# Patient Record
Sex: FEMALE | Race: WHITE | Employment: OTHER | URBAN - METROPOLITAN AREA
[De-identification: names, ages, dates, MRNs, and addresses within clinical notes are randomized per-mention and may not be internally consistent; named-entity substitution may affect disease eponyms.]

---

## 2017-11-30 PROBLEM — L98.422 SKIN ULCER OF BACK WITH FAT LAYER EXPOSED (HCC): Status: ACTIVE | Noted: 2017-11-30

## 2018-03-16 ENCOUNTER — HOSPITAL ENCOUNTER (OUTPATIENT)
Dept: WOUND CARE | Age: 38
Discharge: HOME | End: 2018-03-16
Payer: MEDICARE

## 2018-03-16 VITALS
DIASTOLIC BLOOD PRESSURE: 64 MMHG | RESPIRATION RATE: 16 BRPM | HEIGHT: 55 IN | SYSTOLIC BLOOD PRESSURE: 104 MMHG | TEMPERATURE: 98.3 F | BODY MASS INDEX: 42.35 KG/M2 | WEIGHT: 183 LBS | HEART RATE: 70 BPM

## 2018-03-16 PROCEDURE — 11042 DBRDMT SUBQ TIS 1ST 20SQCM/<: CPT

## 2018-03-16 PROCEDURE — 11045 DBRDMT SUBQ TISS EACH ADDL: CPT | Performed by: FAMILY MEDICINE

## 2018-03-16 PROCEDURE — 11042 DBRDMT SUBQ TIS 1ST 20SQCM/<: CPT | Performed by: FAMILY MEDICINE

## 2018-03-16 NOTE — PROGRESS NOTES
Follow-Up Wound Progress Note  Maryam Casper  AGE: 42 y.o. GENDER: female  DOD: 1980  TODAY'S DATE:  3/16/18  Subjective:    Kirti Dominguez is a 42 y.o. female who presents today for wound check. Wound Etiology :  pressure ulcer: Stage III  Wound Location :   on the left heel and the dorsal spine Pain : {0/10     Abx : Absent       Overall Wound Assessment  Wound is has slightly improved, heel and back is down to periosteum  Size has decreased    Objective:    /64   Pulse 70   Temp 98.3 °F (36.8 °C) (Oral)   Resp 16   Ht 4' 3\" (1.295 m)   Wt 183 lb (83 kg)   BMI 49.47 kg/m²   Pressure Ulcer 10/20/14 Coccyx #12 stage II pressure ulcer coccyx aquired 1-18-14 (Active)   Number of days: 1243       Pressure Ulcer 11/02/17 Foot Left #14 left HEEL stage II pressure ulcer acq: 10-14-17 (Active)   Kiera-wound Assessment Calloused;Pink; Intact 3/16/2018  9:07 AM   Kiera-Wound Moisture Dry,Scaly; Intact 3/1/2018  9:24 AM   Pressure Ulcer Staging Stage II 11/2/2017  8:43 AM   Wound Assessment Red;Pink 3/16/2018  9:07 AM   Wound Length (cm) 1 cm 3/16/2018  9:33 AM   Wound Width (cm) 0.6 cm 3/16/2018  9:33 AM   Wound Depth (cm)  .4 3/16/2018  9:33 AM   Calculated Wound Size (cm^2) (l*w) 0.6 cm^2 3/16/2018  9:33 AM   Change in Wound Size % (l*w) 95.37 3/16/2018  9:33 AM   Culture Taken Yes 11/2/2017  9:11 AM   Dressing Status Intact 3/16/2018 10:27 AM   Dressing Changed Changed/New 3/16/2018 10:27 AM   Dressing/Treatment Dry dressing 3/16/2018 10:27 AM   Wound Cleansed Rinsed/Irrigated with saline 3/9/2018  9:39 AM   Dressing Change Due 03/09/18 3/9/2018  9:39 AM   Granulation Quality Red 2/1/2018  8:41 AM   Drainage Amount Scant 3/16/2018  9:07 AM   Drainage Description Serosanguinous 3/16/2018  9:07 AM   Odor None 3/16/2018  9:07 AM   Debridement per physician Subcutaneous 3/16/2018  9:33 AM   Time out Yes 3/16/2018  9:33 AM   Procedural Pain 0 3/16/2018  9:33 AM   Post procedural Pain 0 2/22/2018  9:34 AM   Number of days: 134       Pressure Ulcer 11/02/17 Coccyx Mid #15 blocked stage II coccyx ulcer acq: 10-22-17 (Active)   Kiera-wound Assessment Excoriated;Fragile 3/16/2018  9:07 AM   Kiera-Wound Moisture Dry,Scaly 3/1/2018  9:24 AM   Pressure Ulcer Staging Stage II 11/2/2017  8:43 AM   Wound Assessment Yellow;Pink;Red 3/16/2018  9:07 AM   Wound Length (cm) 5 cm 3/16/2018  9:33 AM   Wound Width (cm) 7.4 cm 3/16/2018  9:33 AM   Wound Depth (cm)  1.3 3/16/2018  9:33 AM   Calculated Wound Size (cm^2) (l*w) 37 cm^2 3/16/2018  9:33 AM   Change in Wound Size % (l*w) 39.24 3/16/2018  9:33 AM   Culture Taken Yes 11/9/2017 10:02 AM   Dressing Status Intact 3/16/2018 10:27 AM   Dressing Changed Changed/New 3/16/2018 10:27 AM   Dressing/Treatment Alginate;Dry dressing 3/16/2018 10:27 AM   Wound Cleansed Rinsed/Irrigated with saline 3/16/2018 10:27 AM   Dressing Change Due 03/09/18 3/9/2018  9:39 AM   Drainage Amount Moderate 3/16/2018  9:07 AM   Drainage Description Serosanguinous 3/16/2018  9:07 AM   Odor None 3/16/2018  9:07 AM   Undermining Starts ___ O'Clock 1 3/16/2018  9:07 AM   Undermining Ends___ O'Clock 4 3/16/2018  9:07 AM   Undermining Maxium Distance (cm) 1.5 3/16/2018  9:07 AM   [place]%Wound Bed 20 1/11/2018  8:26 AM   Red%Wound Bed 40 1/11/2018  8:26 AM   Yellow%Wound Bed 40 1/11/2018  8:26 AM   Debridement per physician Subcutaneous 3/16/2018  9:33 AM   Time out Yes 3/16/2018  9:33 AM   Procedural Pain 0 3/16/2018  9:33 AM   Post procedural Pain 0 2/22/2018  9:34 AM   Number of days: 134       Wound 03/16/18 Toe (Comment  which one) Left #16 new aquired 3/1/18 first toe (Active)   Wound Pressure Stage  2 3/16/2018  9:07 AM   Dressing Changed Changed/New 3/16/2018 10:27 AM   Dressing/Treatment Dry dressing 3/16/2018 10:27 AM   Wound Cleansed Rinsed/Irrigated with saline 3/16/2018 10:27 AM   Wound Length (cm) 1 cm 3/16/2018  9:07 AM   Wound Width (cm) 0.7 cm 3/16/2018  9:07 AM   Wound Depth (cm)  .1 3/16/2018 9: 07 AM   Calculated Wound Size (cm^2) (l*w) 0.7 cm^2 3/16/2018  9:07 AM   Wound Assessment Red;Bleeding 3/16/2018  9:07 AM   Drainage Amount Small 3/16/2018  9:07 AM   Drainage Description Sanguinous 3/16/2018  9:07 AM   Odor None 3/16/2018  9:07 AM   Kiera-wound Assessment Pink 3/16/2018  9:07 AM   Debridement per physician None 3/16/2018  9:33 AM   Number of days: 0       Incision 10/06/14 Back Mid (Active)   Number of days: 1257     Wound   serous exudate noted  Errythema - Present    (this wound was originally measured on:)  Wound 03/16/18 Toe (Comment  which one) Left #16 new aquired 3/1/18 first toe-Wound Length (cm): 1 cm  Wound 03/16/18 Toe (Comment  which one) Left #16 new aquired 3/1/18 first toe-Wound Width (cm): 0.7 cm  Wound 03/16/18 Toe (Comment  which one) Left #16 new aquired 3/1/18 first toe-Wound Depth (cm) : .1   Measurements shown are from today's visit. Wound 03/16/18 Toe (Comment  which one) Left #16 new aquired 3/1/18 first toe-Wound Assessment: Red, Bleeding     Wound 03/16/18 Toe (Comment  which one) Left #16 new aquired 3/1/18 first toe-Wound Assessment: Red, Bleeding  Wound 03/16/18 Toe (Comment  which one) Left #16 new aquired 3/1/18 first toe-Kiera-wound Assessment: Pink  Wound 03/16/18 Toe (Comment  which one) Left #16 new aquired 3/1/18 first toe-Drainage Amount: Small  Wound 03/16/18 Toe (Comment  which one) Left #16 new aquired 3/1/18 first toe-Drainage Description: Sanguinous  Wound 03/16/18 Toe (Comment  which one) Left #16 new aquired 3/1/18 first toe-Odor: None     Assessment:     Please refer to nursing measurements and assessment regarding wound size pre and post debridement. Wound check - Care provided includes removal of existing dressing and visual inspection    Procedure: Debridement Note  The patient was placed in the right lateral position. Lidocaine  gauze was applied  at beginning of wound evaluation. An  Excisional Debridement was performed. Using a curette ,  the wound was debrided sharply of all fibrotic, necrotic, and hyperkeratotic tissue, including a layer of surrounding healthy tissue to stimulate epithelization. The wound was excised through the level of the subcutaneous Wound Percentage debrided is 100%. Please refer to Nurses notes for pre and post debridement dimensions. Wound was irrigated with normal saline solution. Bleeding was with a small amount of bleeding, and controlled with pressure. Patient tolerated procedure well and was given proper instruction. Diagnosis: pressure ulcer: Stage III                      Patient Active Problem List   Diagnosis Code    Decubitus skin ulcer. 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 Initiate VAC Tx on the back wound. 3. Discussed appropriate home care of this wound. 4. Patient instructions were given. 5. Follow Up in 1 week(s).                                      Shanique Chapman DO                           3/16/18     11:31 AM

## 2018-03-23 ENCOUNTER — HOSPITAL ENCOUNTER (OUTPATIENT)
Dept: WOUND CARE | Age: 38
Discharge: HOME | End: 2018-03-23
Payer: MEDICARE

## 2018-03-23 VITALS
WEIGHT: 183 LBS | HEART RATE: 72 BPM | HEIGHT: 55 IN | DIASTOLIC BLOOD PRESSURE: 54 MMHG | BODY MASS INDEX: 42.35 KG/M2 | TEMPERATURE: 98.7 F | SYSTOLIC BLOOD PRESSURE: 104 MMHG | RESPIRATION RATE: 18 BRPM

## 2018-03-23 DIAGNOSIS — L89.154 DECUBITUS ULCER OF SACRAL REGION, STAGE 4 (HCC): Primary | ICD-10-CM

## 2018-03-23 PROCEDURE — 11045 DBRDMT SUBQ TISS EACH ADDL: CPT | Performed by: FAMILY MEDICINE

## 2018-03-23 PROCEDURE — 97607 NEG PRS WND THR NDME<=50SQCM: CPT

## 2018-03-23 PROCEDURE — 11042 DBRDMT SUBQ TIS 1ST 20SQCM/<: CPT

## 2018-03-23 PROCEDURE — 11045 DBRDMT SUBQ TISS EACH ADDL: CPT

## 2018-03-23 PROCEDURE — 11042 DBRDMT SUBQ TIS 1ST 20SQCM/<: CPT | Performed by: FAMILY MEDICINE

## 2018-04-06 ENCOUNTER — HOSPITAL ENCOUNTER (OUTPATIENT)
Dept: WOUND CARE | Age: 38
Discharge: HOME | End: 2018-04-06
Payer: MEDICARE

## 2018-04-06 ENCOUNTER — HOSPITAL ENCOUNTER (OUTPATIENT)
Age: 38
Discharge: HOME | End: 2018-04-06
Payer: MEDICARE

## 2018-04-06 VITALS
HEART RATE: 99 BPM | SYSTOLIC BLOOD PRESSURE: 104 MMHG | TEMPERATURE: 98.6 F | RESPIRATION RATE: 16 BRPM | DIASTOLIC BLOOD PRESSURE: 62 MMHG

## 2018-04-06 LAB
ANION GAP SERPL CALCULATED.3IONS-SCNC: 12 MMOL/L (ref 7–16)
BUN BLDV-MCNC: 8 MG/DL (ref 6–20)
CALCIUM SERPL-MCNC: 8.9 MG/DL (ref 8.6–10.2)
CHLORIDE BLD-SCNC: 100 MMOL/L (ref 98–107)
CHOLESTEROL, FASTING: 169 MG/DL (ref 0–199)
CO2: 25 MMOL/L (ref 22–29)
CREAT SERPL-MCNC: 0.4 MG/DL (ref 0.5–1)
CREATININE URINE: 39 MG/DL (ref 29–226)
GFR AFRICAN AMERICAN: >60
GFR NON-AFRICAN AMERICAN: >60 ML/MIN/1.73
GLUCOSE FASTING: 97 MG/DL (ref 74–109)
HDLC SERPL-MCNC: 71 MG/DL
LDL CHOLESTEROL CALCULATED: 86 MG/DL (ref 0–99)
MAGNESIUM: 2.1 MG/DL (ref 1.6–2.6)
MICROALBUMIN UR-MCNC: 15.4 MG/L
MICROALBUMIN/CREAT UR-RTO: 39.5 (ref 0–30)
POTASSIUM SERPL-SCNC: 4.3 MMOL/L (ref 3.5–5)
SODIUM BLD-SCNC: 137 MMOL/L (ref 132–146)
TRIGLYCERIDE, FASTING: 60 MG/DL (ref 0–149)
VITAMIN D 25-HYDROXY: 26 NG/ML (ref 30–100)
VLDLC SERPL CALC-MCNC: 12 MG/DL

## 2018-04-06 PROCEDURE — 83735 ASSAY OF MAGNESIUM: CPT

## 2018-04-06 PROCEDURE — 82570 ASSAY OF URINE CREATININE: CPT

## 2018-04-06 PROCEDURE — 11042 DBRDMT SUBQ TIS 1ST 20SQCM/<: CPT

## 2018-04-06 PROCEDURE — 80061 LIPID PANEL: CPT

## 2018-04-06 PROCEDURE — 36415 COLL VENOUS BLD VENIPUNCTURE: CPT

## 2018-04-06 PROCEDURE — 82306 VITAMIN D 25 HYDROXY: CPT

## 2018-04-06 PROCEDURE — 11042 DBRDMT SUBQ TIS 1ST 20SQCM/<: CPT | Performed by: FAMILY MEDICINE

## 2018-04-06 PROCEDURE — 11045 DBRDMT SUBQ TISS EACH ADDL: CPT | Performed by: FAMILY MEDICINE

## 2018-04-06 PROCEDURE — 11045 DBRDMT SUBQ TISS EACH ADDL: CPT

## 2018-04-06 PROCEDURE — 80048 BASIC METABOLIC PNL TOTAL CA: CPT

## 2018-04-06 PROCEDURE — 82044 UR ALBUMIN SEMIQUANTITATIVE: CPT

## 2018-04-06 PROCEDURE — 97607 NEG PRS WND THR NDME<=50SQCM: CPT

## 2018-04-13 ENCOUNTER — HOSPITAL ENCOUNTER (OUTPATIENT)
Dept: WOUND CARE | Age: 38
Discharge: HOME | End: 2018-04-13
Payer: MEDICARE

## 2018-04-13 VITALS
HEART RATE: 80 BPM | WEIGHT: 183 LBS | RESPIRATION RATE: 18 BRPM | DIASTOLIC BLOOD PRESSURE: 60 MMHG | BODY MASS INDEX: 42.35 KG/M2 | HEIGHT: 55 IN | TEMPERATURE: 99.3 F | SYSTOLIC BLOOD PRESSURE: 118 MMHG

## 2018-04-13 DIAGNOSIS — L97.422 SKIN ULCER OF LEFT HEEL WITH FAT LAYER EXPOSED (HCC): Primary | ICD-10-CM

## 2018-04-13 DIAGNOSIS — L98.422 SKIN ULCER OF BACK WITH FAT LAYER EXPOSED (HCC): ICD-10-CM

## 2018-04-13 PROCEDURE — 11042 DBRDMT SUBQ TIS 1ST 20SQCM/<: CPT

## 2018-04-13 PROCEDURE — 11042 DBRDMT SUBQ TIS 1ST 20SQCM/<: CPT | Performed by: FAMILY MEDICINE

## 2018-04-13 PROCEDURE — 97607 NEG PRS WND THR NDME<=50SQCM: CPT

## 2018-04-13 PROCEDURE — 11045 DBRDMT SUBQ TISS EACH ADDL: CPT | Performed by: FAMILY MEDICINE

## 2018-04-20 ENCOUNTER — HOSPITAL ENCOUNTER (OUTPATIENT)
Dept: WOUND CARE | Age: 38
Discharge: HOME | End: 2018-04-20
Payer: MEDICARE

## 2018-04-20 VITALS
WEIGHT: 183 LBS | TEMPERATURE: 98.6 F | HEIGHT: 55 IN | DIASTOLIC BLOOD PRESSURE: 54 MMHG | HEART RATE: 88 BPM | RESPIRATION RATE: 18 BRPM | SYSTOLIC BLOOD PRESSURE: 102 MMHG | BODY MASS INDEX: 42.35 KG/M2

## 2018-04-20 DIAGNOSIS — L98.422 SKIN ULCER OF BACK WITH FAT LAYER EXPOSED (HCC): ICD-10-CM

## 2018-04-20 DIAGNOSIS — L97.422 SKIN ULCER OF LEFT HEEL WITH FAT LAYER EXPOSED (HCC): ICD-10-CM

## 2018-04-20 PROCEDURE — 11042 DBRDMT SUBQ TIS 1ST 20SQCM/<: CPT | Performed by: FAMILY MEDICINE

## 2018-04-20 PROCEDURE — 11042 DBRDMT SUBQ TIS 1ST 20SQCM/<: CPT

## 2018-04-20 PROCEDURE — 97607 NEG PRS WND THR NDME<=50SQCM: CPT

## 2018-05-04 ENCOUNTER — HOSPITAL ENCOUNTER (OUTPATIENT)
Dept: WOUND CARE | Age: 38
Discharge: HOME | End: 2018-05-04

## 2018-05-18 ENCOUNTER — HOSPITAL ENCOUNTER (OUTPATIENT)
Dept: WOUND CARE | Age: 38
Discharge: HOME | End: 2018-05-18
Payer: MEDICARE

## 2018-05-18 VITALS
RESPIRATION RATE: 18 BRPM | TEMPERATURE: 98.6 F | DIASTOLIC BLOOD PRESSURE: 60 MMHG | SYSTOLIC BLOOD PRESSURE: 110 MMHG | HEART RATE: 84 BPM

## 2018-05-18 DIAGNOSIS — L98.422 SKIN ULCER OF BACK WITH FAT LAYER EXPOSED (HCC): ICD-10-CM

## 2018-05-18 DIAGNOSIS — L97.422 SKIN ULCER OF LEFT HEEL WITH FAT LAYER EXPOSED (HCC): ICD-10-CM

## 2018-05-18 PROCEDURE — 11042 DBRDMT SUBQ TIS 1ST 20SQCM/<: CPT | Performed by: FAMILY MEDICINE

## 2018-05-18 PROCEDURE — 87186 SC STD MICRODIL/AGAR DIL: CPT

## 2018-05-18 PROCEDURE — 11042 DBRDMT SUBQ TIS 1ST 20SQCM/<: CPT

## 2018-05-18 PROCEDURE — 87075 CULTR BACTERIA EXCEPT BLOOD: CPT

## 2018-05-18 PROCEDURE — 11045 DBRDMT SUBQ TISS EACH ADDL: CPT | Performed by: FAMILY MEDICINE

## 2018-05-18 PROCEDURE — 87070 CULTURE OTHR SPECIMN AEROBIC: CPT

## 2018-05-18 PROCEDURE — 11045 DBRDMT SUBQ TISS EACH ADDL: CPT

## 2018-05-18 PROCEDURE — 87077 CULTURE AEROBIC IDENTIFY: CPT

## 2018-05-20 LAB — ANAEROBIC CULTURE: NORMAL

## 2018-05-21 LAB
ORGANISM: ABNORMAL
WOUND/ABSCESS: ABNORMAL

## 2018-05-25 ENCOUNTER — HOSPITAL ENCOUNTER (OUTPATIENT)
Dept: WOUND CARE | Age: 38
Discharge: HOME | End: 2018-05-25
Payer: MEDICARE

## 2018-05-25 VITALS
HEART RATE: 78 BPM | BODY MASS INDEX: 42.35 KG/M2 | RESPIRATION RATE: 20 BRPM | TEMPERATURE: 99.1 F | DIASTOLIC BLOOD PRESSURE: 58 MMHG | SYSTOLIC BLOOD PRESSURE: 118 MMHG | HEIGHT: 55 IN | WEIGHT: 183 LBS

## 2018-05-25 PROCEDURE — 11042 DBRDMT SUBQ TIS 1ST 20SQCM/<: CPT

## 2018-05-25 PROCEDURE — 11042 DBRDMT SUBQ TIS 1ST 20SQCM/<: CPT | Performed by: FAMILY MEDICINE

## 2018-05-25 PROCEDURE — 97607 NEG PRS WND THR NDME<=50SQCM: CPT

## 2018-06-01 ENCOUNTER — HOSPITAL ENCOUNTER (OUTPATIENT)
Dept: WOUND CARE | Age: 38
Discharge: HOME | End: 2018-06-01
Payer: MEDICARE

## 2018-06-01 VITALS
BODY MASS INDEX: 42.35 KG/M2 | TEMPERATURE: 98.9 F | WEIGHT: 183 LBS | HEIGHT: 55 IN | RESPIRATION RATE: 18 BRPM | DIASTOLIC BLOOD PRESSURE: 60 MMHG | SYSTOLIC BLOOD PRESSURE: 100 MMHG | HEART RATE: 80 BPM

## 2018-06-01 PROCEDURE — 11042 DBRDMT SUBQ TIS 1ST 20SQCM/<: CPT

## 2018-06-01 PROCEDURE — 11042 DBRDMT SUBQ TIS 1ST 20SQCM/<: CPT | Performed by: FAMILY MEDICINE

## 2018-06-01 PROCEDURE — 97607 NEG PRS WND THR NDME<=50SQCM: CPT

## 2018-06-08 ENCOUNTER — HOSPITAL ENCOUNTER (OUTPATIENT)
Dept: WOUND CARE | Age: 38
Discharge: HOME | End: 2018-06-08
Payer: MEDICARE

## 2018-06-08 VITALS
RESPIRATION RATE: 16 BRPM | HEART RATE: 73 BPM | TEMPERATURE: 98.3 F | SYSTOLIC BLOOD PRESSURE: 90 MMHG | DIASTOLIC BLOOD PRESSURE: 40 MMHG

## 2018-06-08 DIAGNOSIS — L98.422 SKIN ULCER OF BACK WITH FAT LAYER EXPOSED (HCC): ICD-10-CM

## 2018-06-08 DIAGNOSIS — L97.422 SKIN ULCER OF LEFT HEEL WITH FAT LAYER EXPOSED (HCC): ICD-10-CM

## 2018-06-08 PROCEDURE — 11042 DBRDMT SUBQ TIS 1ST 20SQCM/<: CPT

## 2018-06-08 PROCEDURE — 11042 DBRDMT SUBQ TIS 1ST 20SQCM/<: CPT | Performed by: FAMILY MEDICINE

## 2018-06-08 PROCEDURE — 97607 NEG PRS WND THR NDME<=50SQCM: CPT

## 2018-06-15 ENCOUNTER — HOSPITAL ENCOUNTER (OUTPATIENT)
Dept: WOUND CARE | Age: 38
Discharge: HOME | End: 2018-06-15
Payer: MEDICARE

## 2018-06-15 VITALS
BODY MASS INDEX: 42.35 KG/M2 | HEIGHT: 55 IN | DIASTOLIC BLOOD PRESSURE: 60 MMHG | HEART RATE: 76 BPM | RESPIRATION RATE: 16 BRPM | TEMPERATURE: 98.1 F | WEIGHT: 183 LBS | SYSTOLIC BLOOD PRESSURE: 110 MMHG

## 2018-06-15 PROCEDURE — 97607 NEG PRS WND THR NDME<=50SQCM: CPT

## 2018-06-15 PROCEDURE — 11042 DBRDMT SUBQ TIS 1ST 20SQCM/<: CPT | Performed by: FAMILY MEDICINE

## 2018-06-15 PROCEDURE — 11042 DBRDMT SUBQ TIS 1ST 20SQCM/<: CPT

## 2018-06-22 ENCOUNTER — HOSPITAL ENCOUNTER (OUTPATIENT)
Dept: WOUND CARE | Age: 38
Discharge: HOME | End: 2018-06-22
Payer: MEDICARE

## 2018-06-22 VITALS
SYSTOLIC BLOOD PRESSURE: 112 MMHG | DIASTOLIC BLOOD PRESSURE: 62 MMHG | HEART RATE: 70 BPM | TEMPERATURE: 98 F | RESPIRATION RATE: 14 BRPM

## 2018-06-22 DIAGNOSIS — L98.422 SKIN ULCER OF BACK WITH FAT LAYER EXPOSED (HCC): ICD-10-CM

## 2018-06-22 DIAGNOSIS — L97.422 SKIN ULCER OF LEFT HEEL WITH FAT LAYER EXPOSED (HCC): ICD-10-CM

## 2018-06-22 PROCEDURE — 11042 DBRDMT SUBQ TIS 1ST 20SQCM/<: CPT | Performed by: FAMILY MEDICINE

## 2018-06-22 PROCEDURE — 11042 DBRDMT SUBQ TIS 1ST 20SQCM/<: CPT

## 2018-06-29 ENCOUNTER — HOSPITAL ENCOUNTER (OUTPATIENT)
Dept: WOUND CARE | Age: 38
Discharge: HOME | End: 2018-06-29
Payer: MEDICARE

## 2018-06-29 VITALS
WEIGHT: 183 LBS | HEIGHT: 55 IN | TEMPERATURE: 98.5 F | RESPIRATION RATE: 18 BRPM | HEART RATE: 68 BPM | DIASTOLIC BLOOD PRESSURE: 66 MMHG | BODY MASS INDEX: 42.35 KG/M2 | SYSTOLIC BLOOD PRESSURE: 108 MMHG

## 2018-06-29 DIAGNOSIS — L98.422 SKIN ULCER OF BACK WITH FAT LAYER EXPOSED (HCC): ICD-10-CM

## 2018-06-29 PROCEDURE — 11042 DBRDMT SUBQ TIS 1ST 20SQCM/<: CPT

## 2018-06-29 PROCEDURE — 11042 DBRDMT SUBQ TIS 1ST 20SQCM/<: CPT | Performed by: FAMILY MEDICINE

## 2018-07-06 ENCOUNTER — HOSPITAL ENCOUNTER (OUTPATIENT)
Dept: WOUND CARE | Age: 38
Discharge: HOME | End: 2018-07-06
Payer: MEDICARE

## 2018-07-06 VITALS
TEMPERATURE: 99 F | DIASTOLIC BLOOD PRESSURE: 50 MMHG | HEART RATE: 76 BPM | RESPIRATION RATE: 18 BRPM | WEIGHT: 183 LBS | BODY MASS INDEX: 42.35 KG/M2 | SYSTOLIC BLOOD PRESSURE: 98 MMHG | HEIGHT: 55 IN

## 2018-07-06 DIAGNOSIS — L98.422 SKIN ULCER OF BACK WITH FAT LAYER EXPOSED (HCC): ICD-10-CM

## 2018-07-06 PROCEDURE — 11042 DBRDMT SUBQ TIS 1ST 20SQCM/<: CPT | Performed by: FAMILY MEDICINE

## 2018-07-06 PROCEDURE — 97607 NEG PRS WND THR NDME<=50SQCM: CPT

## 2018-07-06 PROCEDURE — 11042 DBRDMT SUBQ TIS 1ST 20SQCM/<: CPT

## 2018-07-13 ENCOUNTER — HOSPITAL ENCOUNTER (OUTPATIENT)
Dept: WOUND CARE | Age: 38
Discharge: HOME | End: 2018-07-13
Payer: MEDICARE

## 2018-07-13 VITALS
TEMPERATURE: 98.7 F | HEIGHT: 55 IN | DIASTOLIC BLOOD PRESSURE: 72 MMHG | SYSTOLIC BLOOD PRESSURE: 104 MMHG | WEIGHT: 183 LBS | RESPIRATION RATE: 18 BRPM | HEART RATE: 76 BPM | BODY MASS INDEX: 42.35 KG/M2

## 2018-07-13 PROCEDURE — 11042 DBRDMT SUBQ TIS 1ST 20SQCM/<: CPT | Performed by: FAMILY MEDICINE

## 2018-07-13 PROCEDURE — 11042 DBRDMT SUBQ TIS 1ST 20SQCM/<: CPT

## 2018-07-13 NOTE — PLAN OF CARE
Problem: Pressure Ulcer:  Goal: Signs of wound healing will improve  Signs of wound healing will improve   Outcome: Ongoing

## 2018-07-13 NOTE — PROGRESS NOTES
Undermining Starts ___ O'Clock 3 7/13/2018  8:28 AM   Undermining Ends___ O'Clock 9 7/13/2018  8:28 AM   Undermining Maxium Distance (cm) 1.3 7/13/2018  8:28 AM   [place]%Wound Bed 20 1/11/2018  8:26 AM   Red%Wound Bed 40 1/11/2018  8:26 AM   Yellow%Wound Bed 40 1/11/2018  8:26 AM   Debridement per physician Subcutaneous 3/16/2018  9:33 AM   Time out Yes 7/13/2018  9:45 AM   Procedural Pain 0 7/13/2018  9:45 AM   Post procedural Pain 0 6/15/2018  9:53 AM   Number of days: 253       Incision 10/06/14 Back Mid (Active)   Number of days: 1376     Wound   serous exudate noted  Errythema - Present    (this wound was originally measured on:)            Measurements shown are from today's visit. Assessment:     Please refer to nursing measurements and assessment regarding wound size pre and post debridement. Wound check - Care provided includes removal of existing dressing and visual inspection    Procedure: Debridement Note: Wound # 15. At 8.25 cm sq. The patient was placed in the right lateral position. Lidocaine  gauze was applied  at beginning of wound evaluation. An  Excisional Debridement was performed. Using a curette and tissue nippers ,  the wound was debrided sharply of all fibrotic, necrotic, and hyperkeratotic tissue, including a layer of surrounding healthy tissue to stimulate epithelization. The wound was excised through the level of the subcutaneous Wound Percentage debrided is 100%. Please refer to Nurses notes for pre and post debridement dimensions. Wound was irrigated with normal saline solution. Bleeding was with a moderate amount of bleeding, and controlled with pressure. Patient tolerated procedure well and was given proper instruction. Diagnosis: pressure ulcer: Stage IV                      Patient Active Problem List   Diagnosis Code    Decubitus skin ulcer.  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 DC VAC and apply Alginate daily                              3. Discussed appropriate home care of this wound. 4. Patient instructions were given. 5. Follow Up in 1 week(s).                                      Fish Arriaga DO                           7/13/18     12:13 PM

## 2018-07-20 ENCOUNTER — HOSPITAL ENCOUNTER (OUTPATIENT)
Dept: WOUND CARE | Age: 38
Discharge: HOME | End: 2018-07-20
Payer: MEDICARE

## 2018-07-20 VITALS
RESPIRATION RATE: 14 BRPM | SYSTOLIC BLOOD PRESSURE: 100 MMHG | TEMPERATURE: 99.7 F | DIASTOLIC BLOOD PRESSURE: 70 MMHG | HEART RATE: 70 BPM

## 2018-07-20 PROCEDURE — 11042 DBRDMT SUBQ TIS 1ST 20SQCM/<: CPT

## 2018-07-20 PROCEDURE — 11042 DBRDMT SUBQ TIS 1ST 20SQCM/<: CPT | Performed by: FAMILY MEDICINE

## 2018-07-20 NOTE — PROGRESS NOTES
Bactrim [Sulfamethoxazole-Trimethoprim] Nausea And Vomiting     Current Outpatient Prescriptions on File Prior to Encounter   Medication Sig Dispense Refill    collagenase (SANTYL) 250 UNIT/GM ointment Apply topically daily. 90 g 1    naproxen (EC NAPROSYN) 500 MG EC tablet Take 500 mg by mouth as needed for Pain      enalapril (VASOTEC) 5 MG tablet Take 2.5 mg by mouth daily       albuterol sulfate HFA (PROAIR HFA) 108 (90 Base) MCG/ACT inhaler Inhale 2 puffs into the lungs every 6 hours as needed for Wheezing or Shortness of Breath      simvastatin (ZOCOR) 10 MG tablet Take 10 mg by mouth nightly      Cholecalciferol (VITAMIN D3) 2000 units CAPS Take 1 capsule by mouth nightly       venlafaxine (EFFEXOR) 75 MG tablet Take 75 mg by mouth nightly      ibuprofen (ADVIL) 200 MG CAPS Take 2 capsules by mouth daily as needed for Pain       acetaminophen (TYLENOL) 325 MG tablet Take 650 mg by mouth 3 times daily as needed for Pain       citalopram (CELEXA) 40 MG tablet Take 40 mg by mouth nightly        No current facility-administered medications on file prior to encounter. REVIEW OF SYSTEMS See HPI    Objective:    /70   Pulse 70   Temp 99.7 °F (37.6 °C) (Oral)   Resp 14   Wt Readings from Last 3 Encounters:   07/13/18 183 lb (83 kg)   07/06/18 183 lb (83 kg)   06/29/18 183 lb (83 kg)     PHYSICAL EXAM  CONSTITUTIONAL:   Awake, alert, cooperative   EYES:  lids and lashes normal   ENT: external ears and nose without lesions   NECK:  supple, symmetrical, trachea midline   SKIN:  Open wound Present    Assessment:     Problem List Items Addressed This Visit     None        Procedure Note  Indications:  Based on my examination of this patient's wound(s)/ulcer(s) today, debridement is required to promote healing and evaluate the wound base.     Performed by: Fady Balderrama DO    Consent obtained:  Yes    Time out taken:  Yes    Pain Control: Anesthetic  Anesthetic: None Debridement:Excisional Debridement    Using curette, scissors and forceps the wound(s)/ulcer(s) was/were sharply debrided down through and including the removal of subcutaneous tissue. Devitalized Tissue Debrided:  fibrin, slough and necrotic/eschar to stimulate bleeding to promote healing, post debridement good bleeding base and wound edges noted    Pre Debridement Measurements:  Are located in the [place]  Documentation Flow Sheet    Wound/Ulcer #: 12    Post Debridement Measurements:  Wound/Ulcer Descriptions are Pre Debridement except measurements:    Pressure Ulcer 10/20/14 Coccyx #12 stage II pressure ulcer coccyx aquired 1-18-14 (Active)   Number of days: 1369       Pressure Ulcer 11/02/17 Coccyx Mid #15 blocked stage II coccyx ulcer acq: 10-22-17 (Active)   Kiera-wound Assessment Pink; Intact 7/6/2018  8:27 AM   Kiera-Wound Moisture Macerated 6/29/2018  8:40 AM   Pressure Ulcer Staging Stage II 11/2/2017  8:43 AM   Wound Assessment Pink;Red 7/6/2018  8:27 AM   Wound Length (cm) 5.5 cm 7/20/2018 10:07 AM   Wound Width (cm) 1.8 cm 7/20/2018 10:07 AM   Wound Depth (cm)  2.0 7/20/2018 10:07 AM   Calculated Wound Size (cm^2) (l*w) 9.9 cm^2 7/20/2018 10:07 AM   Change in Wound Size % (l*w) 83.74 7/20/2018 10:07 AM   Culture Taken Yes 5/18/2018  9:05 AM   Dressing Status Changed 6/29/2018  9:49 AM   Dressing Changed Changed/New 6/29/2018  9:49 AM   Dressing/Treatment Alginate 6/29/2018  9:49 AM   Wound Cleansed Rinsed/Irrigated with saline 6/29/2018  9:49 AM   Dressing Change Due 06/23/18 6/22/2018  9:41 AM   Drainage Amount Moderate 7/20/2018  9:25 AM   Drainage Description Serosanguinous 7/20/2018  9:25 AM   Odor None 7/20/2018  9:25 AM   Distance Tunneling (cm) 1.6 cm 6/1/2018  8:57 AM   Tunneling Position ___ O'Clock 0.66 6/1/2018  8:57 AM   Undermining Starts ___ O'Clock 3 7/20/2018  9:25 AM   Undermining Ends___ O'Clock 6 7/20/2018  9:25 AM   Undermining Maxium Distance (cm) 1.5 7/20/2018  9:25 AM

## 2018-08-03 ENCOUNTER — HOSPITAL ENCOUNTER (OUTPATIENT)
Dept: WOUND CARE | Age: 38
Discharge: HOME | End: 2018-08-03
Payer: MEDICARE

## 2018-08-03 VITALS
DIASTOLIC BLOOD PRESSURE: 60 MMHG | RESPIRATION RATE: 16 BRPM | HEART RATE: 78 BPM | SYSTOLIC BLOOD PRESSURE: 114 MMHG | TEMPERATURE: 98.3 F

## 2018-08-03 DIAGNOSIS — L89.154 DECUBITUS ULCER OF SACRAL REGION, STAGE 4 (HCC): ICD-10-CM

## 2018-08-03 DIAGNOSIS — M46.28 ACUTE OSTEOMYELITIS OF SACRUM (HCC): Primary | ICD-10-CM

## 2018-08-03 DIAGNOSIS — L98.422 SKIN ULCER OF BACK WITH FAT LAYER EXPOSED (HCC): ICD-10-CM

## 2018-08-03 PROCEDURE — 87075 CULTR BACTERIA EXCEPT BLOOD: CPT

## 2018-08-03 PROCEDURE — 11042 DBRDMT SUBQ TIS 1ST 20SQCM/<: CPT

## 2018-08-03 PROCEDURE — 87070 CULTURE OTHR SPECIMN AEROBIC: CPT

## 2018-08-03 PROCEDURE — 11042 DBRDMT SUBQ TIS 1ST 20SQCM/<: CPT | Performed by: FAMILY MEDICINE

## 2018-08-03 PROCEDURE — 87186 SC STD MICRODIL/AGAR DIL: CPT

## 2018-08-03 RX ORDER — SULFAMETHOXAZOLE AND TRIMETHOPRIM 800; 160 MG/1; MG/1
1 TABLET ORAL 2 TIMES DAILY
Qty: 14 TABLET | Refills: 0 | Status: SHIPPED | OUTPATIENT
Start: 2018-08-03 | End: 2018-08-10 | Stop reason: ALTCHOICE

## 2018-08-03 NOTE — PLAN OF CARE
Problem: Pressure Ulcer:  Goal: Will show no infection signs and symptoms  Will show no infection signs and symptoms   Outcome: Ongoing

## 2018-08-03 NOTE — PLAN OF CARE
Problem: Pressure Ulcer:  Goal: Absence of new pressure ulcer  Absence of new pressure ulcer   Outcome: Ongoing

## 2018-08-03 NOTE — PROGRESS NOTES
Follow-Up Wound Progress Note  Maryam Casper  AGE: 34 y.o. GENDER: female  DOD: 1980  TODAY'S DATE:  8/3/18  Subjective:    Marilu Sullivan is a 34 y.o. female who presents today for wound check. Wound Etiology :  pressure ulcer: Stage IV  Wound Location :   midline sacrum with bone exposed Pain : {0/10     Abx : Present Written today     Overall Wound Assessment  Wound is has worsened slightly. Size has unchanged    Objective:    /60   Pulse 78   Temp 98.3 °F (36.8 °C) (Oral)   Resp 16   Pressure Ulcer 10/20/14 Coccyx #12 stage II pressure ulcer coccyx aquired 1-18-14 (Active)   Number of days: 1383       Pressure Ulcer 11/02/17 Coccyx Mid #15 blocked stage II coccyx ulcer acq: 10-22-17 (Active)   Kiera-wound Assessment Pink; Intact 7/6/2018  8:27 AM   Kiera-Wound Moisture Macerated 6/29/2018  8:40 AM   Pressure Ulcer Staging Stage II 11/2/2017  8:43 AM   Wound Assessment Pink;Red 7/6/2018  8:27 AM   Wound Length (cm) 6.9 cm 8/3/2018  9:47 AM   Wound Width (cm) 2.6 cm 8/3/2018  9:47 AM   Wound Depth (cm)  4.4 8/3/2018  9:47 AM   Calculated Wound Size (cm^2) (l*w) 17.94 cm^2 8/3/2018  9:47 AM   Change in Wound Size % (l*w) 70.54 8/3/2018  9:47 AM   Culture Taken Yes 8/3/2018  9:47 AM   Dressing Status Changed 8/3/2018 10:16 AM   Dressing Changed Changed/New 8/3/2018 10:16 AM   Dressing/Treatment ABD; Silver dressing;Dry dressing 8/3/2018 10:16 AM   Wound Cleansed Rinsed/Irrigated with saline; Wound cleanser 8/3/2018 10:16 AM   Dressing Change Due 06/23/18 6/22/2018  9:41 AM   Granulation Quality Red 8/3/2018  8:53 AM   Drainage Amount Large 8/3/2018  8:53 AM   Drainage Description Serosanguinous 8/3/2018  8:53 AM   Odor Mild 8/3/2018  8:53 AM   Distance Tunneling (cm) 1.6 cm 6/1/2018  8:57 AM   Tunneling Position ___ O'Clock 0.66 6/1/2018  8:57 AM   Undermining Starts ___ O'Clock 3 7/20/2018  9:25 AM   Undermining Ends___ O'Clock 6 7/20/2018  9:25 AM   Undermining Maxium Distance (cm) 1.5 7/20/2018  9:25 AM   [place]%Wound Bed 20 1/11/2018  8:26 AM   Red%Wound Bed 70 8/3/2018  8:53 AM   Yellow%Wound Bed 30 8/3/2018  8:53 AM   Debridement per physician Subcutaneous 3/16/2018  9:33 AM   Time out Yes 8/3/2018  9:47 AM   Procedural Pain 0 8/3/2018  9:47 AM   Post procedural Pain 0 6/15/2018  9:53 AM   Number of days: 274       Incision 10/06/14 Back Mid (Active)   Number of days: 7536     Wound   serous exudate noted  Errythema - Present    (this wound was originally measured on:)            Measurements shown are from today's visit. Assessment:     Please refer to nursing measurements and assessment regarding wound size pre and post debridement. Wound check - Care provided includes removal of existing dressing and visual inspection    Procedure: Debridement Note: Wound # 15. At 17.5 cm sq. Sacral bone exposed. The patient was placed in the right lateral position. Lidocaine  gauze was applied  at beginning of wound evaluation. An  Excisional Debridement was performed. Using a curette and tissue nippers ,  the wound was debrided sharply of all fibrotic, necrotic, and hyperkeratotic tissue, including a layer of surrounding healthy tissue to stimulate epithelization. The wound was excised through the level of the subcutaneous Wound Percentage debrided is 100%. Please refer to Nurses notes for pre and post debridement dimensions. Wound was irrigated with normal saline solution. Bleeding was with a small amount of bleeding, and controlled with pressure. Patient tolerated procedure well and was given proper instruction. Diagnosis: pressure ulcer: Stage IV. Evaluating for Osteomyelitis as wound not Improving                      Patient Active Problem List   Diagnosis Code    Decubitus skin ulcer.  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

## 2018-08-06 LAB
ANAEROBIC CULTURE: ABNORMAL
ANAEROBIC CULTURE: ABNORMAL
ORGANISM: ABNORMAL

## 2018-08-07 LAB
ORGANISM: ABNORMAL
ORGANISM: ABNORMAL
WOUND/ABSCESS: ABNORMAL

## 2018-08-10 ENCOUNTER — HOSPITAL ENCOUNTER (OUTPATIENT)
Age: 38
Discharge: HOME | End: 2018-08-10
Payer: MEDICARE

## 2018-08-10 ENCOUNTER — HOSPITAL ENCOUNTER (OUTPATIENT)
Dept: WOUND CARE | Age: 38
Discharge: HOME | End: 2018-08-10
Payer: MEDICARE

## 2018-08-10 VITALS
HEIGHT: 55 IN | BODY MASS INDEX: 42.35 KG/M2 | DIASTOLIC BLOOD PRESSURE: 70 MMHG | HEART RATE: 80 BPM | TEMPERATURE: 98.4 F | WEIGHT: 183 LBS | SYSTOLIC BLOOD PRESSURE: 114 MMHG | RESPIRATION RATE: 16 BRPM

## 2018-08-10 DIAGNOSIS — L98.422 SKIN ULCER OF BACK WITH FAT LAYER EXPOSED (HCC): Primary | ICD-10-CM

## 2018-08-10 DIAGNOSIS — Q05.1 SPINA BIFIDA OF THORACOLUMBAR REGION WITH HYDROCEPHALUS (HCC): ICD-10-CM

## 2018-08-10 DIAGNOSIS — M62.3 PARAPLEGIC IMMOBILITY SYNDROME: ICD-10-CM

## 2018-08-10 LAB
ANION GAP SERPL CALCULATED.3IONS-SCNC: 10 MMOL/L (ref 7–16)
BUN BLDV-MCNC: 10 MG/DL (ref 6–20)
CALCIUM SERPL-MCNC: 9.1 MG/DL (ref 8.6–10.2)
CHLORIDE BLD-SCNC: 104 MMOL/L (ref 98–107)
CO2: 23 MMOL/L (ref 22–29)
CREAT SERPL-MCNC: 0.5 MG/DL (ref 0.5–1)
CREATININE URINE: 56 MG/DL (ref 29–226)
GFR AFRICAN AMERICAN: >60
GFR NON-AFRICAN AMERICAN: >60 ML/MIN/1.73
GLUCOSE BLD-MCNC: 78 MG/DL (ref 74–109)
MAGNESIUM: 2.1 MG/DL (ref 1.6–2.6)
MICROALBUMIN UR-MCNC: 22.2 MG/L
MICROALBUMIN/CREAT UR-RTO: 39.6 (ref 0–30)
POTASSIUM SERPL-SCNC: 4.6 MMOL/L (ref 3.5–5)
SODIUM BLD-SCNC: 137 MMOL/L (ref 132–146)

## 2018-08-10 PROCEDURE — 82570 ASSAY OF URINE CREATININE: CPT

## 2018-08-10 PROCEDURE — 82044 UR ALBUMIN SEMIQUANTITATIVE: CPT

## 2018-08-10 PROCEDURE — 83735 ASSAY OF MAGNESIUM: CPT

## 2018-08-10 PROCEDURE — 36415 COLL VENOUS BLD VENIPUNCTURE: CPT

## 2018-08-10 PROCEDURE — 11042 DBRDMT SUBQ TIS 1ST 20SQCM/<: CPT

## 2018-08-10 PROCEDURE — 11042 DBRDMT SUBQ TIS 1ST 20SQCM/<: CPT | Performed by: FAMILY MEDICINE

## 2018-08-10 PROCEDURE — 80048 BASIC METABOLIC PNL TOTAL CA: CPT

## 2018-08-10 NOTE — PROGRESS NOTES
5 8/10/2018  8:47 AM   Undermining Maxium Distance (cm) 2.6 @3 8/10/2018  8:47 AM   [place]%Wound Bed 20 1/11/2018  8:26 AM   Red%Wound Bed 70 8/3/2018  8:53 AM   Yellow%Wound Bed 30 8/3/2018  8:53 AM   Exposed structure Bone 8/10/2018  9:31 AM   Debridement per physician Subcutaneous 3/16/2018  9:33 AM   Time out Yes 8/10/2018  9:31 AM   Procedural Pain 0 8/10/2018  9:31 AM   Post procedural Pain 0 8/10/2018  9:31 AM   Number of days: 281       Incision 10/06/14 Back Mid (Active)   Number of days: 2491     Wound   serous exudate noted  Errythema - Present    (this wound was originally measured on:)            Measurements shown are from today's visit. Assessment:     Please refer to nursing measurements and assessment regarding wound size pre and post debridement. Wound check - Care provided includes removal of existing dressing and visual inspection    Procedure: Debridement Note: Wound # 15. At 16.32 cm sq. The patient was placed in the right lateral position. Lidocaine  gauze was applied  at beginning of wound evaluation. An  Excisional Debridement was performed. Using a curette ,  the wound was debrided sharply of all fibrotic, necrotic, and hyperkeratotic tissue, including a layer of surrounding healthy tissue to stimulate epithelization. The wound was excised through the level of the subcutaneous Wound Percentage debrided is 100%. Please refer to Nurses notes for pre and post debridement dimensions. Wound was irrigated with normal saline solution. Bleeding was with a small amount of bleeding, and controlled with pressure. Patient tolerated procedure well and was given proper instruction. Diagnosis: pressure ulcer: Stage IV                      Patient Active Problem List   Diagnosis Code    Decubitus skin ulcer.  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

## 2018-08-17 ENCOUNTER — HOSPITAL ENCOUNTER (OUTPATIENT)
Dept: WOUND CARE | Age: 38
Discharge: HOME | End: 2018-08-17
Payer: MEDICARE

## 2018-08-17 VITALS
RESPIRATION RATE: 16 BRPM | DIASTOLIC BLOOD PRESSURE: 78 MMHG | HEART RATE: 60 BPM | SYSTOLIC BLOOD PRESSURE: 112 MMHG | TEMPERATURE: 98.8 F

## 2018-08-17 DIAGNOSIS — M46.28 OSTEOMYELITIS OF SACRUM (HCC): ICD-10-CM

## 2018-08-17 DIAGNOSIS — L89.154 DECUBITUS ULCER OF SACRAL REGION, STAGE 4 (HCC): Primary | ICD-10-CM

## 2018-08-17 PROCEDURE — 11042 DBRDMT SUBQ TIS 1ST 20SQCM/<: CPT | Performed by: FAMILY MEDICINE

## 2018-08-17 PROCEDURE — 11042 DBRDMT SUBQ TIS 1ST 20SQCM/<: CPT

## 2018-08-17 RX ORDER — ASCORBIC ACID 500 MG
500 TABLET ORAL DAILY
COMMUNITY

## 2018-08-17 RX ORDER — SULFAMETHOXAZOLE AND TRIMETHOPRIM 800; 160 MG/1; MG/1
1 TABLET ORAL 2 TIMES DAILY
Qty: 20 TABLET | Refills: 0 | Status: SHIPPED | OUTPATIENT
Start: 2018-08-17 | End: 2018-09-06 | Stop reason: ALTCHOICE

## 2018-08-17 NOTE — PROGRESS NOTES
Referring Physician : Mayra Mitchell DO  [id number] [facility] RECORD NUMBER:  [id number]  AGE: 34 y.o. GENDER: female  : 1980  EPISODE DATE:  2018    Subjective:     Chief Complaint   Patient presents with    Wound Check     COCCYX      HISTORY of PRESENT ILLNESS HPI   Sia Garber is a 34 y.o. female who presents today for wound/ulcer evaluation. History of Wound Context:  Long term ulcer Spina bifida.  MRI 3 days ago [facility] shows Osteomyelitis     Wound/Ulcer Pain Timing/Severity: none  Quality of pain: N/A  Severity:  0 / 10   Modifying Factors: None  Associated Signs/Symptoms: edema, erythema and drainage    Ulcer Identification:  Ulcer Type: pressure  Contributing Factors: edema, chronic pressure, decreased mobility and malnutrition    Diabetic/Pressure/Non Pressure Ulcers only:  Ulcer: Pressure ulcer, Stage 4    Wound: 12        PAST MEDICAL HISTORY      Diagnosis Date    Asthma     Blindness 2015    HLD (hyperlipidemia)     HTN (hypertension)     Spina bifida ([address]      Past Surgical History:   Procedure Laterality Date    CERVIX SURGERY      CSF SHUNT      OTHER SURGICAL HISTORY  unsure    back x 3    OTHER SURGICAL HISTORY  unsure    Right leg x 3    OTHER SURGICAL HISTORY  unsure    left leg x 3    OTHER SURGICAL HISTORY  as child    eye surgery    OTHER SURGICAL HISTORY  \"may\"    shunts in head    OTHER SURGICAL HISTORY      \"ostomy\"    OTHER SURGICAL HISTORY      \"perez stoma\"    PATENT DUCTUS ARTERIOUS LIGATION       Family History   Problem Relation Age of Onset    High Blood Pressure Father     High Blood Pressure Brother      Social History   Substance Use Topics    Smoking status: Never Smoker    Smokeless tobacco: Never Used    Alcohol use No     Allergies   Allergen Reactions    Latex Shortness Of Breath and Rash    Iv [Iodides] Shortness Of Breath and Swelling    Penicillins Swelling and Rash    Shellfish-Derived Products

## 2018-08-23 ENCOUNTER — HOSPITAL ENCOUNTER (OUTPATIENT)
Dept: WOUND CARE | Age: 38
Discharge: HOME | End: 2018-08-23
Payer: MEDICARE

## 2018-08-23 VITALS
SYSTOLIC BLOOD PRESSURE: 102 MMHG | RESPIRATION RATE: 16 BRPM | TEMPERATURE: 98.8 F | DIASTOLIC BLOOD PRESSURE: 64 MMHG | HEIGHT: 55 IN | WEIGHT: 183 LBS | BODY MASS INDEX: 42.35 KG/M2 | HEART RATE: 93 BPM

## 2018-08-23 DIAGNOSIS — M46.28 SACRAL OSTEOMYELITIS (HCC): Primary | ICD-10-CM

## 2018-08-23 DIAGNOSIS — M46.28 OSTEOMYELITIS OF SACRUM (HCC): ICD-10-CM

## 2018-08-23 PROCEDURE — 99213 OFFICE O/P EST LOW 20 MIN: CPT

## 2018-08-23 RX ORDER — SODIUM CHLORIDE 0.9 % (FLUSH) 0.9 %
10 SYRINGE (ML) INJECTION ONCE
Status: CANCELLED | OUTPATIENT
Start: 2018-08-24 | End: 2018-08-24

## 2018-08-23 RX ORDER — SODIUM CHLORIDE 0.9 % (FLUSH) 0.9 %
10 SYRINGE (ML) INJECTION PRN
Status: CANCELLED | OUTPATIENT
Start: 2018-08-24

## 2018-08-23 RX ORDER — METHYLPREDNISOLONE SODIUM SUCCINATE 125 MG/2ML
125 INJECTION, POWDER, LYOPHILIZED, FOR SOLUTION INTRAMUSCULAR; INTRAVENOUS ONCE
Status: CANCELLED | OUTPATIENT
Start: 2018-08-24 | End: 2018-08-24

## 2018-08-23 RX ORDER — HEPARIN SODIUM (PORCINE) LOCK FLUSH IV SOLN 100 UNIT/ML 100 UNIT/ML
500 SOLUTION INTRAVENOUS PRN
Status: CANCELLED | OUTPATIENT
Start: 2018-08-24

## 2018-08-23 RX ORDER — EPINEPHRINE 1 MG/ML
0.3 INJECTION, SOLUTION, CONCENTRATE INTRAVENOUS PRN
Status: CANCELLED | OUTPATIENT
Start: 2018-08-24

## 2018-08-23 RX ORDER — DIPHENHYDRAMINE HYDROCHLORIDE 50 MG/ML
50 INJECTION INTRAMUSCULAR; INTRAVENOUS ONCE
Status: CANCELLED | OUTPATIENT
Start: 2018-08-24 | End: 2018-08-24

## 2018-08-23 RX ORDER — SODIUM CHLORIDE 9 MG/ML
100 INJECTION, SOLUTION INTRAVENOUS CONTINUOUS
Status: CANCELLED | OUTPATIENT
Start: 2018-08-24

## 2018-08-23 RX ORDER — SODIUM CHLORIDE 0.9 % (FLUSH) 0.9 %
5 SYRINGE (ML) INJECTION PRN
Status: CANCELLED | OUTPATIENT
Start: 2018-08-24

## 2018-08-23 NOTE — PROGRESS NOTES
WOUND CARE NOTE          Maryam AQUILINO Ballardzaid  AGE:  34 y.o. GENDER: female    : 1980  TODAY'S DATE:  2018  Jesus Martinez,     Subjective:    Has chief complaint: sacral/pressure wound   Robyn Melendez is a 34 y.o. female who presents today for wound check. Patient has  has a past medical history of Asthma; Blindness; HLD (hyperlipidemia); HTN (hypertension); and Spina bifida ([address] .   Pt has  a sacral wound . The patient is tolerating antibiotics/bactrim. Denies fevers, chill or diarrhea. No nausea or vomiting. Has no pain. Per mother wounds appear better  Recent MRI at [facility].  FINDINGS CONSISTENT WITH OSTEOMYELITIS OF THE RESIDUAL SACROCOCCYGEAL   ELEMENTS UNDERLYING THE POSTERIOR, DEEP SOFT TISSUE WOUND. Medications:  Current Outpatient Prescriptions on File Prior to Encounter   Medication Sig Dispense Refill    vitamin C (ASCORBIC ACID) 500 MG tablet Take 500 mg by mouth daily      sulfamethoxazole-trimethoprim (BACTRIM DS) 800-160 MG per tablet Take 1 tablet by mouth 2 times daily 20 tablet 0    collagenase (SANTYL) 250 UNIT/GM ointment Apply topically daily.  90 g 1    naproxen (EC NAPROSYN) 500 MG EC tablet Take 500 mg by mouth as needed for Pain      enalapril (VASOTEC) 5 MG tablet Take 2.5 mg by mouth daily       albuterol sulfate HFA (PROAIR HFA) 108 (90 Base) MCG/ACT inhaler Inhale 2 puffs into the lungs every 6 hours as needed for Wheezing or Shortness of Breath      simvastatin (ZOCOR) 10 MG tablet Take 10 mg by mouth nightly      Cholecalciferol (VITAMIN D3) 2000 units CAPS Take 1 capsule by mouth nightly       venlafaxine (EFFEXOR) 75 MG tablet Take 75 mg by mouth nightly      ibuprofen (ADVIL) 200 MG CAPS Take 2 capsules by mouth daily as needed for Pain       acetaminophen (TYLENOL) 325 MG tablet Take 650 mg by mouth 3 times daily as needed for Pain       citalopram (CELEXA) 40 MG tablet Take 40 mg by mouth nightly        No current facility-administered medications on file prior to encounter. Allergy: Latex; Iv [iodides]; Penicillins; Shellfish-derived products; Hydrogen peroxide; Adhesive tape; and Bactrim [sulfamethoxazole-trimethoprim]  MEDICAL HISTORY  Past Medical History:   Diagnosis Date    Asthma     Blindness 12/2015    HLD (hyperlipidemia)     HTN (hypertension)     [address]ina [address] [address]       Past Surgical History:   Procedure Laterality Date    CERVIX SURGERY      CSF SHUNT      OTHER SURGICAL HISTORY  unsure    back x 3    OTHER SURGICAL HISTORY  unsure    Right leg x 3    OTHER SURGICAL HISTORY  unsure    left leg x 3    OTHER SURGICAL HISTORY  as child    eye surgery    OTHER SURGICAL HISTORY  \"may\"    shunts in head    OTHER SURGICAL HISTORY  2004    \"ostomy\"    OTHER SURGICAL HISTORY  2004    \"perez stoma\"    PATENT DUCTUS ARTERIOUS LIGATION  1980     FAMILY HISTORY  family history includes High Blood Pressure in her brother and father. SOCIAL HISTORY  Social History   Substance Use Topics    Smoking status: Never Smoker    Smokeless tobacco: Never Used    Alcohol use No       Objective:      /64   Pulse 93   Temp 98.8 °F (37.1 °C) (Oral)   Resp 16   Ht 4' 3\" (1.295 m)   Wt 183 lb (83 kg)   BMI 49.47 kg/m²     PE:  Awake, alert, NAD  HEENT: Atraumatic, normocephalic, pupils reactive, no thrush  Heart:  RRR, no murmurs, gallops, or rubs. Lungs:  CTA bilaterally, no wheeze, rales or rhonchi  Abd: bowel sounds present, nontender, nondistended, no masses  Extrem:  No clubbing, cyanosis, or edema  CNS: no focal deficits  SKIN: no rash spina bifida    Wound Care Documentation:    Wound:   wound margins intact and healing well. No signs of infection.   appearance is similar to last recheck      Pressure Ulcer 10/20/14 Coccyx #12 stage II pressure ulcer coccyx aquired 1-18-14 (Active)   Number of days: 1403       Pressure Ulcer 11/02/17 Coccyx Mid #15 blocked stage II coccyx ulcer acq: 10-22-17 (Active)   Kiera-wound

## 2018-08-29 ENCOUNTER — HOSPITAL ENCOUNTER (OUTPATIENT)
Age: 38
Discharge: HOME | End: 2018-08-31
Payer: MEDICARE

## 2018-08-29 ENCOUNTER — HOSPITAL ENCOUNTER (OUTPATIENT)
Dept: INTERVENTIONAL RADIOLOGY/VASCULAR | Age: 38
Discharge: HOME | End: 2018-08-29
Payer: MEDICARE

## 2018-08-29 ENCOUNTER — HOSPITAL ENCOUNTER (OUTPATIENT)
Dept: INFUSION THERAPY | Age: 38
Setting detail: INFUSION SERIES
Discharge: HOME | End: 2018-08-29
Payer: MEDICARE

## 2018-08-29 VITALS
OXYGEN SATURATION: 99 % | DIASTOLIC BLOOD PRESSURE: 81 MMHG | HEART RATE: 73 BPM | RESPIRATION RATE: 16 BRPM | SYSTOLIC BLOOD PRESSURE: 132 MMHG | TEMPERATURE: 98.3 F

## 2018-08-29 DIAGNOSIS — M46.28 OSTEOMYELITIS OF SACRUM (HCC): ICD-10-CM

## 2018-08-29 PROCEDURE — 96365 THER/PROPH/DIAG IV INF INIT: CPT

## 2018-08-29 PROCEDURE — 6360000002 HC RX W HCPCS: Performed by: SPECIALIST

## 2018-08-29 PROCEDURE — 76937 US GUIDE VASCULAR ACCESS: CPT | Performed by: RADIOLOGY

## 2018-08-29 PROCEDURE — 77001 FLUOROGUIDE FOR VEIN DEVICE: CPT | Performed by: RADIOLOGY

## 2018-08-29 PROCEDURE — C1751 CATH, INF, PER/CENT/MIDLINE: HCPCS

## 2018-08-29 PROCEDURE — 36569 INSJ PICC 5 YR+ W/O IMAGING: CPT | Performed by: RADIOLOGY

## 2018-08-29 PROCEDURE — 2580000003 HC RX 258: Performed by: SPECIALIST

## 2018-08-29 RX ORDER — HEPARIN SODIUM (PORCINE) LOCK FLUSH IV SOLN 100 UNIT/ML 100 UNIT/ML
500 SOLUTION INTRAVENOUS PRN
Status: DISCONTINUED | OUTPATIENT
Start: 2018-08-29 | End: 2018-08-30 | Stop reason: HOSPADM

## 2018-08-29 RX ORDER — SODIUM CHLORIDE 9 MG/ML
100 INJECTION, SOLUTION INTRAVENOUS CONTINUOUS
Status: CANCELLED | OUTPATIENT
Start: 2018-08-29

## 2018-08-29 RX ORDER — EPINEPHRINE 1 MG/ML
0.3 INJECTION, SOLUTION, CONCENTRATE INTRAVENOUS PRN
Status: CANCELLED | OUTPATIENT
Start: 2018-08-29

## 2018-08-29 RX ORDER — METHYLPREDNISOLONE SODIUM SUCCINATE 125 MG/2ML
125 INJECTION, POWDER, LYOPHILIZED, FOR SOLUTION INTRAMUSCULAR; INTRAVENOUS ONCE
Status: CANCELLED | OUTPATIENT
Start: 2018-08-29 | End: 2018-08-29

## 2018-08-29 RX ORDER — SODIUM CHLORIDE 0.9 % (FLUSH) 0.9 %
10 SYRINGE (ML) INJECTION PRN
Status: DISCONTINUED | OUTPATIENT
Start: 2018-08-29 | End: 2018-08-30 | Stop reason: HOSPADM

## 2018-08-29 RX ORDER — DIPHENHYDRAMINE HYDROCHLORIDE 50 MG/ML
50 INJECTION INTRAMUSCULAR; INTRAVENOUS ONCE
Status: CANCELLED | OUTPATIENT
Start: 2018-08-29 | End: 2018-08-29

## 2018-08-29 RX ORDER — SODIUM CHLORIDE 0.9 % (FLUSH) 0.9 %
10 SYRINGE (ML) INJECTION ONCE
Status: CANCELLED | OUTPATIENT
Start: 2018-08-29 | End: 2018-08-29

## 2018-08-29 RX ORDER — SODIUM CHLORIDE 0.9 % (FLUSH) 0.9 %
10 SYRINGE (ML) INJECTION PRN
Status: CANCELLED | OUTPATIENT
Start: 2018-08-29

## 2018-08-29 RX ORDER — HEPARIN SODIUM (PORCINE) LOCK FLUSH IV SOLN 100 UNIT/ML 100 UNIT/ML
500 SOLUTION INTRAVENOUS PRN
Status: CANCELLED | OUTPATIENT
Start: 2018-08-29

## 2018-08-29 RX ORDER — SODIUM CHLORIDE 0.9 % (FLUSH) 0.9 %
5 SYRINGE (ML) INJECTION PRN
Status: CANCELLED | OUTPATIENT
Start: 2018-08-29

## 2018-08-29 RX ADMIN — Medication 10 ML: at 15:38

## 2018-08-29 RX ADMIN — Medication 500 UNITS: at 15:38

## 2018-08-29 RX ADMIN — Medication 10 ML: at 15:05

## 2018-08-29 RX ADMIN — DEXTROSE MONOHYDRATE 2 G: 50 INJECTION, SOLUTION INTRAVENOUS at 15:04

## 2018-08-30 LAB
ALBUMIN SERPL-MCNC: 3.4 G/DL (ref 3.5–5.2)
ALP BLD-CCNC: 87 U/L (ref 35–104)
ALT SERPL-CCNC: 10 U/L (ref 0–32)
ANION GAP SERPL CALCULATED.3IONS-SCNC: 17 MMOL/L (ref 7–16)
AST SERPL-CCNC: 10 U/L (ref 0–31)
BASOPHILS ABSOLUTE: 0.04 E9/L (ref 0–0.2)
BASOPHILS RELATIVE PERCENT: 0.6 % (ref 0–2)
BILIRUB SERPL-MCNC: <0.2 MG/DL (ref 0–1.2)
BUN BLDV-MCNC: 8 MG/DL (ref 6–20)
C-REACTIVE PROTEIN: 3.3 MG/DL (ref 0–0.4)
CALCIUM SERPL-MCNC: 8.3 MG/DL (ref 8.6–10.2)
CHLORIDE BLD-SCNC: 99 MMOL/L (ref 98–107)
CO2: 22 MMOL/L (ref 22–29)
CREAT SERPL-MCNC: 0.4 MG/DL (ref 0.5–1)
EOSINOPHILS ABSOLUTE: 0.2 E9/L (ref 0.05–0.5)
EOSINOPHILS RELATIVE PERCENT: 2.8 % (ref 0–6)
GFR AFRICAN AMERICAN: >60
GFR NON-AFRICAN AMERICAN: >60 ML/MIN/1.73
GLUCOSE BLD-MCNC: 120 MG/DL (ref 74–109)
HCT VFR BLD CALC: 34.9 % (ref 34–48)
HEMOGLOBIN: 10.6 G/DL (ref 11.5–15.5)
IMMATURE GRANULOCYTES #: 0.02 E9/L
IMMATURE GRANULOCYTES %: 0.3 % (ref 0–5)
LYMPHOCYTES ABSOLUTE: 1.61 E9/L (ref 1.5–4)
LYMPHOCYTES RELATIVE PERCENT: 22.1 % (ref 20–42)
MCH RBC QN AUTO: 24.2 PG (ref 26–35)
MCHC RBC AUTO-ENTMCNC: 30.4 % (ref 32–34.5)
MCV RBC AUTO: 79.7 FL (ref 80–99.9)
MONOCYTES ABSOLUTE: 0.34 E9/L (ref 0.1–0.95)
MONOCYTES RELATIVE PERCENT: 4.7 % (ref 2–12)
NEUTROPHILS ABSOLUTE: 5.06 E9/L (ref 1.8–7.3)
NEUTROPHILS RELATIVE PERCENT: 69.5 % (ref 43–80)
PDW BLD-RTO: 17.1 FL (ref 11.5–15)
PLATELET # BLD: 333 E9/L (ref 130–450)
PMV BLD AUTO: 10.2 FL (ref 7–12)
POTASSIUM SERPL-SCNC: 3.4 MMOL/L (ref 3.5–5)
RBC # BLD: 4.38 E12/L (ref 3.5–5.5)
SEDIMENTATION RATE, ERYTHROCYTE: 43 MM/HR (ref 0–20)
SODIUM BLD-SCNC: 138 MMOL/L (ref 132–146)
TOTAL PROTEIN: 7 G/DL (ref 6.4–8.3)
WBC # BLD: 7.3 E9/L (ref 4.5–11.5)

## 2018-08-30 PROCEDURE — 85651 RBC SED RATE NONAUTOMATED: CPT

## 2018-08-30 PROCEDURE — 85025 COMPLETE CBC W/AUTO DIFF WBC: CPT

## 2018-08-30 PROCEDURE — 86140 C-REACTIVE PROTEIN: CPT

## 2018-08-30 PROCEDURE — 36415 COLL VENOUS BLD VENIPUNCTURE: CPT

## 2018-08-30 PROCEDURE — 80053 COMPREHEN METABOLIC PANEL: CPT

## 2018-08-31 ENCOUNTER — HOSPITAL ENCOUNTER (OUTPATIENT)
Dept: WOUND CARE | Age: 38
Discharge: HOME | End: 2018-08-31
Payer: MEDICARE

## 2018-08-31 VITALS
RESPIRATION RATE: 14 BRPM | SYSTOLIC BLOOD PRESSURE: 100 MMHG | OXYGEN SATURATION: 98 % | HEART RATE: 62 BPM | TEMPERATURE: 98.2 F | DIASTOLIC BLOOD PRESSURE: 60 MMHG

## 2018-08-31 DIAGNOSIS — M46.28 OSTEOMYELITIS OF SACRUM (HCC): ICD-10-CM

## 2018-08-31 DIAGNOSIS — L98.422 SKIN ULCER OF BACK WITH FAT LAYER EXPOSED (HCC): ICD-10-CM

## 2018-08-31 PROCEDURE — 11042 DBRDMT SUBQ TIS 1ST 20SQCM/<: CPT

## 2018-08-31 PROCEDURE — 11042 DBRDMT SUBQ TIS 1ST 20SQCM/<: CPT | Performed by: FAMILY MEDICINE

## 2018-08-31 NOTE — PROGRESS NOTES
Follow-Up Wound Progress Note  Maryam Casper  AGE: 34 y.o. GENDER: female  DOD: 1980  TODAY'S DATE:  8/31/18  Subjective:    Clovis Freeman is a 34 y.o. female who presents today for wound check. Wound Etiology :  pressure ulcer: Stage IV  Wound Location :  midline and sacral with associated Osteomyelitis Pain : {0/10     Abx : Present Per [facility]     Overall Wound Assessment  Wound is has slightly improved. Size has unchanged    Objective:    /60   Pulse 62   Temp 98.2 °F (36.8 °C) (Oral)   Resp 14   SpO2 98%   Pressure Ulcer 10/20/14 Coccyx #12 stage II pressure ulcer coccyx aquired 1-18-14 (Active)   Number of days: 1411       Pressure Ulcer 11/02/17 Coccyx Mid #15 blocked stage II coccyx ulcer acq: 10-22-17 (Active)   Kiera-wound Assessment Fragile;Pale;Pink 8/31/2018  8:38 AM   Kiera-Wound Moisture Intact 8/23/2018  3:53 PM   Pressure Ulcer Staging Stage II 11/2/2017  8:43 AM   Wound Assessment Pink;Red;Yellow 8/31/2018  8:38 AM   Wound Length (cm) 6 cm 8/31/2018  9:42 AM   Wound Width (cm) 3 cm 8/31/2018  9:42 AM   Wound Depth (cm)  2.8 8/31/2018  9:42 AM   Calculated Wound Size (cm^2) (l*w) 18 cm^2 8/31/2018  9:42 AM   Change in Wound Size % (l*w) 70.44 8/31/2018  9:42 AM   Culture Taken Yes 8/3/2018  9:47 AM   Dressing Status Clean;Dry; Intact 8/31/2018  9:56 AM   Dressing Changed Changed/New 8/31/2018  9:56 AM   Dressing/Treatment ABD; Silver dressing;Dry dressing 8/31/2018  9:56 AM   Wound Cleansed Rinsed/Irrigated with saline; Wound cleanser 8/31/2018  9:56 AM   Dressing Change Due 06/23/18 6/22/2018  9:41 AM   Granulation Quality Red 8/23/2018  3:53 PM   Drainage Amount Large 8/31/2018  8:38 AM   Drainage Description Serosanguinous 8/31/2018  8:38 AM   Odor None 8/31/2018  8:38 AM   Distance Tunneling (cm) 1.6 cm 6/1/2018  8:57 AM   Tunneling Position ___ O'Clock 0.66 6/1/2018  8:57 AM   Undermining Starts ___ O'Clock 12 8/23/2018  3:53 PM   Undermining Ends___ O'Clock 12

## 2018-09-03 ENCOUNTER — HOSPITAL ENCOUNTER (OUTPATIENT)
Age: 38
Discharge: HOME | End: 2018-09-05
Payer: MEDICARE

## 2018-09-03 LAB
ALBUMIN SERPL-MCNC: 3.5 G/DL (ref 3.5–5.2)
ALP BLD-CCNC: 81 U/L (ref 35–104)
ALT SERPL-CCNC: <5 U/L (ref 0–32)
ANION GAP SERPL CALCULATED.3IONS-SCNC: 9 MMOL/L (ref 7–16)
AST SERPL-CCNC: 9 U/L (ref 0–31)
BASOPHILS ABSOLUTE: 0.03 E9/L (ref 0–0.2)
BASOPHILS RELATIVE PERCENT: 0.5 % (ref 0–2)
BILIRUB SERPL-MCNC: <0.2 MG/DL (ref 0–1.2)
BUN BLDV-MCNC: 8 MG/DL (ref 6–20)
CALCIUM SERPL-MCNC: 8.5 MG/DL (ref 8.6–10.2)
CHLORIDE BLD-SCNC: 104 MMOL/L (ref 98–107)
CO2: 24 MMOL/L (ref 22–29)
CREAT SERPL-MCNC: 0.4 MG/DL (ref 0.5–1)
EOSINOPHILS ABSOLUTE: 0.18 E9/L (ref 0.05–0.5)
EOSINOPHILS RELATIVE PERCENT: 2.8 % (ref 0–6)
GFR AFRICAN AMERICAN: >60
GFR NON-AFRICAN AMERICAN: >60 ML/MIN/1.73
GLUCOSE BLD-MCNC: 99 MG/DL (ref 74–109)
HCT VFR BLD CALC: 35.2 % (ref 34–48)
HEMOGLOBIN: 10.7 G/DL (ref 11.5–15.5)
IMMATURE GRANULOCYTES #: 0.02 E9/L
IMMATURE GRANULOCYTES %: 0.3 % (ref 0–5)
LYMPHOCYTES ABSOLUTE: 0.72 E9/L (ref 1.5–4)
LYMPHOCYTES RELATIVE PERCENT: 11.1 % (ref 20–42)
MCH RBC QN AUTO: 24.5 PG (ref 26–35)
MCHC RBC AUTO-ENTMCNC: 30.4 % (ref 32–34.5)
MCV RBC AUTO: 80.5 FL (ref 80–99.9)
MONOCYTES ABSOLUTE: 0.28 E9/L (ref 0.1–0.95)
MONOCYTES RELATIVE PERCENT: 4.3 % (ref 2–12)
NEUTROPHILS ABSOLUTE: 5.25 E9/L (ref 1.8–7.3)
NEUTROPHILS RELATIVE PERCENT: 81 % (ref 43–80)
PDW BLD-RTO: 17.4 FL (ref 11.5–15)
PLATELET # BLD: 282 E9/L (ref 130–450)
PMV BLD AUTO: 10.1 FL (ref 7–12)
POTASSIUM SERPL-SCNC: 3.9 MMOL/L (ref 3.5–5)
RBC # BLD: 4.37 E12/L (ref 3.5–5.5)
SODIUM BLD-SCNC: 137 MMOL/L (ref 132–146)
TOTAL PROTEIN: 7.2 G/DL (ref 6.4–8.3)
WBC # BLD: 6.5 E9/L (ref 4.5–11.5)

## 2018-09-03 PROCEDURE — 85025 COMPLETE CBC W/AUTO DIFF WBC: CPT

## 2018-09-03 PROCEDURE — 80053 COMPREHEN METABOLIC PANEL: CPT

## 2018-09-03 PROCEDURE — 86140 C-REACTIVE PROTEIN: CPT

## 2018-09-03 PROCEDURE — 85651 RBC SED RATE NONAUTOMATED: CPT

## 2018-09-03 PROCEDURE — 36415 COLL VENOUS BLD VENIPUNCTURE: CPT

## 2018-09-06 ENCOUNTER — HOSPITAL ENCOUNTER (OUTPATIENT)
Age: 38
Discharge: HOME | End: 2018-09-08
Payer: MEDICARE

## 2018-09-06 ENCOUNTER — HOSPITAL ENCOUNTER (OUTPATIENT)
Dept: WOUND CARE | Age: 38
Discharge: HOME | End: 2018-09-06
Payer: MEDICARE

## 2018-09-06 VITALS
SYSTOLIC BLOOD PRESSURE: 100 MMHG | RESPIRATION RATE: 16 BRPM | HEART RATE: 60 BPM | TEMPERATURE: 98.5 F | DIASTOLIC BLOOD PRESSURE: 60 MMHG

## 2018-09-06 LAB
ALBUMIN SERPL-MCNC: 3.4 G/DL (ref 3.5–5.2)
ALP BLD-CCNC: 77 U/L (ref 35–104)
ALT SERPL-CCNC: <5 U/L (ref 0–32)
ANION GAP SERPL CALCULATED.3IONS-SCNC: 16 MMOL/L (ref 7–16)
AST SERPL-CCNC: 15 U/L (ref 0–31)
BASOPHILS ABSOLUTE: 0.03 E9/L (ref 0–0.2)
BASOPHILS RELATIVE PERCENT: 0.6 % (ref 0–2)
BILIRUB SERPL-MCNC: <0.2 MG/DL (ref 0–1.2)
BUN BLDV-MCNC: 8 MG/DL (ref 6–20)
CALCIUM SERPL-MCNC: 8.1 MG/DL (ref 8.6–10.2)
CHLORIDE BLD-SCNC: 102 MMOL/L (ref 98–107)
CO2: 21 MMOL/L (ref 22–29)
CREAT SERPL-MCNC: 0.4 MG/DL (ref 0.5–1)
EOSINOPHILS ABSOLUTE: 0.24 E9/L (ref 0.05–0.5)
EOSINOPHILS RELATIVE PERCENT: 4.7 % (ref 0–6)
GFR AFRICAN AMERICAN: >60
GFR NON-AFRICAN AMERICAN: >60 ML/MIN/1.73
GLUCOSE BLD-MCNC: 141 MG/DL (ref 74–109)
HCT VFR BLD CALC: 34.4 % (ref 34–48)
HEMOGLOBIN: 10.3 G/DL (ref 11.5–15.5)
IMMATURE GRANULOCYTES #: 0.02 E9/L
IMMATURE GRANULOCYTES %: 0.4 % (ref 0–5)
LYMPHOCYTES ABSOLUTE: 1.22 E9/L (ref 1.5–4)
LYMPHOCYTES RELATIVE PERCENT: 23.8 % (ref 20–42)
MCH RBC QN AUTO: 24.1 PG (ref 26–35)
MCHC RBC AUTO-ENTMCNC: 29.9 % (ref 32–34.5)
MCV RBC AUTO: 80.6 FL (ref 80–99.9)
MONOCYTES ABSOLUTE: 0.23 E9/L (ref 0.1–0.95)
MONOCYTES RELATIVE PERCENT: 4.5 % (ref 2–12)
NEUTROPHILS ABSOLUTE: 3.39 E9/L (ref 1.8–7.3)
NEUTROPHILS RELATIVE PERCENT: 66 % (ref 43–80)
PDW BLD-RTO: 17.8 FL (ref 11.5–15)
PLATELET # BLD: 268 E9/L (ref 130–450)
PMV BLD AUTO: 10.1 FL (ref 7–12)
POTASSIUM SERPL-SCNC: 3.9 MMOL/L (ref 3.5–5)
RBC # BLD: 4.27 E12/L (ref 3.5–5.5)
SODIUM BLD-SCNC: 139 MMOL/L (ref 132–146)
TOTAL PROTEIN: 6.8 G/DL (ref 6.4–8.3)
WBC # BLD: 5.1 E9/L (ref 4.5–11.5)

## 2018-09-06 PROCEDURE — 99213 OFFICE O/P EST LOW 20 MIN: CPT

## 2018-09-06 PROCEDURE — 80053 COMPREHEN METABOLIC PANEL: CPT

## 2018-09-06 PROCEDURE — 85025 COMPLETE CBC W/AUTO DIFF WBC: CPT

## 2018-09-06 NOTE — PROGRESS NOTES
Take 40 mg by mouth nightly        No current facility-administered medications on file prior to encounter. Allergy: Latex; Iv [iodides]; Penicillins; Shellfish-derived products; Hydrogen peroxide; Adhesive tape; and Bactrim [sulfamethoxazole-trimethoprim]  MEDICAL HISTORY  Past Medical History:   Diagnosis Date    Asthma     Blindness 12/2015    HLD (hyperlipidemia)     HTN (hypertension)     Spina bifida ([address]       Past Surgical History:   Procedure Laterality Date    CERVIX SURGERY      CSF SHUNT      OTHER SURGICAL HISTORY  unsure    back x 3    OTHER SURGICAL HISTORY  unsure    Right leg x 3    OTHER SURGICAL HISTORY  unsure    left leg x 3    OTHER SURGICAL HISTORY  as child    eye surgery    OTHER SURGICAL HISTORY  \"may\"    shunts in head    OTHER SURGICAL HISTORY  2004    \"ostomy\"    OTHER SURGICAL HISTORY  2004    \"perez stoma\"    PATENT DUCTUS ARTERIOUS LIGATION  1980     FAMILY HISTORY  family history includes High Blood Pressure in her brother and father. SOCIAL HISTORY  Social History   Substance Use Topics    Smoking status: Never Smoker    Smokeless tobacco: Never Used    Alcohol use No       Objective:      /60   Pulse 60   Temp 98.5 °F (36.9 °C) (Oral)   Resp 16     PE:  Awake, alert, NAD  HEENT: Atraumatic, normocephalic, pupils reactive, no thrush  Heart:  RRR, no murmurs, gallops, or rubs. Lungs:  CTA bilaterally, no wheeze, rales or rhonchi  Abd: bowel sounds present, nontender, nondistended, no masses  Extrem:  No clubbing, cyanosis, or edema  CNS: no focal deficits  SKIN: no rash spina bifida    Wound Care Documentation:    Wound:   wound margins intact and healing well.   No signs of infection.  k      Pressure Ulcer 10/20/14 Coccyx #12 stage II pressure ulcer coccyx aquired 1-18-14 (Active)   Number of days: 1403       Pressure Ulcer 11/02/17 Coccyx Mid #15 blocked stage II coccyx ulcer acq: 10-22-17 (Active)   Kiera-wound Assessment Excoriated;Pink 8/17/2018  8:39 AM   Kiera-Wound Moisture Macerated 6/29/2018  8:40 AM   Pressure Ulcer Staging Stage II 11/2/2017  8:43 AM   Wound Assessment Red;Yellow;Pink 8/17/2018  8:39 AM   Wound Length (cm) 7 cm 8/17/2018  9:23 AM   Wound Width (cm) 2.6 cm 8/17/2018  9:23 AM   Wound Depth (cm)  3.0 8/17/2018  9:23 AM   Calculated Wound Size (cm^2) (l*w) 18.2 cm^2 8/17/2018  9:23 AM   Change in Wound Size % (l*w) 70.11 8/17/2018  9:23 AM   Culture Taken Yes 8/3/2018  9:47 AM   Dressing Status Old drainage 8/17/2018  9:43 AM   Dressing Changed Changed/New 8/17/2018  9:43 AM   Dressing/Treatment ABD; Silver dressing;Dry dressing 8/17/2018  9:43 AM   Wound Cleansed Rinsed/Irrigated with saline; Wound cleanser 8/17/2018  9:43 AM   Dressing Change Due 06/23/18 6/22/2018  9:41 AM   Granulation Quality Red 8/3/2018  8:53 AM   Drainage Amount Large 8/17/2018  8:39 AM   Drainage Description Serosanguinous 8/17/2018  8:39 AM   Odor None 8/17/2018  8:39 AM   Distance Tunneling (cm) 1.6 cm 6/1/2018  8:57 AM   Tunneling Position ___ O'Clock 0.66 6/1/2018  8:57 AM   Undermining Starts ___ O'Clock 12 8/17/2018  8:39 AM   Undermining Ends___ O'Clock 12 8/17/2018  8:39 AM   Undermining Maxium Distance (cm) 4.0 8/17/2018  8:39 AM   [place]%Wound Bed 20 1/11/2018  8:26 AM   Red%Wound Bed 70 8/3/2018  8:53 AM   Yellow%Wound Bed 30 8/3/2018  8:53 AM   Exposed structure Bone 8/10/2018  9:31 AM   Debridement per physician None 8/23/2018  3:53 PM   Time out Yes 8/23/2018  3:53 PM   Procedural Pain 0 8/17/2018  9:23 AM   Post procedural Pain 0 8/10/2018  9:31 AM   Number of days: 294       Incision 10/06/14 Back Mid (Active)   Number of days: 8116       DATA:    CBC:   Lab Results   Component Value Date    WBC 6.5 09/03/2018    RBC 4.37 09/03/2018    HGB 10.7 09/03/2018    HCT 35.2 09/03/2018    MCV 80.5 09/03/2018    MCH 24.5 09/03/2018    MCHC 30.4 09/03/2018    RDW 17.4 09/03/2018     09/03/2018    MPV 10.1 09/03/2018     CMP:    Lab Results

## 2018-09-06 NOTE — PLAN OF CARE
Problem: Pressure Ulcer:  Goal: Signs of wound healing will improve  Signs of wound healing will improve   Outcome: Ongoing    Goal: Absence of new pressure ulcer  Absence of new pressure ulcer   Outcome: Ongoing    Goal: Will show no infection signs and symptoms  Will show no infection signs and symptoms   Outcome: Ongoing

## 2018-09-10 ENCOUNTER — HOSPITAL ENCOUNTER (OUTPATIENT)
Age: 38
Discharge: HOME | End: 2018-09-12
Payer: MEDICARE

## 2018-09-10 LAB
ALBUMIN SERPL-MCNC: 3.7 G/DL (ref 3.5–5.2)
ALP BLD-CCNC: 83 U/L (ref 35–104)
ALT SERPL-CCNC: <5 U/L (ref 0–32)
ANION GAP SERPL CALCULATED.3IONS-SCNC: 14 MMOL/L (ref 7–16)
AST SERPL-CCNC: 20 U/L (ref 0–31)
BASOPHILS ABSOLUTE: 0.05 E9/L (ref 0–0.2)
BASOPHILS RELATIVE PERCENT: 0.4 % (ref 0–2)
BILIRUB SERPL-MCNC: <0.2 MG/DL (ref 0–1.2)
BUN BLDV-MCNC: 12 MG/DL (ref 6–20)
C-REACTIVE PROTEIN: 1.2 MG/DL (ref 0–0.4)
CALCIUM SERPL-MCNC: 8.7 MG/DL (ref 8.6–10.2)
CHLORIDE BLD-SCNC: 102 MMOL/L (ref 98–107)
CO2: 22 MMOL/L (ref 22–29)
CREAT SERPL-MCNC: 0.4 MG/DL (ref 0.5–1)
EOSINOPHILS ABSOLUTE: 0.18 E9/L (ref 0.05–0.5)
EOSINOPHILS RELATIVE PERCENT: 1.6 % (ref 0–6)
GFR AFRICAN AMERICAN: >60
GFR NON-AFRICAN AMERICAN: >60 ML/MIN/1.73
GLUCOSE BLD-MCNC: 155 MG/DL (ref 74–109)
HCT VFR BLD CALC: 36.6 % (ref 34–48)
HEMOGLOBIN: 10.9 G/DL (ref 11.5–15.5)
IMMATURE GRANULOCYTES #: 0.05 E9/L
IMMATURE GRANULOCYTES %: 0.4 % (ref 0–5)
LYMPHOCYTES ABSOLUTE: 0.8 E9/L (ref 1.5–4)
LYMPHOCYTES RELATIVE PERCENT: 6.9 % (ref 20–42)
MCH RBC QN AUTO: 24.4 PG (ref 26–35)
MCHC RBC AUTO-ENTMCNC: 29.8 % (ref 32–34.5)
MCV RBC AUTO: 81.9 FL (ref 80–99.9)
MONOCYTES ABSOLUTE: 0.27 E9/L (ref 0.1–0.95)
MONOCYTES RELATIVE PERCENT: 2.3 % (ref 2–12)
NEUTROPHILS ABSOLUTE: 10.17 E9/L (ref 1.8–7.3)
NEUTROPHILS RELATIVE PERCENT: 88.4 % (ref 43–80)
PDW BLD-RTO: 17.8 FL (ref 11.5–15)
PLATELET # BLD: 277 E9/L (ref 130–450)
PMV BLD AUTO: 10.2 FL (ref 7–12)
POTASSIUM SERPL-SCNC: 3.6 MMOL/L (ref 3.5–5)
RBC # BLD: 4.47 E12/L (ref 3.5–5.5)
SEDIMENTATION RATE, ERYTHROCYTE: 49 MM/HR (ref 0–20)
SODIUM BLD-SCNC: 138 MMOL/L (ref 132–146)
TOTAL PROTEIN: 7.2 G/DL (ref 6.4–8.3)
WBC # BLD: 11.5 E9/L (ref 4.5–11.5)

## 2018-09-10 PROCEDURE — 85025 COMPLETE CBC W/AUTO DIFF WBC: CPT

## 2018-09-10 PROCEDURE — 36415 COLL VENOUS BLD VENIPUNCTURE: CPT

## 2018-09-10 PROCEDURE — 85651 RBC SED RATE NONAUTOMATED: CPT

## 2018-09-10 PROCEDURE — 86140 C-REACTIVE PROTEIN: CPT

## 2018-09-10 PROCEDURE — 80053 COMPREHEN METABOLIC PANEL: CPT

## 2018-09-12 ENCOUNTER — HOSPITAL ENCOUNTER (OUTPATIENT)
Age: 38
Discharge: HOME | End: 2018-09-14
Payer: MEDICARE

## 2018-09-12 LAB
ALBUMIN SERPL-MCNC: 3.5 G/DL (ref 3.5–5.2)
ALP BLD-CCNC: 73 U/L (ref 35–104)
ALT SERPL-CCNC: <5 U/L (ref 0–32)
ANION GAP SERPL CALCULATED.3IONS-SCNC: 17 MMOL/L (ref 7–16)
AST SERPL-CCNC: 17 U/L (ref 0–31)
BILIRUB SERPL-MCNC: <0.2 MG/DL (ref 0–1.2)
BUN BLDV-MCNC: 10 MG/DL (ref 6–20)
CALCIUM SERPL-MCNC: 8.5 MG/DL (ref 8.6–10.2)
CHLORIDE BLD-SCNC: 102 MMOL/L (ref 98–107)
CO2: 21 MMOL/L (ref 22–29)
CREAT SERPL-MCNC: 0.3 MG/DL (ref 0.5–1)
GFR AFRICAN AMERICAN: >60
GFR NON-AFRICAN AMERICAN: >60 ML/MIN/1.73
GLUCOSE BLD-MCNC: 31 MG/DL (ref 74–109)
POTASSIUM SERPL-SCNC: 3.6 MMOL/L (ref 3.5–5)
SODIUM BLD-SCNC: 140 MMOL/L (ref 132–146)
TOTAL PROTEIN: 7.1 G/DL (ref 6.4–8.3)

## 2018-09-12 PROCEDURE — 87040 BLOOD CULTURE FOR BACTERIA: CPT

## 2018-09-12 PROCEDURE — 85025 COMPLETE CBC W/AUTO DIFF WBC: CPT

## 2018-09-12 PROCEDURE — 80053 COMPREHEN METABOLIC PANEL: CPT

## 2018-09-13 LAB
ANISOCYTOSIS: ABNORMAL
BASOPHILS ABSOLUTE: 0.03 E9/L (ref 0–0.2)
BASOPHILS RELATIVE PERCENT: 0.4 % (ref 0–2)
BURR CELLS: ABNORMAL
EOSINOPHILS ABSOLUTE: 0.26 E9/L (ref 0.05–0.5)
EOSINOPHILS RELATIVE PERCENT: 3.6 % (ref 0–6)
HCT VFR BLD CALC: 35.5 % (ref 34–48)
HEMOGLOBIN: 10.2 G/DL (ref 11.5–15.5)
HYPOCHROMIA: ABNORMAL
IMMATURE GRANULOCYTES #: 0.03 E9/L
IMMATURE GRANULOCYTES %: 0.4 % (ref 0–5)
LYMPHOCYTES ABSOLUTE: 1.12 E9/L (ref 1.5–4)
LYMPHOCYTES RELATIVE PERCENT: 15.7 % (ref 20–42)
MCH RBC QN AUTO: 24.1 PG (ref 26–35)
MCHC RBC AUTO-ENTMCNC: 28.7 % (ref 32–34.5)
MCV RBC AUTO: 83.7 FL (ref 80–99.9)
MONOCYTES ABSOLUTE: 0.44 E9/L (ref 0.1–0.95)
MONOCYTES RELATIVE PERCENT: 6.2 % (ref 2–12)
NEUTROPHILS ABSOLUTE: 5.27 E9/L (ref 1.8–7.3)
NEUTROPHILS RELATIVE PERCENT: 73.7 % (ref 43–80)
OVALOCYTES: ABNORMAL
PDW BLD-RTO: 18.4 FL (ref 11.5–15)
PLATELET # BLD: 264 E9/L (ref 130–450)
PMV BLD AUTO: 10 FL (ref 7–12)
POIKILOCYTES: ABNORMAL
POLYCHROMASIA: ABNORMAL
RBC # BLD: 4.24 E12/L (ref 3.5–5.5)
WBC # BLD: 7.2 E9/L (ref 4.5–11.5)

## 2018-09-14 ENCOUNTER — HOSPITAL ENCOUNTER (OUTPATIENT)
Dept: WOUND CARE | Age: 38
Discharge: HOME | End: 2018-09-14
Payer: MEDICARE

## 2018-09-14 VITALS
DIASTOLIC BLOOD PRESSURE: 62 MMHG | RESPIRATION RATE: 16 BRPM | TEMPERATURE: 97.8 F | HEART RATE: 88 BPM | SYSTOLIC BLOOD PRESSURE: 100 MMHG

## 2018-09-14 DIAGNOSIS — L98.422 SKIN ULCER OF BACK WITH FAT LAYER EXPOSED (HCC): ICD-10-CM

## 2018-09-14 DIAGNOSIS — M46.28 OSTEOMYELITIS OF SACRUM (HCC): ICD-10-CM

## 2018-09-14 PROCEDURE — 11042 DBRDMT SUBQ TIS 1ST 20SQCM/<: CPT

## 2018-09-14 PROCEDURE — 11042 DBRDMT SUBQ TIS 1ST 20SQCM/<: CPT | Performed by: FAMILY MEDICINE

## 2018-09-17 ENCOUNTER — HOSPITAL ENCOUNTER (OUTPATIENT)
Age: 38
Discharge: HOME | End: 2018-09-19
Payer: MEDICARE

## 2018-09-17 LAB
ALBUMIN SERPL-MCNC: 3.8 G/DL (ref 3.5–5.2)
ALP BLD-CCNC: 86 U/L (ref 35–104)
ALT SERPL-CCNC: <5 U/L (ref 0–32)
ANION GAP SERPL CALCULATED.3IONS-SCNC: 18 MMOL/L (ref 7–16)
AST SERPL-CCNC: 14 U/L (ref 0–31)
BASOPHILS ABSOLUTE: 0.03 E9/L (ref 0–0.2)
BASOPHILS RELATIVE PERCENT: 0.6 % (ref 0–2)
BILIRUB SERPL-MCNC: 0.3 MG/DL (ref 0–1.2)
BLOOD CULTURE, ROUTINE: NORMAL
BUN BLDV-MCNC: 7 MG/DL (ref 6–20)
C-REACTIVE PROTEIN: 3 MG/DL (ref 0–0.4)
CALCIUM SERPL-MCNC: 9 MG/DL (ref 8.6–10.2)
CHLORIDE BLD-SCNC: 102 MMOL/L (ref 98–107)
CO2: 21 MMOL/L (ref 22–29)
CREAT SERPL-MCNC: 0.4 MG/DL (ref 0.5–1)
CULTURE, BLOOD 2: NORMAL
EOSINOPHILS ABSOLUTE: 0.21 E9/L (ref 0.05–0.5)
EOSINOPHILS RELATIVE PERCENT: 3.9 % (ref 0–6)
GFR AFRICAN AMERICAN: >60
GFR NON-AFRICAN AMERICAN: >60 ML/MIN/1.73
GLUCOSE BLD-MCNC: 163 MG/DL (ref 74–109)
HCT VFR BLD CALC: 38.4 % (ref 34–48)
HEMOGLOBIN: 11.6 G/DL (ref 11.5–15.5)
IMMATURE GRANULOCYTES #: 0.01 E9/L
IMMATURE GRANULOCYTES %: 0.2 % (ref 0–5)
LYMPHOCYTES ABSOLUTE: 0.99 E9/L (ref 1.5–4)
LYMPHOCYTES RELATIVE PERCENT: 18.6 % (ref 20–42)
MCH RBC QN AUTO: 24.5 PG (ref 26–35)
MCHC RBC AUTO-ENTMCNC: 30.2 % (ref 32–34.5)
MCV RBC AUTO: 81 FL (ref 80–99.9)
MONOCYTES ABSOLUTE: 0.19 E9/L (ref 0.1–0.95)
MONOCYTES RELATIVE PERCENT: 3.6 % (ref 2–12)
NEUTROPHILS ABSOLUTE: 3.89 E9/L (ref 1.8–7.3)
NEUTROPHILS RELATIVE PERCENT: 73.1 % (ref 43–80)
PDW BLD-RTO: 17.8 FL (ref 11.5–15)
PLATELET # BLD: 293 E9/L (ref 130–450)
PMV BLD AUTO: 10.6 FL (ref 7–12)
POTASSIUM SERPL-SCNC: 4 MMOL/L (ref 3.5–5)
RBC # BLD: 4.74 E12/L (ref 3.5–5.5)
SEDIMENTATION RATE, ERYTHROCYTE: 85 MM/HR (ref 0–20)
SODIUM BLD-SCNC: 141 MMOL/L (ref 132–146)
TOTAL PROTEIN: 7.8 G/DL (ref 6.4–8.3)
WBC # BLD: 5.3 E9/L (ref 4.5–11.5)

## 2018-09-17 PROCEDURE — 80053 COMPREHEN METABOLIC PANEL: CPT

## 2018-09-17 PROCEDURE — 85025 COMPLETE CBC W/AUTO DIFF WBC: CPT

## 2018-09-17 PROCEDURE — 85651 RBC SED RATE NONAUTOMATED: CPT

## 2018-09-17 PROCEDURE — 86140 C-REACTIVE PROTEIN: CPT

## 2018-09-20 ENCOUNTER — HOSPITAL ENCOUNTER (OUTPATIENT)
Dept: WOUND CARE | Age: 38
Discharge: HOME | End: 2018-09-20
Payer: MEDICARE

## 2018-09-20 ENCOUNTER — HOSPITAL ENCOUNTER (OUTPATIENT)
Age: 38
Discharge: HOME | End: 2018-09-22
Payer: MEDICARE

## 2018-09-20 VITALS — SYSTOLIC BLOOD PRESSURE: 122 MMHG | TEMPERATURE: 98.8 F | DIASTOLIC BLOOD PRESSURE: 72 MMHG | RESPIRATION RATE: 20 BRPM

## 2018-09-20 DIAGNOSIS — L98.422 SKIN ULCER OF BACK WITH FAT LAYER EXPOSED (HCC): ICD-10-CM

## 2018-09-20 DIAGNOSIS — M46.28 OSTEOMYELITIS OF SACRUM (HCC): ICD-10-CM

## 2018-09-20 LAB
ALBUMIN SERPL-MCNC: 3.7 G/DL (ref 3.5–5.2)
ALP BLD-CCNC: 84 U/L (ref 35–104)
ALT SERPL-CCNC: <5 U/L (ref 0–32)
ANION GAP SERPL CALCULATED.3IONS-SCNC: 16 MMOL/L (ref 7–16)
AST SERPL-CCNC: 12 U/L (ref 0–31)
BASOPHILS ABSOLUTE: 0.03 E9/L (ref 0–0.2)
BASOPHILS RELATIVE PERCENT: 0.5 % (ref 0–2)
BILIRUB SERPL-MCNC: <0.2 MG/DL (ref 0–1.2)
BUN BLDV-MCNC: 13 MG/DL (ref 6–20)
CALCIUM SERPL-MCNC: 8.6 MG/DL (ref 8.6–10.2)
CHLORIDE BLD-SCNC: 101 MMOL/L (ref 98–107)
CO2: 19 MMOL/L (ref 22–29)
CREAT SERPL-MCNC: 0.4 MG/DL (ref 0.5–1)
EOSINOPHILS ABSOLUTE: 0.3 E9/L (ref 0.05–0.5)
EOSINOPHILS RELATIVE PERCENT: 4.6 % (ref 0–6)
GFR AFRICAN AMERICAN: >60
GFR NON-AFRICAN AMERICAN: >60 ML/MIN/1.73
GLUCOSE BLD-MCNC: 155 MG/DL (ref 74–109)
HCT VFR BLD CALC: 35.3 % (ref 34–48)
HEMOGLOBIN: 10.4 G/DL (ref 11.5–15.5)
IMMATURE GRANULOCYTES #: 0.02 E9/L
IMMATURE GRANULOCYTES %: 0.3 % (ref 0–5)
LYMPHOCYTES ABSOLUTE: 1.04 E9/L (ref 1.5–4)
LYMPHOCYTES RELATIVE PERCENT: 16 % (ref 20–42)
MCH RBC QN AUTO: 24.1 PG (ref 26–35)
MCHC RBC AUTO-ENTMCNC: 29.5 % (ref 32–34.5)
MCV RBC AUTO: 81.9 FL (ref 80–99.9)
MONOCYTES ABSOLUTE: 0.34 E9/L (ref 0.1–0.95)
MONOCYTES RELATIVE PERCENT: 5.2 % (ref 2–12)
NEUTROPHILS ABSOLUTE: 4.77 E9/L (ref 1.8–7.3)
NEUTROPHILS RELATIVE PERCENT: 73.4 % (ref 43–80)
PDW BLD-RTO: 17.7 FL (ref 11.5–15)
PLATELET # BLD: 286 E9/L (ref 130–450)
PMV BLD AUTO: 10.7 FL (ref 7–12)
POTASSIUM SERPL-SCNC: 4 MMOL/L (ref 3.5–5)
RBC # BLD: 4.31 E12/L (ref 3.5–5.5)
SODIUM BLD-SCNC: 136 MMOL/L (ref 132–146)
TOTAL PROTEIN: 7 G/DL (ref 6.4–8.3)
WBC # BLD: 6.5 E9/L (ref 4.5–11.5)

## 2018-09-20 PROCEDURE — 85025 COMPLETE CBC W/AUTO DIFF WBC: CPT

## 2018-09-20 PROCEDURE — 80053 COMPREHEN METABOLIC PANEL: CPT

## 2018-09-20 PROCEDURE — 99213 OFFICE O/P EST LOW 20 MIN: CPT

## 2018-09-20 NOTE — PROGRESS NOTES
WOUND CARE NOTE          Maryam AQUILINO Eliobrandon  AGE:  34 y.o. GENDER: female    : 1980  TODAY'S DATE:  2018  Margaret Martinez,     Subjective:    Has chief complaint: sacral/pressure wound   Darcy Rodrigues is a 34 y.o. female who presents today for wound check. Patient has  has a past medical history of Asthma; Blindness; HLD (hyperlipidemia); HTN (hypertension); and Spina bifida ([address] .   Pt has  a sacral wound . The patient is tolerating antibiotics/ANCEF. PT HAD FEVERS LAST WEEK. WORK UP CAME BACK NEG BLOOD CX  Denies fevers, chill or diarrhea. No nausea or vomiting. Has no pain. Per mother wounds appear better    Recent MRI at [facility].  FINDINGS CONSISTENT WITH OSTEOMYELITIS OF THE RESIDUAL SACROCOCCYGEAL   ELEMENTS UNDERLYING THE POSTERIOR, DEEP SOFT TISSUE WOUND. Medications:  Current Outpatient Prescriptions on File Prior to Encounter   Medication Sig Dispense Refill    ceFAZolin (ANCEF) infusion Infuse 2,000 mg intravenously every 8 hours Compound per protocol 252 g 0    vitamin C (ASCORBIC ACID) 500 MG tablet Take 500 mg by mouth daily      naproxen (EC NAPROSYN) 500 MG EC tablet Take 500 mg by mouth as needed for Pain      enalapril (VASOTEC) 5 MG tablet Take 2.5 mg by mouth daily       simvastatin (ZOCOR) 10 MG tablet Take 10 mg by mouth nightly      Cholecalciferol (VITAMIN D3) 2000 units CAPS Take 1 capsule by mouth nightly       venlafaxine (EFFEXOR) 75 MG tablet Take 75 mg by mouth nightly      ibuprofen (ADVIL) 200 MG CAPS Take 2 capsules by mouth daily as needed for Pain       acetaminophen (TYLENOL) 325 MG tablet Take 650 mg by mouth 3 times daily as needed for Pain       citalopram (CELEXA) 40 MG tablet Take 40 mg by mouth nightly       collagenase (SANTYL) 250 UNIT/GM ointment Apply topically daily.  90 g 1    albuterol sulfate HFA (PROAIR HFA) 108 (90 Base) MCG/ACT inhaler Inhale 2 puffs into the lungs every 6 hours as needed for Wheezing or Shortness of

## 2018-09-24 ENCOUNTER — HOSPITAL ENCOUNTER (OUTPATIENT)
Age: 38
Discharge: HOME | End: 2018-09-26
Payer: MEDICARE

## 2018-09-24 LAB
BASOPHILS ABSOLUTE: 0.02 E9/L (ref 0–0.2)
BASOPHILS RELATIVE PERCENT: 0.4 % (ref 0–2)
C-REACTIVE PROTEIN: 1.8 MG/DL (ref 0–0.4)
EOSINOPHILS ABSOLUTE: 0.26 E9/L (ref 0.05–0.5)
EOSINOPHILS RELATIVE PERCENT: 5.8 % (ref 0–6)
HCT VFR BLD CALC: 34.3 % (ref 34–48)
HEMOGLOBIN: 10.3 G/DL (ref 11.5–15.5)
IMMATURE GRANULOCYTES #: 0.01 E9/L
IMMATURE GRANULOCYTES %: 0.2 % (ref 0–5)
LYMPHOCYTES ABSOLUTE: 0.84 E9/L (ref 1.5–4)
LYMPHOCYTES RELATIVE PERCENT: 18.6 % (ref 20–42)
MCH RBC QN AUTO: 24.3 PG (ref 26–35)
MCHC RBC AUTO-ENTMCNC: 30 % (ref 32–34.5)
MCV RBC AUTO: 81.1 FL (ref 80–99.9)
MONOCYTES ABSOLUTE: 0.23 E9/L (ref 0.1–0.95)
MONOCYTES RELATIVE PERCENT: 5.1 % (ref 2–12)
NEUTROPHILS ABSOLUTE: 3.15 E9/L (ref 1.8–7.3)
NEUTROPHILS RELATIVE PERCENT: 69.9 % (ref 43–80)
PDW BLD-RTO: 17.5 FL (ref 11.5–15)
PLATELET # BLD: 255 E9/L (ref 130–450)
PMV BLD AUTO: 10.6 FL (ref 7–12)
RBC # BLD: 4.23 E12/L (ref 3.5–5.5)
WBC # BLD: 4.5 E9/L (ref 4.5–11.5)

## 2018-09-24 PROCEDURE — 85025 COMPLETE CBC W/AUTO DIFF WBC: CPT

## 2018-09-24 PROCEDURE — 36415 COLL VENOUS BLD VENIPUNCTURE: CPT

## 2018-09-24 PROCEDURE — 86140 C-REACTIVE PROTEIN: CPT

## 2018-09-24 PROCEDURE — 85651 RBC SED RATE NONAUTOMATED: CPT

## 2018-09-24 PROCEDURE — 80053 COMPREHEN METABOLIC PANEL: CPT

## 2018-09-25 LAB
ALBUMIN SERPL-MCNC: 3.5 G/DL (ref 3.5–5.2)
ALP BLD-CCNC: 75 U/L (ref 35–104)
ALT SERPL-CCNC: <5 U/L (ref 0–32)
ANION GAP SERPL CALCULATED.3IONS-SCNC: 20 MMOL/L (ref 7–16)
AST SERPL-CCNC: 14 U/L (ref 0–31)
BILIRUB SERPL-MCNC: <0.2 MG/DL (ref 0–1.2)
BUN BLDV-MCNC: 7 MG/DL (ref 6–20)
CALCIUM SERPL-MCNC: 8.4 MG/DL (ref 8.6–10.2)
CHLORIDE BLD-SCNC: 101 MMOL/L (ref 98–107)
CO2: 19 MMOL/L (ref 22–29)
CREAT SERPL-MCNC: 0.4 MG/DL (ref 0.5–1)
GFR AFRICAN AMERICAN: >60
GFR NON-AFRICAN AMERICAN: >60 ML/MIN/1.73
GLUCOSE BLD-MCNC: 154 MG/DL (ref 74–109)
POTASSIUM SERPL-SCNC: 3.7 MMOL/L (ref 3.5–5)
SEDIMENTATION RATE, ERYTHROCYTE: 52 MM/HR (ref 0–20)
SODIUM BLD-SCNC: 140 MMOL/L (ref 132–146)
TOTAL PROTEIN: 6.9 G/DL (ref 6.4–8.3)

## 2018-09-27 ENCOUNTER — HOSPITAL ENCOUNTER (OUTPATIENT)
Age: 38
Discharge: HOME | End: 2018-09-29
Payer: MEDICARE

## 2018-09-27 LAB
ALBUMIN SERPL-MCNC: 3.9 G/DL (ref 3.5–5.2)
ALP BLD-CCNC: 89 U/L (ref 35–104)
ALT SERPL-CCNC: <5 U/L (ref 0–32)
ANION GAP SERPL CALCULATED.3IONS-SCNC: 14 MMOL/L (ref 7–16)
ANISOCYTOSIS: ABNORMAL
AST SERPL-CCNC: 13 U/L (ref 0–31)
BASOPHILS ABSOLUTE: 0.04 E9/L (ref 0–0.2)
BASOPHILS RELATIVE PERCENT: 0.7 % (ref 0–2)
BILIRUB SERPL-MCNC: 0.3 MG/DL (ref 0–1.2)
BUN BLDV-MCNC: 9 MG/DL (ref 6–20)
BURR CELLS: ABNORMAL
CALCIUM SERPL-MCNC: 9 MG/DL (ref 8.6–10.2)
CHLORIDE BLD-SCNC: 104 MMOL/L (ref 98–107)
CO2: 21 MMOL/L (ref 22–29)
CREAT SERPL-MCNC: 0.4 MG/DL (ref 0.5–1)
EOSINOPHILS ABSOLUTE: 0.31 E9/L (ref 0.05–0.5)
EOSINOPHILS RELATIVE PERCENT: 5.1 % (ref 0–6)
GFR AFRICAN AMERICAN: >60
GFR NON-AFRICAN AMERICAN: >60 ML/MIN/1.73
GLUCOSE BLD-MCNC: 81 MG/DL (ref 74–109)
HCT VFR BLD CALC: 41.2 % (ref 34–48)
HEMOGLOBIN: 12.5 G/DL (ref 11.5–15.5)
IMMATURE GRANULOCYTES #: 0.01 E9/L
IMMATURE GRANULOCYTES %: 0.2 % (ref 0–5)
LYMPHOCYTES ABSOLUTE: 1.12 E9/L (ref 1.5–4)
LYMPHOCYTES RELATIVE PERCENT: 18.4 % (ref 20–42)
MCH RBC QN AUTO: 24.5 PG (ref 26–35)
MCHC RBC AUTO-ENTMCNC: 30.3 % (ref 32–34.5)
MCV RBC AUTO: 80.8 FL (ref 80–99.9)
MONOCYTES ABSOLUTE: 0.35 E9/L (ref 0.1–0.95)
MONOCYTES RELATIVE PERCENT: 5.8 % (ref 2–12)
NEUTROPHILS ABSOLUTE: 4.25 E9/L (ref 1.8–7.3)
NEUTROPHILS RELATIVE PERCENT: 69.8 % (ref 43–80)
PDW BLD-RTO: 17.1 FL (ref 11.5–15)
PLATELET # BLD: 294 E9/L (ref 130–450)
PMV BLD AUTO: 11.4 FL (ref 7–12)
POIKILOCYTES: ABNORMAL
POLYCHROMASIA: ABNORMAL
POTASSIUM SERPL-SCNC: 3.9 MMOL/L (ref 3.5–5)
RBC # BLD: 5.1 E12/L (ref 3.5–5.5)
SODIUM BLD-SCNC: 139 MMOL/L (ref 132–146)
TOTAL PROTEIN: 8.3 G/DL (ref 6.4–8.3)
WBC # BLD: 6.1 E9/L (ref 4.5–11.5)

## 2018-09-27 PROCEDURE — 80053 COMPREHEN METABOLIC PANEL: CPT

## 2018-09-27 PROCEDURE — 85025 COMPLETE CBC W/AUTO DIFF WBC: CPT

## 2018-09-28 ENCOUNTER — HOSPITAL ENCOUNTER (OUTPATIENT)
Dept: WOUND CARE | Age: 38
Discharge: HOME | End: 2018-09-28
Payer: MEDICARE

## 2018-09-28 DIAGNOSIS — M46.28 OSTEOMYELITIS OF SACRUM (HCC): ICD-10-CM

## 2018-09-28 PROCEDURE — 11042 DBRDMT SUBQ TIS 1ST 20SQCM/<: CPT

## 2018-09-28 PROCEDURE — 11042 DBRDMT SUBQ TIS 1ST 20SQCM/<: CPT | Performed by: FAMILY MEDICINE

## 2018-09-28 NOTE — PROGRESS NOTES
(Active)   Number of days: 1453     Wound   serous exudate noted  Errythema - Present, but less.    (this wound was originally measured on:)            Measurements shown are from today's visit. Assessment:     Please refer to nursing measurements and assessment regarding wound size pre and post debridement. Wound check - Care provided includes removal of existing dressing and visual inspection    Procedure: Debridement Note: Wound # 15. At 15 cm sq. The patient was placed in the prone position. Lidocaine  gauze was applied  at beginning of wound evaluation. An  Excisional Debridement was performed. Using a curette and tissue nippers ,  the wound was debrided sharply of all fibrotic, necrotic, and hyperkeratotic tissue, including a layer of surrounding healthy tissue to stimulate epithelization. The wound was excised through the level of the subcutaneous Wound Percentage debrided is 100%. Please refer to Nurses notes for pre and post debridement dimensions. Wound was irrigated with normal saline solution. Bleeding was with a small amount of bleeding, and controlled with pressure. Patient tolerated procedure well and was given proper instruction. Diagnosis: pressure ulcer: Stage IV                      Patient Active Problem List   Diagnosis Code    Decubitus skin ulcer. 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 Alginate. 3. Discussed appropriate home care of this wound. 4. Patient instructions were given. 5. Follow Up in 1 week(s).                                      Cornelio Ray DO                           9/28/18     10:46 AM

## 2018-10-01 ENCOUNTER — HOSPITAL ENCOUNTER (OUTPATIENT)
Age: 38
Discharge: HOME | End: 2018-10-03
Payer: MEDICARE

## 2018-10-01 LAB
ALBUMIN SERPL-MCNC: 3.6 G/DL (ref 3.5–5.2)
ALP BLD-CCNC: 81 U/L (ref 35–104)
ALT SERPL-CCNC: <5 U/L (ref 0–32)
ANION GAP SERPL CALCULATED.3IONS-SCNC: 18 MMOL/L (ref 7–16)
AST SERPL-CCNC: 19 U/L (ref 0–31)
BASOPHILS ABSOLUTE: 0.03 E9/L (ref 0–0.2)
BASOPHILS RELATIVE PERCENT: 0.6 % (ref 0–2)
BILIRUB SERPL-MCNC: <0.2 MG/DL (ref 0–1.2)
BUN BLDV-MCNC: 8 MG/DL (ref 6–20)
C-REACTIVE PROTEIN: 1.6 MG/DL (ref 0–0.4)
CALCIUM SERPL-MCNC: 8.5 MG/DL (ref 8.6–10.2)
CHLORIDE BLD-SCNC: 102 MMOL/L (ref 98–107)
CO2: 19 MMOL/L (ref 22–29)
CREAT SERPL-MCNC: 0.4 MG/DL (ref 0.5–1)
EOSINOPHILS ABSOLUTE: 0.31 E9/L (ref 0.05–0.5)
EOSINOPHILS RELATIVE PERCENT: 6.4 % (ref 0–6)
GFR AFRICAN AMERICAN: >60
GFR NON-AFRICAN AMERICAN: >60 ML/MIN/1.73
GLUCOSE BLD-MCNC: 80 MG/DL (ref 74–109)
HCT VFR BLD CALC: 38.8 % (ref 34–48)
HEMOGLOBIN: 11.4 G/DL (ref 11.5–15.5)
IMMATURE GRANULOCYTES #: 0.01 E9/L
IMMATURE GRANULOCYTES %: 0.2 % (ref 0–5)
LYMPHOCYTES ABSOLUTE: 1.11 E9/L (ref 1.5–4)
LYMPHOCYTES RELATIVE PERCENT: 22.9 % (ref 20–42)
MCH RBC QN AUTO: 24.5 PG (ref 26–35)
MCHC RBC AUTO-ENTMCNC: 29.4 % (ref 32–34.5)
MCV RBC AUTO: 83.4 FL (ref 80–99.9)
MONOCYTES ABSOLUTE: 0.27 E9/L (ref 0.1–0.95)
MONOCYTES RELATIVE PERCENT: 5.6 % (ref 2–12)
NEUTROPHILS ABSOLUTE: 3.11 E9/L (ref 1.8–7.3)
NEUTROPHILS RELATIVE PERCENT: 64.3 % (ref 43–80)
PDW BLD-RTO: 17.2 FL (ref 11.5–15)
PLATELET # BLD: 294 E9/L (ref 130–450)
PMV BLD AUTO: 10.1 FL (ref 7–12)
POTASSIUM SERPL-SCNC: 4.4 MMOL/L (ref 3.5–5)
RBC # BLD: 4.65 E12/L (ref 3.5–5.5)
SODIUM BLD-SCNC: 139 MMOL/L (ref 132–146)
TOTAL PROTEIN: 7.4 G/DL (ref 6.4–8.3)
WBC # BLD: 4.8 E9/L (ref 4.5–11.5)

## 2018-10-01 PROCEDURE — 85025 COMPLETE CBC W/AUTO DIFF WBC: CPT

## 2018-10-01 PROCEDURE — 86140 C-REACTIVE PROTEIN: CPT

## 2018-10-01 PROCEDURE — 36415 COLL VENOUS BLD VENIPUNCTURE: CPT

## 2018-10-01 PROCEDURE — 80053 COMPREHEN METABOLIC PANEL: CPT

## 2018-10-01 PROCEDURE — 85651 RBC SED RATE NONAUTOMATED: CPT

## 2018-10-02 LAB — SEDIMENTATION RATE, ERYTHROCYTE: 40 MM/HR (ref 0–20)

## 2018-10-04 ENCOUNTER — HOSPITAL ENCOUNTER (OUTPATIENT)
Dept: WOUND CARE | Age: 38
Discharge: HOME | End: 2018-10-04
Payer: MEDICARE

## 2018-10-04 ENCOUNTER — HOSPITAL ENCOUNTER (OUTPATIENT)
Age: 38
Discharge: HOME | End: 2018-10-06
Payer: MEDICARE

## 2018-10-04 ENCOUNTER — HOSPITAL ENCOUNTER (OUTPATIENT)
Dept: INFUSION THERAPY | Age: 38
Setting detail: INFUSION SERIES
Discharge: HOME | End: 2018-10-04
Payer: MEDICARE

## 2018-10-04 VITALS
SYSTOLIC BLOOD PRESSURE: 100 MMHG | WEIGHT: 183 LBS | BODY MASS INDEX: 42.35 KG/M2 | DIASTOLIC BLOOD PRESSURE: 60 MMHG | HEIGHT: 55 IN | HEART RATE: 86 BPM | RESPIRATION RATE: 20 BRPM | TEMPERATURE: 99.1 F

## 2018-10-04 DIAGNOSIS — M46.28 OSTEOMYELITIS OF SACRUM (HCC): ICD-10-CM

## 2018-10-04 LAB
ALBUMIN SERPL-MCNC: 3.9 G/DL (ref 3.5–5.2)
ALP BLD-CCNC: 78 U/L (ref 35–104)
ALT SERPL-CCNC: <5 U/L (ref 0–32)
ANION GAP SERPL CALCULATED.3IONS-SCNC: 18 MMOL/L (ref 7–16)
AST SERPL-CCNC: 17 U/L (ref 0–31)
BASOPHILS ABSOLUTE: 0.04 E9/L (ref 0–0.2)
BASOPHILS RELATIVE PERCENT: 0.7 % (ref 0–2)
BILIRUB SERPL-MCNC: <0.2 MG/DL (ref 0–1.2)
BUN BLDV-MCNC: 7 MG/DL (ref 6–20)
CALCIUM SERPL-MCNC: 8.9 MG/DL (ref 8.6–10.2)
CHLORIDE BLD-SCNC: 100 MMOL/L (ref 98–107)
CO2: 20 MMOL/L (ref 22–29)
CREAT SERPL-MCNC: 0.4 MG/DL (ref 0.5–1)
EOSINOPHILS ABSOLUTE: 0.37 E9/L (ref 0.05–0.5)
EOSINOPHILS RELATIVE PERCENT: 6.4 % (ref 0–6)
GFR AFRICAN AMERICAN: >60
GFR NON-AFRICAN AMERICAN: >60 ML/MIN/1.73
GLUCOSE BLD-MCNC: 114 MG/DL (ref 74–109)
HCT VFR BLD CALC: 37.8 % (ref 34–48)
HEMOGLOBIN: 11.4 G/DL (ref 11.5–15.5)
IMMATURE GRANULOCYTES #: 0.01 E9/L
IMMATURE GRANULOCYTES %: 0.2 % (ref 0–5)
LYMPHOCYTES ABSOLUTE: 0.91 E9/L (ref 1.5–4)
LYMPHOCYTES RELATIVE PERCENT: 15.6 % (ref 20–42)
MCH RBC QN AUTO: 24.7 PG (ref 26–35)
MCHC RBC AUTO-ENTMCNC: 30.2 % (ref 32–34.5)
MCV RBC AUTO: 82 FL (ref 80–99.9)
MONOCYTES ABSOLUTE: 0.28 E9/L (ref 0.1–0.95)
MONOCYTES RELATIVE PERCENT: 4.8 % (ref 2–12)
NEUTROPHILS ABSOLUTE: 4.21 E9/L (ref 1.8–7.3)
NEUTROPHILS RELATIVE PERCENT: 72.3 % (ref 43–80)
PDW BLD-RTO: 17.2 FL (ref 11.5–15)
PLATELET # BLD: 279 E9/L (ref 130–450)
PMV BLD AUTO: 10.3 FL (ref 7–12)
POTASSIUM SERPL-SCNC: 4.2 MMOL/L (ref 3.5–5)
RBC # BLD: 4.61 E12/L (ref 3.5–5.5)
SODIUM BLD-SCNC: 138 MMOL/L (ref 132–146)
TOTAL PROTEIN: 7.5 G/DL (ref 6.4–8.3)
WBC # BLD: 5.8 E9/L (ref 4.5–11.5)

## 2018-10-04 PROCEDURE — 85025 COMPLETE CBC W/AUTO DIFF WBC: CPT

## 2018-10-04 PROCEDURE — 2580000003 HC RX 258: Performed by: SPECIALIST

## 2018-10-04 PROCEDURE — 99213 OFFICE O/P EST LOW 20 MIN: CPT

## 2018-10-04 PROCEDURE — 36592 COLLECT BLOOD FROM PICC: CPT

## 2018-10-04 PROCEDURE — 36593 DECLOT VASCULAR DEVICE: CPT

## 2018-10-04 PROCEDURE — 80053 COMPREHEN METABOLIC PANEL: CPT

## 2018-10-04 PROCEDURE — 6360000002 HC RX W HCPCS: Performed by: SPECIALIST

## 2018-10-04 RX ORDER — METHYLPREDNISOLONE SODIUM SUCCINATE 125 MG/2ML
125 INJECTION, POWDER, LYOPHILIZED, FOR SOLUTION INTRAMUSCULAR; INTRAVENOUS ONCE
Status: CANCELLED | OUTPATIENT
Start: 2018-10-04 | End: 2018-10-04

## 2018-10-04 RX ORDER — HEPARIN SODIUM (PORCINE) LOCK FLUSH IV SOLN 100 UNIT/ML 100 UNIT/ML
500 SOLUTION INTRAVENOUS PRN
Status: DISCONTINUED | OUTPATIENT
Start: 2018-10-04 | End: 2018-10-05 | Stop reason: HOSPADM

## 2018-10-04 RX ORDER — CEPHALEXIN 500 MG/1
500 CAPSULE ORAL 4 TIMES DAILY
Qty: 120 CAPSULE | Refills: 0 | Status: SHIPPED | OUTPATIENT
Start: 2018-10-04 | End: 2018-11-03

## 2018-10-04 RX ORDER — SODIUM CHLORIDE 9 MG/ML
100 INJECTION, SOLUTION INTRAVENOUS CONTINUOUS
Status: CANCELLED | OUTPATIENT
Start: 2018-10-04

## 2018-10-04 RX ORDER — SODIUM CHLORIDE 0.9 % (FLUSH) 0.9 %
5 SYRINGE (ML) INJECTION PRN
Status: CANCELLED | OUTPATIENT
Start: 2018-10-04

## 2018-10-04 RX ORDER — EPINEPHRINE 1 MG/ML
0.3 INJECTION, SOLUTION, CONCENTRATE INTRAVENOUS PRN
Status: CANCELLED | OUTPATIENT
Start: 2018-10-04

## 2018-10-04 RX ORDER — DIPHENHYDRAMINE HYDROCHLORIDE 50 MG/ML
50 INJECTION INTRAMUSCULAR; INTRAVENOUS ONCE
Status: CANCELLED | OUTPATIENT
Start: 2018-10-04 | End: 2018-10-04

## 2018-10-04 RX ORDER — SODIUM CHLORIDE 0.9 % (FLUSH) 0.9 %
10 SYRINGE (ML) INJECTION PRN
Status: CANCELLED | OUTPATIENT
Start: 2018-10-04

## 2018-10-04 RX ORDER — SODIUM CHLORIDE 0.9 % (FLUSH) 0.9 %
10 SYRINGE (ML) INJECTION ONCE
Status: CANCELLED | OUTPATIENT
Start: 2018-10-04 | End: 2018-10-04

## 2018-10-04 RX ORDER — SODIUM CHLORIDE 0.9 % (FLUSH) 0.9 %
10 SYRINGE (ML) INJECTION PRN
Status: DISCONTINUED | OUTPATIENT
Start: 2018-10-04 | End: 2018-10-05 | Stop reason: HOSPADM

## 2018-10-04 RX ORDER — HEPARIN SODIUM (PORCINE) LOCK FLUSH IV SOLN 100 UNIT/ML 100 UNIT/ML
500 SOLUTION INTRAVENOUS PRN
Status: CANCELLED | OUTPATIENT
Start: 2018-10-04

## 2018-10-04 RX ADMIN — Medication 500 UNITS: at 15:22

## 2018-10-04 RX ADMIN — Medication 10 ML: at 14:30

## 2018-10-04 RX ADMIN — WATER 10 ML: 1 INJECTION, SOLUTION INTRAVENOUS at 14:42

## 2018-10-04 RX ADMIN — ALTEPLASE 2 MG: 2.2 INJECTION, POWDER, LYOPHILIZED, FOR SOLUTION INTRAVENOUS at 14:42

## 2018-10-04 RX ADMIN — Medication 10 ML: at 15:22

## 2018-10-04 NOTE — PROGRESS NOTES
medications on file prior to encounter. Allergy: Latex; Iv [iodides]; Penicillins; Shellfish-derived products; Hydrogen peroxide; Adhesive tape; and Bactrim [sulfamethoxazole-trimethoprim]  MEDICAL HISTORY  Past Medical History:   Diagnosis Date    Asthma     Blindness 12/2015    HLD (hyperlipidemia)     HTN (hypertension)     Spina [address]ida ([facility] [address]       Past Surgical History:   Procedure Laterality Date    CERVIX SURGERY      CSF SHUNT      OTHER SURGICAL HISTORY  unsure    back x 3    OTHER SURGICAL HISTORY  unsure    Right leg x 3    OTHER SURGICAL HISTORY  unsure    left leg x 3    OTHER SURGICAL HISTORY  as child    eye surgery    OTHER SURGICAL HISTORY  \"may\"    shunts in head    OTHER SURGICAL HISTORY  2004    \"ostomy\"    OTHER SURGICAL HISTORY  2004    \"perez stoma\"    PATENT DUCTUS ARTERIOUS LIGATION  1980     FAMILY HISTORY  family history includes High Blood Pressure in her brother and father.   SOCIAL HISTORY  Social History   Substance Use Topics    Smoking status: Never Smoker    Smokeless tobacco: Never Used    Alcohol use No       Objective:      /60   Pulse 86   Temp 99.1 °F (37.3 °C)   Resp 20   Ht 4' 3\" (1.295 m)   Wt 183 lb (83 kg)   BMI 49.47 kg/m²     PE:  Awake, alert, NAD  HEENT: Atraumatic, normocephalic, pupils reactive, no thrush  Abd: bowel sounds present, nontender, nondistended, no masses  Extrem:  No clubbing, cyanosis, or edema  CNS: no focal deficits  SKIN: no rash spina bifida  picc LUE    Wound Care Documentation:  Pressure Ulcer 10/20/14 Coccyx #12 stage II pressure ulcer coccyx aquired 1-18-14 (Active)   Number of days: 1445       Pressure Ulcer 11/02/17 Coccyx Mid #15 blocked stage II coccyx ulcer acq: 10-22-17 (Active)   Kiera-wound Assessment Intact 9/28/2018  8:25 AM   Kiera-Wound Moisture Intact 8/23/2018  3:53 PM   Pressure Ulcer Staging Stage II 11/2/2017  8:43 AM   Wound Assessment Pink;Red 9/28/2018  8:25 AM   Wound Length (cm) 5 cm

## 2018-10-08 ENCOUNTER — HOSPITAL ENCOUNTER (OUTPATIENT)
Age: 38
Discharge: HOME | End: 2018-10-10
Payer: MEDICARE

## 2018-10-08 LAB
ALBUMIN SERPL-MCNC: 3.8 G/DL (ref 3.5–5.2)
ALP BLD-CCNC: 82 U/L (ref 35–104)
ALT SERPL-CCNC: <5 U/L (ref 0–32)
ANION GAP SERPL CALCULATED.3IONS-SCNC: 22 MMOL/L (ref 7–16)
AST SERPL-CCNC: 10 U/L (ref 0–31)
BASOPHILS ABSOLUTE: 0.04 E9/L (ref 0–0.2)
BASOPHILS RELATIVE PERCENT: 0.5 % (ref 0–2)
BILIRUB SERPL-MCNC: <0.2 MG/DL (ref 0–1.2)
BUN BLDV-MCNC: 13 MG/DL (ref 6–20)
C-REACTIVE PROTEIN: 1.4 MG/DL (ref 0–0.4)
CALCIUM SERPL-MCNC: 8.6 MG/DL (ref 8.6–10.2)
CHLORIDE BLD-SCNC: 101 MMOL/L (ref 98–107)
CO2: 18 MMOL/L (ref 22–29)
CREAT SERPL-MCNC: 0.4 MG/DL (ref 0.5–1)
EOSINOPHILS ABSOLUTE: 0.45 E9/L (ref 0.05–0.5)
EOSINOPHILS RELATIVE PERCENT: 5.9 % (ref 0–6)
GFR AFRICAN AMERICAN: >60
GFR NON-AFRICAN AMERICAN: >60 ML/MIN/1.73
GLUCOSE BLD-MCNC: 75 MG/DL (ref 74–109)
HCT VFR BLD CALC: 37.1 % (ref 34–48)
HEMOGLOBIN: 11.1 G/DL (ref 11.5–15.5)
IMMATURE GRANULOCYTES #: 0.03 E9/L
IMMATURE GRANULOCYTES %: 0.4 % (ref 0–5)
LYMPHOCYTES ABSOLUTE: 1.22 E9/L (ref 1.5–4)
LYMPHOCYTES RELATIVE PERCENT: 16.1 % (ref 20–42)
MCH RBC QN AUTO: 24.7 PG (ref 26–35)
MCHC RBC AUTO-ENTMCNC: 29.9 % (ref 32–34.5)
MCV RBC AUTO: 82.4 FL (ref 80–99.9)
MONOCYTES ABSOLUTE: 0.41 E9/L (ref 0.1–0.95)
MONOCYTES RELATIVE PERCENT: 5.4 % (ref 2–12)
NEUTROPHILS ABSOLUTE: 5.45 E9/L (ref 1.8–7.3)
NEUTROPHILS RELATIVE PERCENT: 71.7 % (ref 43–80)
PDW BLD-RTO: 16.9 FL (ref 11.5–15)
PLATELET # BLD: 284 E9/L (ref 130–450)
PMV BLD AUTO: 10.4 FL (ref 7–12)
POTASSIUM SERPL-SCNC: 4 MMOL/L (ref 3.5–5)
RBC # BLD: 4.5 E12/L (ref 3.5–5.5)
SEDIMENTATION RATE, ERYTHROCYTE: 41 MM/HR (ref 0–20)
SODIUM BLD-SCNC: 141 MMOL/L (ref 132–146)
TOTAL PROTEIN: 7.6 G/DL (ref 6.4–8.3)
WBC # BLD: 7.6 E9/L (ref 4.5–11.5)

## 2018-10-08 PROCEDURE — 85651 RBC SED RATE NONAUTOMATED: CPT

## 2018-10-08 PROCEDURE — 86140 C-REACTIVE PROTEIN: CPT

## 2018-10-08 PROCEDURE — 80053 COMPREHEN METABOLIC PANEL: CPT

## 2018-10-08 PROCEDURE — 36415 COLL VENOUS BLD VENIPUNCTURE: CPT

## 2018-10-08 PROCEDURE — 85025 COMPLETE CBC W/AUTO DIFF WBC: CPT

## 2018-10-19 ENCOUNTER — HOSPITAL ENCOUNTER (OUTPATIENT)
Dept: WOUND CARE | Age: 38
Discharge: HOME | End: 2018-10-19
Payer: MEDICARE

## 2018-10-19 VITALS
RESPIRATION RATE: 18 BRPM | WEIGHT: 183 LBS | HEART RATE: 74 BPM | TEMPERATURE: 98.6 F | HEIGHT: 55 IN | SYSTOLIC BLOOD PRESSURE: 100 MMHG | BODY MASS INDEX: 42.35 KG/M2 | DIASTOLIC BLOOD PRESSURE: 60 MMHG

## 2018-10-19 DIAGNOSIS — M46.28 OSTEOMYELITIS OF SACRUM (HCC): ICD-10-CM

## 2018-10-19 DIAGNOSIS — L98.422 SKIN ULCER OF BACK WITH FAT LAYER EXPOSED (HCC): ICD-10-CM

## 2018-10-19 PROCEDURE — 11042 DBRDMT SUBQ TIS 1ST 20SQCM/<: CPT

## 2018-10-19 PROCEDURE — 87075 CULTR BACTERIA EXCEPT BLOOD: CPT

## 2018-10-19 PROCEDURE — 87077 CULTURE AEROBIC IDENTIFY: CPT

## 2018-10-19 PROCEDURE — 87186 SC STD MICRODIL/AGAR DIL: CPT

## 2018-10-19 PROCEDURE — 87070 CULTURE OTHR SPECIMN AEROBIC: CPT

## 2018-10-19 PROCEDURE — 11042 DBRDMT SUBQ TIS 1ST 20SQCM/<: CPT | Performed by: FAMILY MEDICINE

## 2018-10-19 NOTE — PROGRESS NOTES
10/19/2018  8:40 AM   Undermining Maxium Distance (cm) 1.2 10/19/2018  8:40 AM   [place]%Wound Bed 20 1/11/2018  8:26 AM   Red%Wound Bed 70 8/3/2018  8:53 AM   Yellow%Wound Bed 30 8/3/2018  8:53 AM   Exposed structure Bone 8/10/2018  9:31 AM   Debridement per physician None 10/4/2018  2:04 PM   Time out Yes 10/19/2018  9:14 AM   Procedural Pain 0 10/19/2018  9:14 AM   Post procedural Pain 0 10/4/2018  2:04 PM   Number of days: 351       Incision 10/06/14 Back Mid (Active)   Number of days: 7559     Wound   serous exudate noted  Errythema - Present, but is decreased. There is some tunelling.    (this wound was originally measured on:)            Measurements shown are from today's visit. Assessment:     Please refer to nursing measurements and assessment regarding wound size pre and post debridement. Wound check - Care provided includes removal of existing dressing and visual inspection    Procedure: Debridement Note: Wound # 15. At 10.12 cm sq. The patient was placed in the prone position. Lidocaine  gauze was applied  at beginning of wound evaluation. An  Excisional Debridement was performed. Using a curette,and scissors and forceps  the wound was debrided sharply of all fibrotic, necrotic, and hyperkeratotic tissue, including a layer of surrounding healthy tissue to stimulate epithelization. The wound was excised through the level of the subcutaneous Wound Percentage debrided is 100%. Please refer to Nurses notes for pre and post debridement dimensions. Wound was irrigated with normal saline solution. Bleeding was with a moderate amount of bleeding, and controlled with pressure. Patient tolerated procedure well and was given proper instruction. Diagnosis: pressure ulcer: Stage IV                      Patient Active Problem List   Diagnosis Code    Decubitus skin ulcer.  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    Spina bifida ([facility] [address] Q05.9    Syncope R55    Hydrocephalus G91.9    Paraplegic

## 2018-10-21 LAB — ANAEROBIC CULTURE: NORMAL

## 2018-10-23 LAB
ORGANISM: ABNORMAL
WOUND/ABSCESS: ABNORMAL

## 2018-10-26 ENCOUNTER — HOSPITAL ENCOUNTER (OUTPATIENT)
Dept: WOUND CARE | Age: 38
Discharge: HOME | End: 2018-10-26
Payer: MEDICARE

## 2018-10-26 VITALS
BODY MASS INDEX: 42.35 KG/M2 | SYSTOLIC BLOOD PRESSURE: 112 MMHG | HEIGHT: 55 IN | TEMPERATURE: 99 F | DIASTOLIC BLOOD PRESSURE: 64 MMHG | HEART RATE: 88 BPM | RESPIRATION RATE: 20 BRPM | WEIGHT: 183 LBS

## 2018-10-26 PROCEDURE — 11042 DBRDMT SUBQ TIS 1ST 20SQCM/<: CPT

## 2018-10-26 PROCEDURE — 11042 DBRDMT SUBQ TIS 1ST 20SQCM/<: CPT | Performed by: FAMILY MEDICINE

## 2018-10-26 NOTE — PROGRESS NOTES
Follow-Up Wound Progress Note  Maryam Casper  AGE: 34 y.o. GENDER: female  DOD: 1980  TODAY'S DATE:  10/26/18  Subjective:    Jovanni Chavez is a 34 y.o. female who presents today for wound check. Wound Etiology :  pressure ulcer: Stage IV  Wound Location :  midline and sacral and coccyx Pain : {0/10     Abx : Present    C&S shows pseudomonas  Overall Wound Assessment  Wound is has worsened. Size has unchanged    Objective:    /64   Pulse 88   Temp 99 °F (37.2 °C) (Oral)   Resp 20   Ht 4' 3\" (1.295 m)   Wt 183 lb (83 kg)   BMI 49.47 kg/m²   Pressure Ulcer 10/20/14 Coccyx #12 stage II pressure ulcer coccyx aquired 1-18-14 (Active)   Number of days: 1467       Pressure Ulcer 11/02/17 Coccyx Mid #15 blocked stage II coccyx ulcer acq: 10-22-17 (Active)   Kiera-wound Assessment Pink 10/26/2018  8:19 AM   Kiera-Wound Moisture Intact 8/23/2018  3:53 PM   Pressure Ulcer Staging Stage II 11/2/2017  8:43 AM   Wound Assessment Pink;Red 10/26/2018  8:19 AM   Wound Length (cm) 4.6 cm 10/26/2018  8:45 AM   Wound Width (cm) 2 cm 10/26/2018  8:45 AM   Wound Depth (cm)  2.3 10/26/2018  8:45 AM   Calculated Wound Size (cm^2) (l*w) 9.2 cm^2 10/26/2018  8:45 AM   Change in Wound Size % (l*w) 84.89 10/26/2018  8:45 AM   Culture Taken Yes 10/19/2018  9:14 AM   Dressing Status Intact; New drainage 10/26/2018  9:07 AM   Dressing Changed Changed/New 10/26/2018  9:07 AM   Dressing/Treatment Moist to moist 10/26/2018  9:07 AM   Wound Cleansed Rinsed/Irrigated with saline 10/26/2018  9:07 AM   Dressing Change Due 09/29/18 9/28/2018 10:06 AM   Granulation Quality Red 8/23/2018  3:53 PM   Drainage Amount Moderate 10/26/2018  8:19 AM   Drainage Description Serosanguinous 10/26/2018  8:19 AM   Odor None 10/26/2018  8:19 AM   Distance Tunneling (cm) 1.6 cm 6/1/2018  8:57 AM   Tunneling Position ___ O'Clock 0.66 6/1/2018  8:57 AM   Undermining Starts ___ O'Clock 7 10/26/2018  8:19 AM   Undermining Ends___ O'Clock 11

## 2018-11-01 ENCOUNTER — HOSPITAL ENCOUNTER (OUTPATIENT)
Dept: WOUND CARE | Age: 38
Discharge: HOME | End: 2018-11-01
Payer: MEDICARE

## 2018-11-01 VITALS
HEART RATE: 80 BPM | HEIGHT: 55 IN | WEIGHT: 183 LBS | SYSTOLIC BLOOD PRESSURE: 112 MMHG | RESPIRATION RATE: 20 BRPM | DIASTOLIC BLOOD PRESSURE: 80 MMHG | TEMPERATURE: 99 F | BODY MASS INDEX: 42.35 KG/M2

## 2018-11-01 PROCEDURE — 99213 OFFICE O/P EST LOW 20 MIN: CPT

## 2018-11-01 RX ORDER — LEVOFLOXACIN 500 MG/1
500 TABLET, FILM COATED ORAL DAILY
Qty: 30 TABLET | Refills: 0 | Status: SHIPPED | OUTPATIENT
Start: 2018-11-01 | End: 2018-12-01

## 2018-11-01 RX ORDER — LEVOFLOXACIN 500 MG/1
500 TABLET, FILM COATED ORAL DAILY
COMMUNITY
End: 2018-11-09 | Stop reason: SDUPTHER

## 2018-11-09 ENCOUNTER — HOSPITAL ENCOUNTER (OUTPATIENT)
Dept: WOUND CARE | Age: 38
Discharge: HOME | End: 2018-11-09
Payer: MEDICARE

## 2018-11-09 VITALS
WEIGHT: 183 LBS | HEIGHT: 55 IN | RESPIRATION RATE: 16 BRPM | BODY MASS INDEX: 42.35 KG/M2 | TEMPERATURE: 98.5 F | SYSTOLIC BLOOD PRESSURE: 132 MMHG | HEART RATE: 84 BPM | DIASTOLIC BLOOD PRESSURE: 70 MMHG

## 2018-11-09 PROCEDURE — 11042 DBRDMT SUBQ TIS 1ST 20SQCM/<: CPT | Performed by: FAMILY MEDICINE

## 2018-11-09 PROCEDURE — 11042 DBRDMT SUBQ TIS 1ST 20SQCM/<: CPT

## 2018-11-09 NOTE — PROGRESS NOTES
Follow-Up Wound Progress Note  Maryam Casper  AGE: 34 y.o. GENDER: female  DOD: 1980  TODAY'S DATE:  11/9/18  Subjective:    Tyrel Witt is a 34 y.o. female who presents today for wound check. Wound Etiology :  pressure ulcer: Stage IV  Wound Location :  midline and sacral and coccyx Pain : {0/10     Abx : Present      Overall Wound Assessment  Wound is has slightly improved. Size has decreased    Objective:    /70   Pulse 84   Temp 98.5 °F (36.9 °C) (Oral)   Resp 16   Ht 4' 3\" (1.295 m)   Wt 183 lb (83 kg)   BMI 49.47 kg/m²   Pressure Ulcer 10/20/14 Coccyx #12 stage II pressure ulcer coccyx aquired 1-18-14 (Active)   Number of days: 1480       Pressure Ulcer 11/02/17 Coccyx Mid #15 blocked stage II coccyx ulcer acq: 10-22-17 (Active)   Kiera-wound Assessment Pink 11/1/2018  1:09 PM   Kiera-Wound Moisture Intact 8/23/2018  3:53 PM   Pressure Ulcer Staging Stage II 11/2/2017  8:43 AM   Wound Assessment Pink;Red;Yellow 11/9/2018  8:32 AM   Wound Length (cm) 4.5 cm 11/9/2018  9:11 AM   Wound Width (cm) 2.3 cm 11/9/2018  9:11 AM   Wound Depth (cm)  2.4 11/9/2018  9:11 AM   Calculated Wound Size (cm^2) (l*w) 10.35 cm^2 11/9/2018  9:11 AM   Change in Wound Size % (l*w) 83 11/9/2018  9:11 AM   Culture Taken Yes 10/19/2018  9:14 AM   Dressing Status Clean;Dry; Intact 11/1/2018  1:26 PM   Dressing Changed Changed/New 11/1/2018  1:26 PM   Dressing/Treatment ABD; Moist to dry 11/1/2018  1:26 PM   Wound Cleansed Rinsed/Irrigated with saline 11/1/2018  1:26 PM   Dressing Change Due 06/23/18 6/22/2018  9:41 AM   Granulation Quality Red 8/23/2018  3:53 PM   Drainage Amount Large 11/9/2018  8:32 AM   Drainage Description Serosanguinous; Serous 11/9/2018  8:32 AM   Odor None 11/9/2018  8:32 AM   Distance Tunneling (cm) 1.6 cm 6/1/2018  8:57 AM   Tunneling Position ___ O'Clock 0.66 6/1/2018  8:57 AM   Undermining Starts ___ O'Clock 2 11/9/2018  8:32 AM   Undermining Ends___ O'Clock 11 11/9/2018  8:32 AM

## 2018-11-15 ENCOUNTER — HOSPITAL ENCOUNTER (OUTPATIENT)
Dept: WOUND CARE | Age: 38
Discharge: HOME | End: 2018-11-15
Payer: MEDICARE

## 2018-11-15 VITALS
DIASTOLIC BLOOD PRESSURE: 70 MMHG | TEMPERATURE: 98.5 F | RESPIRATION RATE: 16 BRPM | BODY MASS INDEX: 42.35 KG/M2 | HEART RATE: 88 BPM | SYSTOLIC BLOOD PRESSURE: 102 MMHG | HEIGHT: 55 IN | WEIGHT: 183 LBS

## 2018-11-15 PROCEDURE — 99213 OFFICE O/P EST LOW 20 MIN: CPT

## 2018-11-15 NOTE — PROGRESS NOTES
10/08/2018    CREATININE 0.4 10/08/2018    GFRAA >60 10/08/2018    LABGLOM >60 10/08/2018    GLUCOSE 75 10/08/2018    GLUCOSE 96 05/14/2012    PROT 7.6 10/08/2018    LABALBU 3.8 10/08/2018    LABALBU 4.4 05/12/2011    CALCIUM 8.6 10/08/2018    BILITOT <0.2 10/08/2018    ALKPHOS 82 10/08/2018    AST 10 10/08/2018    ALT <5 10/08/2018     Lab Results   Component Value Date    SEDRATE 41 (H) 10/08/2018     Lab Results   Component Value Date    CKTOTAL 71 01/25/2016     MICROBIOLOGY:  Cultures :   Organism   Date Value Ref Range Status   10/19/2018 Corynebacterium species (A)  Final   10/19/2018 Citrobacter freundii (A)  Final   10/19/2018 Corynebacterium species (A)  Final   10/19/2018 Pseudomonas aeruginosa (A)  Final     WOUND/ABSCESS   Date Value Ref Range Status   10/19/2018 Moderate growth  Final   10/19/2018 Light growth  Final   10/19/2018 Moderate growth  (second morphology)    Final   10/19/2018 Light growth  Final     Assessment:     Patient Active Problem List   Diagnosis    Decubitus skin ulcer. Left Ischium bone    Spina bifida ([address]    Syncope    Hydrocephalus    Paraplegic immobility syndrome    Neck pain    Ulcer of left heel (HCC)    Skin ulcer of back with fat layer exposed ([address]    Osteomyelitis of sacrum (HCC)   -chronic nonhealing sacral OM     Plan:   Plan for wound  MRI 1.  FINDINGS CONSISTENT WITH OSTEOMYELITIS OF THE RESIDUAL SACROCOCCYGEAL   ELEMENTS UNDERLYING THE POSTERIOR, DEEP SOFT TISSUE WOUND.  try ancef for 6-8 weeks FROM 8/29-10/10   CONT PO SUPPRESSIVE CEPHALEXIN   ON ACETIC ACID    Dress per physician order   1. Discussed appropriate home care of this wound. 2. Patient instructions were given.   3. Follow up: 4 weeks       Electronically signed by Zuri Alba MD on 11/15/2018 at 3:27 PM

## 2018-11-23 ENCOUNTER — HOSPITAL ENCOUNTER (OUTPATIENT)
Dept: WOUND CARE | Age: 38
Discharge: HOME | End: 2018-11-23
Payer: MEDICARE

## 2018-11-23 VITALS
HEART RATE: 80 BPM | HEIGHT: 55 IN | SYSTOLIC BLOOD PRESSURE: 100 MMHG | DIASTOLIC BLOOD PRESSURE: 50 MMHG | RESPIRATION RATE: 18 BRPM | TEMPERATURE: 98 F | WEIGHT: 183 LBS | BODY MASS INDEX: 42.35 KG/M2

## 2018-11-23 DIAGNOSIS — M46.28 OSTEOMYELITIS OF SACRUM (HCC): ICD-10-CM

## 2018-11-23 DIAGNOSIS — L98.422 SKIN ULCER OF BACK WITH FAT LAYER EXPOSED (HCC): ICD-10-CM

## 2018-11-23 PROCEDURE — 11042 DBRDMT SUBQ TIS 1ST 20SQCM/<: CPT | Performed by: FAMILY MEDICINE

## 2018-11-23 PROCEDURE — 11042 DBRDMT SUBQ TIS 1ST 20SQCM/<: CPT

## 2018-12-07 ENCOUNTER — HOSPITAL ENCOUNTER (OUTPATIENT)
Dept: WOUND CARE | Age: 38
Discharge: HOME | End: 2018-12-07
Payer: MEDICARE

## 2018-12-07 VITALS
RESPIRATION RATE: 14 BRPM | DIASTOLIC BLOOD PRESSURE: 68 MMHG | TEMPERATURE: 98 F | HEART RATE: 78 BPM | SYSTOLIC BLOOD PRESSURE: 100 MMHG

## 2018-12-07 DIAGNOSIS — M46.28 OSTEOMYELITIS OF SACRUM (HCC): ICD-10-CM

## 2018-12-07 PROCEDURE — 87077 CULTURE AEROBIC IDENTIFY: CPT

## 2018-12-07 PROCEDURE — 87070 CULTURE OTHR SPECIMN AEROBIC: CPT

## 2018-12-07 PROCEDURE — 87186 SC STD MICRODIL/AGAR DIL: CPT

## 2018-12-07 PROCEDURE — 11042 DBRDMT SUBQ TIS 1ST 20SQCM/<: CPT

## 2018-12-07 PROCEDURE — 87075 CULTR BACTERIA EXCEPT BLOOD: CPT

## 2018-12-07 PROCEDURE — 11042 DBRDMT SUBQ TIS 1ST 20SQCM/<: CPT | Performed by: FAMILY MEDICINE

## 2018-12-09 LAB — ANAEROBIC CULTURE: NORMAL

## 2018-12-10 LAB
ORGANISM: ABNORMAL
ORGANISM: ABNORMAL
WOUND/ABSCESS: ABNORMAL

## 2018-12-14 ENCOUNTER — HOSPITAL ENCOUNTER (OUTPATIENT)
Dept: WOUND CARE | Age: 38
Discharge: HOME | End: 2018-12-14
Payer: MEDICARE

## 2018-12-14 VITALS
HEART RATE: 72 BPM | OXYGEN SATURATION: 98 % | DIASTOLIC BLOOD PRESSURE: 68 MMHG | SYSTOLIC BLOOD PRESSURE: 110 MMHG | TEMPERATURE: 98 F | RESPIRATION RATE: 14 BRPM

## 2018-12-14 DIAGNOSIS — M46.28 OSTEOMYELITIS OF SACRUM (HCC): ICD-10-CM

## 2018-12-14 PROCEDURE — 11042 DBRDMT SUBQ TIS 1ST 20SQCM/<: CPT

## 2018-12-14 PROCEDURE — 11042 DBRDMT SUBQ TIS 1ST 20SQCM/<: CPT | Performed by: FAMILY MEDICINE

## 2018-12-14 NOTE — PROGRESS NOTES
Follow-Up Wound Progress Note  Maryam Casper  AGE: 34 y.o. GENDER: female  DOD: 1980  TODAY'S DATE:  12/14/18  Subjective:    Clive Simmons is a 34 y.o. female who presents today for wound check. Wound Etiology :  pressure ulcer: Stage IV, Hx osteomyelitis Sacrum  Wound Location :  midline and sacral and coccyx Pain : {1/10     Abx : Absent       Overall Wound Assessment  Wound is has slightly improved. Size has unchanged    Objective:    /68   Pulse 72   Temp 98 °F (36.7 °C) (Oral)   Resp 14   SpO2 98%   Pressure Ulcer 10/20/14 Coccyx #12 stage II pressure ulcer coccyx aquired 1-18-14 (Active)   Number of days: 1515       Pressure Ulcer 11/02/17 Coccyx Mid #15 blocked stage II coccyx ulcer acq: 10-22-17 (Active)   Kiera-wound Assessment Intact; Pink 12/14/2018  8:27 AM   Kiera-Wound Moisture Dry 12/14/2018  8:27 AM   Wound Assessment Fragile;Pink;Red 12/14/2018  8:27 AM   Wound Length (cm) 3.5 cm 12/14/2018  9:12 AM   Wound Width (cm) 1.8 cm 12/14/2018  9:12 AM   Wound Depth (cm)  1.7 12/14/2018  9:12 AM   Calculated Wound Size (cm^2) (l*w) 6.3 cm^2 12/14/2018  9:12 AM   Change in Wound Size % (l*w) 89.66 12/14/2018  9:12 AM   Dressing Status Clean;Dry; Intact 11/15/2018  3:27 PM   Dressing Changed Changed/New 11/15/2018  3:27 PM   Dressing/Treatment ABD; Moist to dry 11/15/2018  3:27 PM   Wound Cleansed Rinsed/Irrigated with saline 11/15/2018  3:27 PM   Dressing Change Due 09/29/18 9/28/2018 10:06 AM   Granulation Quality Red 8/23/2018  3:53 PM   Drainage Amount Moderate 12/14/2018  8:27 AM   Drainage Description Sanguinous 12/14/2018  8:27 AM   Odor None 11/15/2018  3:02 PM   Distance Tunneling (cm) 1 cm 11/23/2018  8:15 AM   Undermining Starts ___ O'Clock 4 12/14/2018  8:27 AM   Undermining Ends___ O'Clock 7 12/14/2018  8:27 AM   Undermining Maxium Distance (cm) [email or web address] 12/14/2018  8:27 AM   Debridement per physician None 11/15/2018  3:15 PM   Time out Yes 12/14/2018  9:12 AM

## 2018-12-28 ENCOUNTER — HOSPITAL ENCOUNTER (OUTPATIENT)
Dept: WOUND CARE | Age: 38
Discharge: HOME | End: 2018-12-28
Payer: MEDICARE

## 2018-12-28 VITALS
RESPIRATION RATE: 14 BRPM | SYSTOLIC BLOOD PRESSURE: 110 MMHG | TEMPERATURE: 99 F | HEART RATE: 80 BPM | DIASTOLIC BLOOD PRESSURE: 64 MMHG

## 2018-12-28 DIAGNOSIS — M46.28 OSTEOMYELITIS OF SACRUM (HCC): ICD-10-CM

## 2018-12-28 PROCEDURE — 11042 DBRDMT SUBQ TIS 1ST 20SQCM/<: CPT

## 2018-12-28 PROCEDURE — 11042 DBRDMT SUBQ TIS 1ST 20SQCM/<: CPT | Performed by: FAMILY MEDICINE

## 2019-01-03 ENCOUNTER — HOSPITAL ENCOUNTER (OUTPATIENT)
Dept: WOUND CARE | Age: 39
Discharge: HOME | End: 2019-01-03
Payer: MEDICARE

## 2019-01-03 VITALS
DIASTOLIC BLOOD PRESSURE: 76 MMHG | RESPIRATION RATE: 18 BRPM | HEART RATE: 84 BPM | TEMPERATURE: 98.7 F | SYSTOLIC BLOOD PRESSURE: 124 MMHG

## 2019-01-03 PROCEDURE — 99212 OFFICE O/P EST SF 10 MIN: CPT

## 2019-01-18 ENCOUNTER — HOSPITAL ENCOUNTER (OUTPATIENT)
Dept: WOUND CARE | Age: 39
Discharge: HOME | End: 2019-01-18
Payer: MEDICARE

## 2019-01-18 VITALS
DIASTOLIC BLOOD PRESSURE: 80 MMHG | SYSTOLIC BLOOD PRESSURE: 112 MMHG | HEART RATE: 85 BPM | TEMPERATURE: 98.8 F | RESPIRATION RATE: 18 BRPM

## 2019-01-18 PROCEDURE — 11042 DBRDMT SUBQ TIS 1ST 20SQCM/<: CPT

## 2019-01-18 PROCEDURE — 11042 DBRDMT SUBQ TIS 1ST 20SQCM/<: CPT | Performed by: FAMILY MEDICINE

## 2019-01-25 ENCOUNTER — HOSPITAL ENCOUNTER (OUTPATIENT)
Dept: WOUND CARE | Age: 39
Discharge: HOME | End: 2019-01-25
Payer: MEDICARE

## 2019-01-25 VITALS
DIASTOLIC BLOOD PRESSURE: 68 MMHG | TEMPERATURE: 98.4 F | RESPIRATION RATE: 14 BRPM | SYSTOLIC BLOOD PRESSURE: 110 MMHG | HEART RATE: 68 BPM | OXYGEN SATURATION: 99 %

## 2019-01-25 DIAGNOSIS — M46.28 OSTEOMYELITIS OF SACRUM (HCC): ICD-10-CM

## 2019-01-25 PROCEDURE — 11042 DBRDMT SUBQ TIS 1ST 20SQCM/<: CPT | Performed by: FAMILY MEDICINE

## 2019-01-25 PROCEDURE — 11042 DBRDMT SUBQ TIS 1ST 20SQCM/<: CPT

## 2019-02-01 ENCOUNTER — HOSPITAL ENCOUNTER (OUTPATIENT)
Dept: WOUND CARE | Age: 39
Discharge: HOME | End: 2019-02-01
Payer: MEDICARE

## 2019-02-08 ENCOUNTER — HOSPITAL ENCOUNTER (OUTPATIENT)
Age: 39
Discharge: HOME | End: 2019-02-08
Payer: MEDICARE

## 2019-02-08 ENCOUNTER — HOSPITAL ENCOUNTER (OUTPATIENT)
Dept: WOUND CARE | Age: 39
Discharge: HOME | End: 2019-02-08
Payer: MEDICARE

## 2019-02-08 VITALS
TEMPERATURE: 98.4 F | SYSTOLIC BLOOD PRESSURE: 118 MMHG | RESPIRATION RATE: 16 BRPM | DIASTOLIC BLOOD PRESSURE: 70 MMHG | HEART RATE: 84 BPM

## 2019-02-08 DIAGNOSIS — M46.28 OSTEOMYELITIS OF SACRUM (HCC): ICD-10-CM

## 2019-02-08 DIAGNOSIS — L98.422 SKIN ULCER OF BACK WITH FAT LAYER EXPOSED (HCC): ICD-10-CM

## 2019-02-08 LAB
ALBUMIN SERPL-MCNC: 3.9 G/DL (ref 3.5–5.2)
ALP BLD-CCNC: 92 U/L (ref 35–104)
ALT SERPL-CCNC: 12 U/L (ref 0–32)
ANION GAP SERPL CALCULATED.3IONS-SCNC: 10 MMOL/L (ref 7–16)
AST SERPL-CCNC: 14 U/L (ref 0–31)
BILIRUB SERPL-MCNC: 0.4 MG/DL (ref 0–1.2)
BUN BLDV-MCNC: 12 MG/DL (ref 6–20)
CALCIUM SERPL-MCNC: 8.9 MG/DL (ref 8.6–10.2)
CHLORIDE BLD-SCNC: 106 MMOL/L (ref 98–107)
CHOLESTEROL, FASTING: 165 MG/DL (ref 0–199)
CO2: 24 MMOL/L (ref 22–29)
CREAT SERPL-MCNC: 0.4 MG/DL (ref 0.5–1)
CREATININE URINE: 80 MG/DL (ref 29–226)
GFR AFRICAN AMERICAN: >60
GFR NON-AFRICAN AMERICAN: >60 ML/MIN/1.73
GLUCOSE FASTING: 98 MG/DL (ref 74–99)
HDLC SERPL-MCNC: 60 MG/DL
LDL CHOLESTEROL CALCULATED: 88 MG/DL (ref 0–99)
MICROALBUMIN UR-MCNC: 30.6 MG/L
MICROALBUMIN/CREAT UR-RTO: 38.3 (ref 0–30)
POTASSIUM SERPL-SCNC: 3.9 MMOL/L (ref 3.5–5)
SODIUM BLD-SCNC: 140 MMOL/L (ref 132–146)
TOTAL CK: 107 U/L (ref 20–180)
TOTAL PROTEIN: 7.9 G/DL (ref 6.4–8.3)
TRIGLYCERIDE, FASTING: 87 MG/DL (ref 0–149)
VITAMIN D 25-HYDROXY: 29 NG/ML (ref 30–100)
VLDLC SERPL CALC-MCNC: 17 MG/DL

## 2019-02-08 PROCEDURE — 82570 ASSAY OF URINE CREATININE: CPT

## 2019-02-08 PROCEDURE — 80061 LIPID PANEL: CPT

## 2019-02-08 PROCEDURE — 80053 COMPREHEN METABOLIC PANEL: CPT

## 2019-02-08 PROCEDURE — 82306 VITAMIN D 25 HYDROXY: CPT

## 2019-02-08 PROCEDURE — 82550 ASSAY OF CK (CPK): CPT

## 2019-02-08 PROCEDURE — 99213 OFFICE O/P EST LOW 20 MIN: CPT

## 2019-02-08 PROCEDURE — 82044 UR ALBUMIN SEMIQUANTITATIVE: CPT

## 2019-02-08 PROCEDURE — 99213 OFFICE O/P EST LOW 20 MIN: CPT | Performed by: FAMILY MEDICINE

## 2019-02-08 PROCEDURE — 36415 COLL VENOUS BLD VENIPUNCTURE: CPT

## 2019-02-15 ENCOUNTER — HOSPITAL ENCOUNTER (OUTPATIENT)
Dept: WOUND CARE | Age: 39
Discharge: HOME | End: 2019-02-15
Payer: MEDICARE

## 2019-02-15 VITALS
BODY MASS INDEX: 42.35 KG/M2 | TEMPERATURE: 98.2 F | SYSTOLIC BLOOD PRESSURE: 110 MMHG | WEIGHT: 183 LBS | DIASTOLIC BLOOD PRESSURE: 66 MMHG | RESPIRATION RATE: 18 BRPM | HEIGHT: 55 IN | HEART RATE: 82 BPM

## 2019-02-15 DIAGNOSIS — L98.422 SKIN ULCER OF BACK WITH FAT LAYER EXPOSED (HCC): ICD-10-CM

## 2019-02-15 DIAGNOSIS — M46.28 OSTEOMYELITIS OF SACRUM (HCC): ICD-10-CM

## 2019-02-15 PROCEDURE — 11042 DBRDMT SUBQ TIS 1ST 20SQCM/<: CPT | Performed by: FAMILY MEDICINE

## 2019-02-15 PROCEDURE — 11042 DBRDMT SUBQ TIS 1ST 20SQCM/<: CPT

## 2019-02-22 ENCOUNTER — HOSPITAL ENCOUNTER (OUTPATIENT)
Dept: WOUND CARE | Age: 39
Discharge: HOME | End: 2019-02-22
Payer: MEDICARE

## 2019-02-22 VITALS
HEART RATE: 80 BPM | DIASTOLIC BLOOD PRESSURE: 66 MMHG | WEIGHT: 183 LBS | HEIGHT: 55 IN | RESPIRATION RATE: 18 BRPM | BODY MASS INDEX: 42.35 KG/M2 | SYSTOLIC BLOOD PRESSURE: 100 MMHG | TEMPERATURE: 99 F

## 2019-02-22 DIAGNOSIS — L98.422 SKIN ULCER OF BACK WITH FAT LAYER EXPOSED (HCC): ICD-10-CM

## 2019-02-22 DIAGNOSIS — M46.28 OSTEOMYELITIS OF SACRUM (HCC): ICD-10-CM

## 2019-02-22 PROCEDURE — 11042 DBRDMT SUBQ TIS 1ST 20SQCM/<: CPT | Performed by: FAMILY MEDICINE

## 2019-02-22 PROCEDURE — 11042 DBRDMT SUBQ TIS 1ST 20SQCM/<: CPT

## 2019-03-01 ENCOUNTER — HOSPITAL ENCOUNTER (OUTPATIENT)
Dept: WOUND CARE | Age: 39
Discharge: HOME | End: 2019-03-01
Payer: MEDICARE

## 2019-03-01 VITALS
RESPIRATION RATE: 16 BRPM | DIASTOLIC BLOOD PRESSURE: 66 MMHG | HEART RATE: 84 BPM | TEMPERATURE: 98.7 F | SYSTOLIC BLOOD PRESSURE: 100 MMHG

## 2019-03-01 DIAGNOSIS — M46.28 OSTEOMYELITIS OF SACRUM (HCC): ICD-10-CM

## 2019-03-01 DIAGNOSIS — L98.422 SKIN ULCER OF BACK WITH FAT LAYER EXPOSED (HCC): ICD-10-CM

## 2019-03-01 PROCEDURE — 11042 DBRDMT SUBQ TIS 1ST 20SQCM/<: CPT

## 2019-03-01 PROCEDURE — 11042 DBRDMT SUBQ TIS 1ST 20SQCM/<: CPT | Performed by: FAMILY MEDICINE

## 2019-03-08 ENCOUNTER — HOSPITAL ENCOUNTER (OUTPATIENT)
Dept: WOUND CARE | Age: 39
Discharge: HOME | End: 2019-03-08
Payer: MEDICARE

## 2019-03-08 VITALS
TEMPERATURE: 98.5 F | DIASTOLIC BLOOD PRESSURE: 80 MMHG | SYSTOLIC BLOOD PRESSURE: 112 MMHG | HEART RATE: 82 BPM | RESPIRATION RATE: 16 BRPM

## 2019-03-08 PROCEDURE — 99212 OFFICE O/P EST SF 10 MIN: CPT | Performed by: FAMILY MEDICINE

## 2019-03-08 PROCEDURE — 99212 OFFICE O/P EST SF 10 MIN: CPT

## 2019-03-15 ENCOUNTER — HOSPITAL ENCOUNTER (OUTPATIENT)
Dept: WOUND CARE | Age: 39
Discharge: HOME | End: 2019-03-15
Payer: MEDICARE

## 2019-03-15 VITALS
DIASTOLIC BLOOD PRESSURE: 60 MMHG | RESPIRATION RATE: 16 BRPM | TEMPERATURE: 98.5 F | HEART RATE: 84 BPM | SYSTOLIC BLOOD PRESSURE: 100 MMHG

## 2019-03-15 DIAGNOSIS — L98.422 SKIN ULCER OF BACK WITH FAT LAYER EXPOSED (HCC): ICD-10-CM

## 2019-03-15 DIAGNOSIS — M46.28 OSTEOMYELITIS OF SACRUM (HCC): ICD-10-CM

## 2019-03-15 PROCEDURE — 11042 DBRDMT SUBQ TIS 1ST 20SQCM/<: CPT | Performed by: FAMILY MEDICINE

## 2019-03-15 PROCEDURE — 11042 DBRDMT SUBQ TIS 1ST 20SQCM/<: CPT

## 2019-03-29 ENCOUNTER — HOSPITAL ENCOUNTER (OUTPATIENT)
Dept: WOUND CARE | Age: 39
Discharge: HOME | End: 2019-03-29
Payer: MEDICARE

## 2019-03-29 VITALS
SYSTOLIC BLOOD PRESSURE: 112 MMHG | RESPIRATION RATE: 16 BRPM | TEMPERATURE: 98.8 F | DIASTOLIC BLOOD PRESSURE: 60 MMHG | HEART RATE: 82 BPM

## 2019-03-29 DIAGNOSIS — L98.422 SKIN ULCER OF BACK WITH FAT LAYER EXPOSED (HCC): ICD-10-CM

## 2019-03-29 DIAGNOSIS — M46.28 OSTEOMYELITIS OF SACRUM (HCC): ICD-10-CM

## 2019-03-29 PROCEDURE — 11042 DBRDMT SUBQ TIS 1ST 20SQCM/<: CPT | Performed by: FAMILY MEDICINE

## 2019-03-29 PROCEDURE — 11042 DBRDMT SUBQ TIS 1ST 20SQCM/<: CPT

## 2019-04-12 ENCOUNTER — HOSPITAL ENCOUNTER (OUTPATIENT)
Dept: WOUND CARE | Age: 39
Discharge: HOME | End: 2019-04-12
Payer: MEDICARE

## 2019-04-12 VITALS
RESPIRATION RATE: 16 BRPM | DIASTOLIC BLOOD PRESSURE: 60 MMHG | TEMPERATURE: 98.8 F | SYSTOLIC BLOOD PRESSURE: 110 MMHG | HEART RATE: 96 BPM

## 2019-04-12 DIAGNOSIS — M46.28 OSTEOMYELITIS OF SACRUM (HCC): ICD-10-CM

## 2019-04-12 DIAGNOSIS — L98.422 SKIN ULCER OF BACK WITH FAT LAYER EXPOSED (HCC): ICD-10-CM

## 2019-04-12 PROCEDURE — 11042 DBRDMT SUBQ TIS 1ST 20SQCM/<: CPT

## 2019-04-12 PROCEDURE — 11042 DBRDMT SUBQ TIS 1ST 20SQCM/<: CPT | Performed by: FAMILY MEDICINE

## 2019-04-12 NOTE — PROGRESS NOTES
Follow-Up Wound Progress Note  Maryam AQUILINO Eliobrandon  AGE: 34 y.o. GENDER: female  DOD: 1980  TODAY'S DATE:  4/12/19  Subjective:    Kiera Tovar is a 34 y.o. female who presents today for wound check. Wound Etiology :  pressure ulcer: Stage IV  Wound Location :  midline and sacral and coccyx Pain : {0/10     Abx : Absent       Overall Wound Assessment  Wound is is unchanged. Size has unchanged    Objective:    /60   Pulse 96   Temp 98.8 °F (37.1 °C) (Oral)   Resp 16   Wound 11/02/17 Coccyx #15 blocked stage II coccyx ulcer acq: 10-22-17 (Active)   Wound Image   1/18/2019  8:23 AM   Dressing Status Clean;Dry; Intact; Changed 3/15/2019 11:56 AM   Dressing Changed Changed/New 3/15/2019 11:56 AM   Dressing/Treatment Alginate;Collagen 3/15/2019 11:56 AM   Wound Cleansed Rinsed/Irrigated with saline 3/15/2019 11:56 AM   Wound Length (cm) 4 cm 4/12/2019  8:41 AM   Wound Width (cm) 2 cm 4/12/2019  8:41 AM   Wound Depth (cm) 1.1 cm 4/12/2019  8:41 AM   Wound Surface Area (cm^2) 8 cm^2 4/12/2019  8:41 AM   Change in Wound Size % (l*w) -48.15 4/12/2019  8:41 AM   Wound Volume (cm^3) 8.8 cm^3 4/12/2019  8:41 AM   Wound Healing % -133 4/12/2019  8:41 AM   Post-Procedure Length (cm) 4 cm 4/12/2019  9:07 AM   Post-Procedure Width (cm) 2 cm 4/12/2019  9:07 AM   Post-Procedure Depth (cm) 1.2 cm 4/12/2019  9:07 AM   Post-Procedure Surface Area (cm^2) 8 cm^2 4/12/2019  9:07 AM   Post-Procedure Volume (cm^3) 9.6 cm^3 4/12/2019  9:07 AM   Distance Tunneling (cm) 1.8 cm 1/3/2019  2:07 PM   Tunneling Position ___ O'Clock 7 1/3/2019  2:07 PM   Undermining Starts ___ O'Clock 5 2/22/2019  8:27 AM   Undermining Ends___ O'Clock 6 2/22/2019  8:27 AM   Undermining Maxium Distance (cm) 1.5 2/22/2019  8:27 AM   Wound Assessment Drainage; Red 4/12/2019  8:41 AM   Drainage Amount Large 4/12/2019  8:41 AM   Drainage Description Serosanguinous 4/12/2019  8:41 AM   Odor None 4/12/2019  8:41 AM   Kiera-wound Assessment Denuded 3/15/2019  8:34 AM   Red%Wound Bed 90 4/12/2019  8:41 AM   Yellow%Wound Bed 10 4/12/2019  8:41 AM   Number of days: 526     Wound   serous exudate noted  Errythema - Present    (this wound was originally measured on:)            Measurements shown are from today's visit. Wound 11/02/17 Coccyx #15 blocked stage II coccyx ulcer acq: 10-22-17-Wound Assessment: Drainage, Red     Wound 11/02/17 Coccyx #15 blocked stage II coccyx ulcer acq: 10-22-17-Wound Assessment: Drainage, Red     Wound 11/02/17 Coccyx #15 blocked stage II coccyx ulcer acq: 10-22-17-Drainage Amount: Large  Wound 11/02/17 Coccyx #15 blocked stage II coccyx ulcer acq: 10-22-17-Drainage Description: Serosanguinous  Wound 11/02/17 Coccyx #15 blocked stage II coccyx ulcer acq: 10-22-17-Odor: None         Wound 11/02/17 Coccyx #15 blocked stage II coccyx ulcer acq: 10-22-17 (Active)   Wound Image   1/18/2019  8:23 AM   Dressing Status Clean;Dry; Intact; Changed 3/15/2019 11:56 AM   Dressing Changed Changed/New 3/15/2019 11:56 AM   Dressing/Treatment Alginate;Collagen 3/15/2019 11:56 AM   Wound Cleansed Rinsed/Irrigated with saline 3/15/2019 11:56 AM   Wound Length (cm) 4 cm 4/12/2019  8:41 AM   Wound Width (cm) 2 cm 4/12/2019  8:41 AM   Wound Depth (cm) 1.1 cm 4/12/2019  8:41 AM   Wound Surface Area (cm^2) 8 cm^2 4/12/2019  8:41 AM   Change in Wound Size % (l*w) -48.15 4/12/2019  8:41 AM   Wound Volume (cm^3) 8.8 cm^3 4/12/2019  8:41 AM   Wound Healing % -133 4/12/2019  8:41 AM   Post-Procedure Length (cm) 4 cm 4/12/2019  9:07 AM   Post-Procedure Width (cm) 2 cm 4/12/2019  9:07 AM   Post-Procedure Depth (cm) 1.2 cm 4/12/2019  9:07 AM   Post-Procedure Surface Area (cm^2) 8 cm^2 4/12/2019  9:07 AM   Post-Procedure Volume (cm^3) 9.6 cm^3 4/12/2019  9:07 AM   Distance Tunneling (cm) 1.8 cm 1/3/2019  2:07 PM   Tunneling Position ___ O'Clock 7 1/3/2019  2:07 PM   Undermining Starts ___ O'Clock 5 2/22/2019  8:27 AM   Undermining Ends___ O'Clock 6 2/22/2019  8:27 AM 3. Discussed appropriate home care of this wound. 4. Patient instructions were given. 5. Follow Up in 2 week(s).                                      Manuel Hines DO                           4/12/19     11:21 AM

## 2019-04-26 ENCOUNTER — HOSPITAL ENCOUNTER (OUTPATIENT)
Dept: WOUND CARE | Age: 39
Discharge: HOME | End: 2019-04-26
Payer: MEDICARE

## 2019-04-26 VITALS
OXYGEN SATURATION: 100 % | SYSTOLIC BLOOD PRESSURE: 110 MMHG | HEART RATE: 82 BPM | DIASTOLIC BLOOD PRESSURE: 68 MMHG | RESPIRATION RATE: 14 BRPM | TEMPERATURE: 97.9 F

## 2019-04-26 DIAGNOSIS — M46.28 OSTEOMYELITIS OF SACRUM (HCC): ICD-10-CM

## 2019-04-26 PROCEDURE — 11042 DBRDMT SUBQ TIS 1ST 20SQCM/<: CPT | Performed by: FAMILY MEDICINE

## 2019-04-26 PROCEDURE — 11042 DBRDMT SUBQ TIS 1ST 20SQCM/<: CPT

## 2019-04-26 NOTE — PROGRESS NOTES
Follow-Up Wound Progress Note  Maryam Casper  AGE: 34 y.o. GENDER: female  DOD: 1980  TODAY'S DATE:  4/26/19  Subjective:    Cuauhtemoc Da Silva is a 34 y.o. female who presents today for wound check. Wound Etiology :  pressure ulcer: Stage IV  Wound Location :  midline and sacral and coccyx Pain : {0/10     Abx : Absent       Overall Wound Assessment  Wound is has slightly improved. Size has decreased    Objective:    /68   Pulse 82   Temp 97.9 °F (36.6 °C) (Oral)   Resp 14   SpO2 100%   Wound 11/02/17 Coccyx #15 blocked stage II coccyx ulcer acq: 10-22-17 (Active)   Dressing Status New drainage; Changed 1/25/2019  9:32 AM   Dressing Changed Changed/New 1/25/2019  9:32 AM   Dressing/Treatment Collagen with Ag;Alginate;Xeroform 1/25/2019  9:32 AM   Wound Cleansed Rinsed/Irrigated with saline 1/25/2019  9:32 AM   Wound Length (cm) 2 cm 4/26/2019  8:25 AM   Wound Width (cm) 1.5 cm 4/26/2019  8:25 AM   Wound Depth (cm) 1.5 cm 4/26/2019  8:25 AM   Wound Surface Area (cm^2) 3 cm^2 4/26/2019  8:25 AM   Change in Wound Size % (l*w) 44.44 4/26/2019  8:25 AM   Wound Volume (cm^3) 4.5 cm^3 4/26/2019  8:25 AM   Wound Healing % -19 4/26/2019  8:25 AM   Post-Procedure Length (cm) 2 cm 4/26/2019  9:29 AM   Post-Procedure Width (cm) 1.5 cm 4/26/2019  9:29 AM   Post-Procedure Depth (cm) 1.7 cm 4/26/2019  9:29 AM   Post-Procedure Surface Area (cm^2) 3 cm^2 4/26/2019  9:29 AM   Post-Procedure Volume (cm^3) 5.1 cm^3 4/26/2019  9:29 AM   Wound Assessment Clean;Dry; Intact 4/26/2019  8:25 AM   Drainage Amount Large 4/26/2019  8:25 AM   Drainage Description Serosanguinous 4/26/2019  8:25 AM   Odor None 4/26/2019  8:25 AM   Kiera-wound Assessment Fragile;Pink 4/26/2019  8:25 AM   Red%Wound Bed 90 4/12/2019  8:41 AM   Yellow%Wound Bed 10 4/12/2019  8:41 AM   Number of days: 540     Wound   serous exudate noted  Errythema - Present    (this wound was originally measured on:)            Measurements shown are from Please refer to nursing measurements and assessment regarding wound size pre and post debridement. Wound check - Care provided includes removal of existing dressing and visual inspection    Procedure: Debridement Note: Wound # 15. At 3 cm sq. The patient was placed in the prone position. Lidocaine  gauze was applied  at beginning of wound evaluation. An  Excisional Debridement was performed. Using a curette ,  the wound was debrided sharply of all fibrotic, necrotic, and hyperkeratotic tissue, including a layer of surrounding healthy tissue to stimulate epithelization. The wound was excised through the level of the subcutaneous Wound Percentage debrided is 100%. Please refer to Nurses notes for pre and post debridement dimensions. Wound was irrigated with normal saline solution. Bleeding was with a moderate amount of bleeding, and controlled with pressure packing applied. Patient tolerated procedure well and was given proper instruction. Diagnosis: pressure ulcer: Stage IV                      Patient Active Problem List   Diagnosis Code    Decubitus skin ulcer. 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 Collagen and Alginate                              3. Discussed appropriate home care of this wound. 4. Patient instructions were given. 5. Follow Up in 1 week(s).                                      Brantley Blizzard, DO                           4/26/19     10:50 AM

## 2019-05-03 ENCOUNTER — HOSPITAL ENCOUNTER (OUTPATIENT)
Dept: WOUND CARE | Age: 39
Discharge: HOME | End: 2019-05-03
Payer: MEDICARE

## 2019-05-03 VITALS
HEART RATE: 88 BPM | TEMPERATURE: 98.8 F | DIASTOLIC BLOOD PRESSURE: 68 MMHG | SYSTOLIC BLOOD PRESSURE: 118 MMHG | HEIGHT: 55 IN | BODY MASS INDEX: 42.35 KG/M2 | WEIGHT: 183 LBS | RESPIRATION RATE: 16 BRPM

## 2019-05-03 DIAGNOSIS — M46.28 OSTEOMYELITIS OF SACRUM (HCC): ICD-10-CM

## 2019-05-03 DIAGNOSIS — L98.422 SKIN ULCER OF BACK WITH FAT LAYER EXPOSED (HCC): ICD-10-CM

## 2019-05-03 PROCEDURE — 11042 DBRDMT SUBQ TIS 1ST 20SQCM/<: CPT

## 2019-05-03 PROCEDURE — 11042 DBRDMT SUBQ TIS 1ST 20SQCM/<: CPT | Performed by: FAMILY MEDICINE

## 2019-05-03 NOTE — PROGRESS NOTES
5/3/2019  8:26 AM   Undermining Ends___ O'Clock 6 5/3/2019  8:26 AM   Undermining Maxium Distance (cm) 2.2 5/3/2019  8:26 AM   Wound Assessment Pink 5/3/2019  8:26 AM   Drainage Amount Large 5/3/2019  8:26 AM   Drainage Description Serosanguinous 5/3/2019  8:26 AM   Odor None 5/3/2019  8:26 AM   Kiera-wound Assessment Fragile;Pink 4/26/2019  8:25 AM   Red%Wound Bed 90 4/12/2019  8:41 AM   Yellow%Wound Bed 10 4/12/2019  8:41 AM   Number of days: 547       Assessment:     Please refer to nursing measurements and assessment regarding wound size pre and post debridement. Wound check - Care provided includes removal of existing dressing and visual inspection    Procedure: Debridement Note: Wound # 15. At 3.25 cm sq. The patient was placed in the prone position. Lidocaine  gauze was applied  at beginning of wound evaluation. An  Excisional Debridement was performed. Using a curette ,  the wound was debrided sharply of all fibrotic, necrotic, and hyperkeratotic tissue, including a layer of surrounding healthy tissue to stimulate epithelization. The wound was excised through the level of the subcutaneous Wound Percentage debrided is 100%. Please refer to Nurses notes for pre and post debridement dimensions. Wound was irrigated with normal saline solution. Bleeding was with a moderate amount of bleeding, and controlled with pressure. Patient tolerated procedure well and was given proper instruction. Diagnosis: pressure ulcer: Stage IV                      Patient Active Problem List   Diagnosis Code    Decubitus skin ulcer.  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 Excisional Debridement                              2. Alginate and Collagen on superior portion                              3. Discussed appropriate home care of this wound. 4. Patient instructions were given. 5. Follow Up in 1 week(s).                                      Melba Manuel DO                           5/3/19     12:16 PM

## 2019-05-10 ENCOUNTER — HOSPITAL ENCOUNTER (OUTPATIENT)
Dept: WOUND CARE | Age: 39
Discharge: HOME | End: 2019-05-10
Payer: MEDICARE

## 2019-05-10 VITALS
SYSTOLIC BLOOD PRESSURE: 122 MMHG | HEART RATE: 70 BPM | WEIGHT: 183 LBS | DIASTOLIC BLOOD PRESSURE: 86 MMHG | TEMPERATURE: 97.8 F | HEIGHT: 55 IN | RESPIRATION RATE: 16 BRPM | BODY MASS INDEX: 42.35 KG/M2

## 2019-05-10 PROCEDURE — 11042 DBRDMT SUBQ TIS 1ST 20SQCM/<: CPT

## 2019-05-10 PROCEDURE — 11042 DBRDMT SUBQ TIS 1ST 20SQCM/<: CPT | Performed by: FAMILY MEDICINE

## 2019-05-10 NOTE — PROGRESS NOTES
Follow-Up Wound Progress Note  Maryam Casper  AGE: 37 y.o. GENDER: female  DOD: 1980  TODAY'S DATE:  5/10/19  Subjective:    Stella Damon is a 37 y.o. female who presents today for wound check. Wound Etiology :  pressure ulcer: Stage IV  Wound Location :  midline and sacral /Coccyx Pain : {1/10     Abx : Absent       Overall Wound Assessment  Wound is has slightly improved.     Size has decreased    Objective:    /86   Pulse 70   Temp 97.8 °F (36.6 °C) (Oral)   Resp 16   Ht 4' 3\" (1.295 m)   Wt 183 lb (83 kg)   BMI 49.47 kg/m²   Wound 11/02/17 Coccyx #15 blocked stage II coccyx ulcer acq: 10-22-17 (Active)   Wound Image   1/18/2019  8:23 AM   Dressing Status Changed;New drainage 5/10/2019  9:09 AM   Dressing Changed Changed/New 5/10/2019  9:09 AM   Dressing/Treatment Alginate with Ag 4/26/2019 11:59 AM   Wound Cleansed Rinsed/Irrigated with saline 5/10/2019  9:09 AM   Wound Length (cm) 3.2 cm 5/10/2019  8:17 AM   Wound Width (cm) 2 cm 5/10/2019  8:17 AM   Wound Depth (cm) 1 cm 5/10/2019  8:17 AM   Wound Surface Area (cm^2) 6.4 cm^2 5/10/2019  8:17 AM   Change in Wound Size % (l*w) -18.52 5/10/2019  8:17 AM   Wound Volume (cm^3) 6.4 cm^3 5/10/2019  8:17 AM   Wound Healing % -69 5/10/2019  8:17 AM   Post-Procedure Length (cm) 3.2 cm 5/10/2019  8:59 AM   Post-Procedure Width (cm) 2 cm 5/10/2019  8:59 AM   Post-Procedure Depth (cm) 1 cm 5/10/2019  8:59 AM   Post-Procedure Surface Area (cm^2) 6.4 cm^2 5/10/2019  8:59 AM   Post-Procedure Volume (cm^3) 6.4 cm^3 5/10/2019  8:59 AM   Distance Tunneling (cm) 2 cm 5/10/2019  8:17 AM   Tunneling Position ___ O'Clock 6 5/10/2019  8:17 AM   Undermining Starts ___ O'Clock 5 5/3/2019  8:26 AM   Undermining Ends___ O'Clock 6 5/3/2019  8:26 AM   Undermining Maxium Distance (cm) 2.2 5/3/2019  8:26 AM   Wound Assessment Red 5/10/2019  8:17 AM   Drainage Amount Moderate 5/10/2019  8:17 AM   Drainage Description Serosanguinous 5/10/2019  8:17 AM   Odor None 5/10/2019  8:17 AM   Kiera-wound Assessment Fragile;Pink 5/10/2019  8:17 AM   Red%Wound Bed 90 4/12/2019  8:41 AM   Yellow%Wound Bed 10 4/12/2019  8:41 AM   Number of days: 554     Wound   serous exudate noted  Errythema - Present    (this wound was originally measured on:)            Measurements shown are from today's visit. Wound 11/02/17 Coccyx #15 blocked stage II coccyx ulcer acq: 10-22-17-Wound Assessment: Red     Wound 11/02/17 Coccyx #15 blocked stage II coccyx ulcer acq: 10-22-17-Wound Assessment: Red  Wound 11/02/17 Coccyx #15 blocked stage II coccyx ulcer acq: 10-22-17-Kiera-wound Assessment: Fragile, Pink  Wound 11/02/17 Coccyx #15 blocked stage II coccyx ulcer acq: 10-22-17-Drainage Amount:  Moderate  Wound 11/02/17 Coccyx #15 blocked stage II coccyx ulcer acq: 10-22-17-Drainage Description: Serosanguinous  Wound 11/02/17 Coccyx #15 blocked stage II coccyx ulcer acq: 10-22-17-Odor: None         Wound 11/02/17 Coccyx #15 blocked stage II coccyx ulcer acq: 10-22-17 (Active)   Wound Image   1/18/2019  8:23 AM   Dressing Status Changed;New drainage 5/10/2019  9:09 AM   Dressing Changed Changed/New 5/10/2019  9:09 AM   Dressing/Treatment Alginate with Ag 4/26/2019 11:59 AM   Wound Cleansed Rinsed/Irrigated with saline 5/10/2019  9:09 AM   Wound Length (cm) 3.2 cm 5/10/2019  8:17 AM   Wound Width (cm) 2 cm 5/10/2019  8:17 AM   Wound Depth (cm) 1 cm 5/10/2019  8:17 AM   Wound Surface Area (cm^2) 6.4 cm^2 5/10/2019  8:17 AM   Change in Wound Size % (l*w) -18.52 5/10/2019  8:17 AM   Wound Volume (cm^3) 6.4 cm^3 5/10/2019  8:17 AM   Wound Healing % -69 5/10/2019  8:17 AM   Post-Procedure Length (cm) 3.2 cm 5/10/2019  8:59 AM   Post-Procedure Width (cm) 2 cm 5/10/2019  8:59 AM   Post-Procedure Depth (cm) 1 cm 5/10/2019  8:59 AM   Post-Procedure Surface Area (cm^2) 6.4 cm^2 5/10/2019  8:59 AM   Post-Procedure Volume (cm^3) 6.4 cm^3 5/10/2019  8:59 AM   Distance Tunneling (cm) 2 cm 5/10/2019  8:17 AM   Tunneling Plan:      Treatment & Plan: 1.Excisional Debridement                              2. Collagen and alginate in the draining portion of wound. 3. Discussed appropriate home care of this wound. 4. Patient instructions were given. 5. Follow Up in 1 week(s).                                      Jaimee Carvajal DO                           5/10/19     9:44 AM

## 2019-05-17 ENCOUNTER — HOSPITAL ENCOUNTER (OUTPATIENT)
Dept: WOUND CARE | Age: 39
Discharge: HOME | End: 2019-05-17
Payer: MEDICARE

## 2019-05-17 VITALS
TEMPERATURE: 98.9 F | WEIGHT: 183 LBS | BODY MASS INDEX: 42.35 KG/M2 | RESPIRATION RATE: 16 BRPM | DIASTOLIC BLOOD PRESSURE: 88 MMHG | HEART RATE: 84 BPM | SYSTOLIC BLOOD PRESSURE: 118 MMHG | HEIGHT: 55 IN

## 2019-05-17 PROCEDURE — 11042 DBRDMT SUBQ TIS 1ST 20SQCM/<: CPT

## 2019-05-17 PROCEDURE — 11042 DBRDMT SUBQ TIS 1ST 20SQCM/<: CPT | Performed by: FAMILY MEDICINE

## 2019-05-17 NOTE — PROGRESS NOTES
Follow-Up Wound Progress Note  Maryam Casper  AGE: 37 y.o. GENDER: female  DOD: 1980  TODAY'S DATE:  5/17/19  Subjective:    Gualberto Bassett is a 37 y.o. female who presents today for wound check. Wound Etiology :  pressure ulcer: Stage IV  Wound Location :  midline and sacral and coccyx Pain : {0/10     Abx : Present      Overall Wound Assessment  Wound is has slightly improved.     Size has decreased    Objective:    /88   Pulse 84   Temp 98.9 °F (37.2 °C) (Oral)   Resp 16   Ht 4' 3\" (1.295 m)   Wt 183 lb (83 kg)   BMI 49.47 kg/m²   Wound 11/02/17 Coccyx #15 blocked stage II coccyx ulcer acq: 10-22-17 (Active)   Wound Image   1/18/2019  8:23 AM   Dressing Status Changed;New drainage 5/10/2019  9:09 AM   Dressing Changed Changed/New 5/17/2019  8:54 AM   Dressing/Treatment Alginate 5/17/2019  8:54 AM   Wound Cleansed Rinsed/Irrigated with saline 5/10/2019  9:09 AM   Wound Length (cm) 3 cm 5/17/2019  8:44 AM   Wound Width (cm) 1 cm 5/17/2019  8:44 AM   Wound Depth (cm) 2.8 cm 5/17/2019  8:44 AM   Wound Surface Area (cm^2) 3 cm^2 5/17/2019  8:44 AM   Change in Wound Size % (l*w) 44.44 5/17/2019  8:44 AM   Wound Volume (cm^3) 8.4 cm^3 5/17/2019  8:44 AM   Wound Healing % -122 5/17/2019  8:44 AM   Post-Procedure Length (cm) 3 cm 5/17/2019  8:52 AM   Post-Procedure Width (cm) 1 cm 5/17/2019  8:52 AM   Post-Procedure Depth (cm) 2.9 cm 5/17/2019  8:52 AM   Post-Procedure Surface Area (cm^2) 3 cm^2 5/17/2019  8:52 AM   Post-Procedure Volume (cm^3) 8.7 cm^3 5/17/2019  8:52 AM   Distance Tunneling (cm) 2 cm 5/10/2019  8:17 AM   Tunneling Position ___ O'Clock 6 5/10/2019  8:17 AM   Undermining Starts ___ O'Clock 5 5/17/2019  8:44 AM   Undermining Ends___ O'Clock 9 5/17/2019  8:44 AM   Undermining Maxium Distance (cm) 2.9 5/17/2019  8:44 AM   Wound Assessment Red 5/17/2019  8:44 AM   Drainage Amount Moderate 5/17/2019  8:44 AM   Drainage Description Serosanguinous 5/17/2019  8:44 AM   Odor None 5/17/2019  8:44 AM   Kiera-wound Assessment Pink 5/17/2019  8:44 AM   Red%Wound Bed 90 4/12/2019  8:41 AM   Yellow%Wound Bed 10 4/12/2019  8:41 AM   Number of days: 561     Wound   serous exudate noted  Errythema - Present    (this wound was originally measured on:)            Measurements shown are from today's visit. Wound 11/02/17 Coccyx #15 blocked stage II coccyx ulcer acq: 10-22-17-Wound Assessment: Red     Wound 11/02/17 Coccyx #15 blocked stage II coccyx ulcer acq: 10-22-17-Wound Assessment: Red  Wound 11/02/17 Coccyx #15 blocked stage II coccyx ulcer acq: 10-22-17-Kiera-wound Assessment: Pink  Wound 11/02/17 Coccyx #15 blocked stage II coccyx ulcer acq: 10-22-17-Drainage Amount:  Moderate  Wound 11/02/17 Coccyx #15 blocked stage II coccyx ulcer acq: 10-22-17-Drainage Description: Serosanguinous  Wound 11/02/17 Coccyx #15 blocked stage II coccyx ulcer acq: 10-22-17-Odor: None         Wound 11/02/17 Coccyx #15 blocked stage II coccyx ulcer acq: 10-22-17 (Active)   Wound Image   1/18/2019  8:23 AM   Dressing Status Changed;New drainage 5/10/2019  9:09 AM   Dressing Changed Changed/New 5/17/2019  8:54 AM   Dressing/Treatment Alginate 5/17/2019  8:54 AM   Wound Cleansed Rinsed/Irrigated with saline 5/10/2019  9:09 AM   Wound Length (cm) 3 cm 5/17/2019  8:44 AM   Wound Width (cm) 1 cm 5/17/2019  8:44 AM   Wound Depth (cm) 2.8 cm 5/17/2019  8:44 AM   Wound Surface Area (cm^2) 3 cm^2 5/17/2019  8:44 AM   Change in Wound Size % (l*w) 44.44 5/17/2019  8:44 AM   Wound Volume (cm^3) 8.4 cm^3 5/17/2019  8:44 AM   Wound Healing % -122 5/17/2019  8:44 AM   Post-Procedure Length (cm) 3 cm 5/17/2019  8:52 AM   Post-Procedure Width (cm) 1 cm 5/17/2019  8:52 AM   Post-Procedure Depth (cm) 2.9 cm 5/17/2019  8:52 AM   Post-Procedure Surface Area (cm^2) 3 cm^2 5/17/2019  8:52 AM   Post-Procedure Volume (cm^3) 8.7 cm^3 5/17/2019  8:52 AM   Distance Tunneling (cm) 2 cm 5/10/2019  8:17 AM   Tunneling Position ___ O'Clock 6 5/10/2019 8: 17 AM   Undermining Starts ___ O'Clock 5 5/17/2019  8:44 AM   Undermining Ends___ O'Clock 9 5/17/2019  8:44 AM   Undermining Maxium Distance (cm) 2.9 5/17/2019  8:44 AM   Wound Assessment Red 5/17/2019  8:44 AM   Drainage Amount Moderate 5/17/2019  8:44 AM   Drainage Description Serosanguinous 5/17/2019  8:44 AM   Odor None 5/17/2019  8:44 AM   Kiera-wound Assessment Pink 5/17/2019  8:44 AM   Red%Wound Bed 90 4/12/2019  8:41 AM   Yellow%Wound Bed 10 4/12/2019  8:41 AM   Number of days: 561       Assessment:     Please refer to nursing measurements and assessment regarding wound size pre and post debridement. Wound check - Care provided includes removal of existing dressing and visual inspection    Procedure: Debridement Note: Wound # 15. At 3 cm sq. The patient was placed in the prone position. Lidocaine  gauze was applied  at beginning of wound evaluation. An  Excisional Debridement was performed. Using a curette ,  the wound was debrided sharply of all fibrotic, necrotic, and hyperkeratotic tissue, including a layer of surrounding healthy tissue to stimulate epithelization. The wound was excised through the level of the subcutaneous Wound Percentage debrided is 100%. Please refer to Nurses notes for pre and post debridement dimensions. Wound was irrigated with normal saline solution. Bleeding was with a small amount of bleeding, and controlled with pressure. Patient tolerated procedure well and was given proper instruction. Diagnosis: pressure ulcer: Stage IV                      Patient Active Problem List   Diagnosis Code    Decubitus skin ulcer.  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 Excisional Debridement                              2. Alginate covered by Drawtex and Collagen on the Superior portion of wound. 3. Discussed appropriate home care of this wound. 4. Patient instructions were given. 5. Follow Up in 1 week(s).                                      Johana Stokes DO                           5/17/19     11:13 AM

## 2019-05-24 ENCOUNTER — HOSPITAL ENCOUNTER (OUTPATIENT)
Dept: WOUND CARE | Age: 39
Discharge: HOME | End: 2019-05-24
Payer: MEDICARE

## 2019-05-24 VITALS
DIASTOLIC BLOOD PRESSURE: 60 MMHG | HEART RATE: 78 BPM | SYSTOLIC BLOOD PRESSURE: 106 MMHG | TEMPERATURE: 98.3 F | RESPIRATION RATE: 14 BRPM

## 2019-05-24 DIAGNOSIS — M46.28 OSTEOMYELITIS OF SACRUM (HCC): ICD-10-CM

## 2019-05-24 PROCEDURE — 11042 DBRDMT SUBQ TIS 1ST 20SQCM/<: CPT | Performed by: FAMILY MEDICINE

## 2019-05-24 PROCEDURE — 11042 DBRDMT SUBQ TIS 1ST 20SQCM/<: CPT

## 2019-05-24 NOTE — PROGRESS NOTES
5. Follow Up in 1 week(s).                                      Lyndon Massey DO                           5/24/19     1:43 PM

## 2019-05-31 ENCOUNTER — HOSPITAL ENCOUNTER (OUTPATIENT)
Dept: WOUND CARE | Age: 39
Discharge: HOME | End: 2019-05-31
Payer: MEDICARE

## 2019-05-31 VITALS
SYSTOLIC BLOOD PRESSURE: 98 MMHG | DIASTOLIC BLOOD PRESSURE: 64 MMHG | BODY MASS INDEX: 42.35 KG/M2 | HEIGHT: 55 IN | WEIGHT: 183 LBS | HEART RATE: 80 BPM | TEMPERATURE: 99.4 F | RESPIRATION RATE: 16 BRPM

## 2019-05-31 DIAGNOSIS — L89.154 PRESSURE INJURY OF SACRAL REGION, STAGE 4 (HCC): Primary | ICD-10-CM

## 2019-05-31 PROCEDURE — 99212 OFFICE O/P EST SF 10 MIN: CPT

## 2019-05-31 PROCEDURE — 99213 OFFICE O/P EST LOW 20 MIN: CPT | Performed by: FAMILY MEDICINE

## 2019-05-31 RX ORDER — SULFAMETHOXAZOLE AND TRIMETHOPRIM 800; 160 MG/1; MG/1
1 TABLET ORAL DAILY
Qty: 20 TABLET | Refills: 0 | Status: SHIPPED | OUTPATIENT
Start: 2019-05-31 | End: 2019-06-28 | Stop reason: ALTCHOICE

## 2019-05-31 NOTE — PROGRESS NOTES
Follow-Up Wound Progress Note  Maryam Casper  AGE: 37 y.o. GENDER: female  DOD: 1980  TODAY'S DATE:  5/31/19  Subjective:    Mariela Castillo is a 37 y.o. female who presents today for wound check. Wound Etiology :  pressure ulcer: Stage IV  Wound Location :  midline and sacral and coccyx Pain : {0/10     Abx : Present Rewritten today as suppression Tx     Overall Wound Assessment  Wound is has improved.     Size has decreased    Objective:    BP 98/64   Pulse 80   Temp 99.4 °F (37.4 °C) (Oral)   Resp 16   Ht 4' 3\" (1.295 m)   Wt 183 lb (83 kg)   BMI 49.47 kg/m²   Wound 11/02/17 Coccyx #15 blocked stage II coccyx ulcer acq: 10-22-17 (Active)   Wound Image   1/18/2019  8:23 AM   Dressing Status Changed;New drainage 5/10/2019  9:09 AM   Dressing Changed Changed/New 5/24/2019  9:06 AM   Dressing/Treatment Dry Dressing 5/24/2019  9:06 AM   Wound Cleansed Rinsed/Irrigated with saline 5/24/2019  9:06 AM   Wound Length (cm) 2.2 cm 5/31/2019  8:14 AM   Wound Width (cm) 1.6 cm 5/31/2019  8:14 AM   Wound Depth (cm) 1.2 cm 5/31/2019  8:14 AM   Wound Surface Area (cm^2) 3.52 cm^2 5/31/2019  8:14 AM   Change in Wound Size % (l*w) 34.81 5/31/2019  8:14 AM   Wound Volume (cm^3) 4.22 cm^3 5/31/2019  8:14 AM   Wound Healing % -12 5/31/2019  8:14 AM   Post-Procedure Length (cm) 2.5 cm 5/24/2019  9:05 AM   Post-Procedure Width (cm) 1 cm 5/24/2019  9:05 AM   Post-Procedure Depth (cm) 0.6 cm 5/24/2019  9:05 AM   Post-Procedure Surface Area (cm^2) 2.5 cm^2 5/24/2019  9:05 AM   Post-Procedure Volume (cm^3) 1.5 cm^3 5/24/2019  9:05 AM   Distance Tunneling (cm) 2 cm 5/10/2019  8:17 AM   Tunneling Position ___ O'Clock 6 5/10/2019  8:17 AM   Undermining Starts ___ O'Clock 3 5/31/2019  8:14 AM   Undermining Ends___ O'Clock 9 5/31/2019  8:14 AM   Undermining Maxium Distance (cm) 2.8 5/31/2019  8:14 AM   Wound Assessment Red 5/31/2019  8:14 AM   Drainage Amount Small 5/31/2019  8:14 AM   Drainage Description Serosanguinous

## 2019-06-07 ENCOUNTER — HOSPITAL ENCOUNTER (OUTPATIENT)
Dept: WOUND CARE | Age: 39
Discharge: HOME | End: 2019-06-07
Payer: MEDICARE

## 2019-06-14 ENCOUNTER — HOSPITAL ENCOUNTER (OUTPATIENT)
Dept: WOUND CARE | Age: 39
Discharge: HOME | End: 2019-06-14
Payer: MEDICARE

## 2019-06-14 ENCOUNTER — HOSPITAL ENCOUNTER (OUTPATIENT)
Age: 39
Discharge: HOME | End: 2019-06-14
Payer: MEDICARE

## 2019-06-14 VITALS
HEART RATE: 92 BPM | RESPIRATION RATE: 16 BRPM | SYSTOLIC BLOOD PRESSURE: 110 MMHG | WEIGHT: 183 LBS | HEIGHT: 55 IN | TEMPERATURE: 99.3 F | BODY MASS INDEX: 42.35 KG/M2 | DIASTOLIC BLOOD PRESSURE: 80 MMHG

## 2019-06-14 DIAGNOSIS — M46.28 OSTEOMYELITIS OF SACRUM (HCC): ICD-10-CM

## 2019-06-14 DIAGNOSIS — L89.154 PRESSURE INJURY OF SACRAL REGION, STAGE 4 (HCC): ICD-10-CM

## 2019-06-14 DIAGNOSIS — L89.154 PRESSURE INJURY OF SACRAL REGION, STAGE 4 (HCC): Primary | ICD-10-CM

## 2019-06-14 LAB
BASOPHILS ABSOLUTE: 0.04 E9/L (ref 0–0.2)
BASOPHILS RELATIVE PERCENT: 0.5 % (ref 0–2)
EOSINOPHILS ABSOLUTE: 0.18 E9/L (ref 0.05–0.5)
EOSINOPHILS RELATIVE PERCENT: 2.1 % (ref 0–6)
HCT VFR BLD CALC: 43.8 % (ref 34–48)
HEMOGLOBIN: 13.4 G/DL (ref 11.5–15.5)
IMMATURE GRANULOCYTES #: 0.02 E9/L
IMMATURE GRANULOCYTES %: 0.2 % (ref 0–5)
LYMPHOCYTES ABSOLUTE: 1.03 E9/L (ref 1.5–4)
LYMPHOCYTES RELATIVE PERCENT: 11.8 % (ref 20–42)
MCH RBC QN AUTO: 24.8 PG (ref 26–35)
MCHC RBC AUTO-ENTMCNC: 30.6 % (ref 32–34.5)
MCV RBC AUTO: 81 FL (ref 80–99.9)
MONOCYTES ABSOLUTE: 0.42 E9/L (ref 0.1–0.95)
MONOCYTES RELATIVE PERCENT: 4.8 % (ref 2–12)
NEUTROPHILS ABSOLUTE: 7.04 E9/L (ref 1.8–7.3)
NEUTROPHILS RELATIVE PERCENT: 80.6 % (ref 43–80)
PDW BLD-RTO: 17.4 FL (ref 11.5–15)
PLATELET # BLD: 314 E9/L (ref 130–450)
PMV BLD AUTO: 10.2 FL (ref 7–12)
RBC # BLD: 5.41 E12/L (ref 3.5–5.5)
WBC # BLD: 8.7 E9/L (ref 4.5–11.5)

## 2019-06-14 PROCEDURE — 11042 DBRDMT SUBQ TIS 1ST 20SQCM/<: CPT

## 2019-06-14 PROCEDURE — 87070 CULTURE OTHR SPECIMN AEROBIC: CPT

## 2019-06-14 PROCEDURE — 36415 COLL VENOUS BLD VENIPUNCTURE: CPT

## 2019-06-14 PROCEDURE — 85025 COMPLETE CBC W/AUTO DIFF WBC: CPT

## 2019-06-14 PROCEDURE — 11042 DBRDMT SUBQ TIS 1ST 20SQCM/<: CPT | Performed by: FAMILY MEDICINE

## 2019-06-14 PROCEDURE — 87075 CULTR BACTERIA EXCEPT BLOOD: CPT

## 2019-06-14 PROCEDURE — 87205 SMEAR GRAM STAIN: CPT

## 2019-06-14 NOTE — PROGRESS NOTES
Follow-Up Wound Progress Note  Maryam Casper  AGE: 37 y.o. GENDER: female  DOD: 1980  TODAY'S DATE:  6/14/19  Subjective:    Ole Sifuentes is a 37 y.o. female who presents today for wound check. Wound Etiology :  pressure ulcer: Stage IV  Wound Location :  midline and sacral and coccyx Pain : {0/10     Abx : Present      Overall Wound Assessment  Wound is has slightly improved.     Size has decreased    Objective:    /80   Pulse 92   Temp 99.3 °F (37.4 °C) (Oral)   Resp 16   Ht 4' 3\" (1.295 m)   Wt 183 lb (83 kg)   BMI 49.47 kg/m²   Wound 11/02/17 Coccyx #15 blocked stage II coccyx ulcer acq: 10-22-17 (Active)   Wound Image   6/14/2019  8:13 AM   Dressing Status Changed;New drainage 5/10/2019  9:09 AM   Dressing Changed Changed/New 5/24/2019  9:06 AM   Dressing/Treatment Dry Dressing 5/24/2019  9:06 AM   Wound Cleansed Rinsed/Irrigated with saline 5/24/2019  9:06 AM   Wound Length (cm) 2 cm 6/14/2019  8:13 AM   Wound Width (cm) 1.7 cm 6/14/2019  8:13 AM   Wound Depth (cm) 1.3 cm 6/14/2019  8:13 AM   Wound Surface Area (cm^2) 3.4 cm^2 6/14/2019  8:13 AM   Change in Wound Size % (l*w) 37.04 6/14/2019  8:13 AM   Wound Volume (cm^3) 4.42 cm^3 6/14/2019  8:13 AM   Wound Healing % -17 6/14/2019  8:13 AM   Post-Procedure Length (cm) 2 cm 6/14/2019  9:27 AM   Post-Procedure Width (cm) 1.8 cm 6/14/2019  9:27 AM   Post-Procedure Depth (cm) 1.3 cm 6/14/2019  9:27 AM   Post-Procedure Surface Area (cm^2) 3.6 cm^2 6/14/2019  9:27 AM   Post-Procedure Volume (cm^3) 4.68 cm^3 6/14/2019  9:27 AM   Distance Tunneling (cm) 2 cm 5/10/2019  8:17 AM   Tunneling Position ___ O'Clock 6 5/10/2019  8:17 AM   Undermining Starts ___ O'Clock 3 6/14/2019  8:13 AM   Undermining Ends___ O'Clock 9 6/14/2019  8:13 AM   Undermining Maxium Distance (cm) 1.9 6/14/2019  8:13 AM   Wound Assessment Red 6/14/2019  8:13 AM   Drainage Amount Moderate 6/14/2019  8:13 AM   Drainage Description Serosanguinous 6/14/2019  8:13 AM Odor None 6/14/2019  8:13 AM   Kiera-wound Assessment Pink; Intact 6/14/2019  8:13 AM   Red%Wound Bed 90 4/12/2019  8:41 AM   Yellow%Wound Bed 10 4/12/2019  8:41 AM   Number of days: 589     Wound   serous exudate noted  Errythema - Present    (this wound was originally measured on:)            Measurements shown are from today's visit. Wound 11/02/17 Coccyx #15 blocked stage II coccyx ulcer acq: 10-22-17-Wound Assessment: Red     Wound 11/02/17 Coccyx #15 blocked stage II coccyx ulcer acq: 10-22-17-Wound Assessment: Red  Wound 11/02/17 Coccyx #15 blocked stage II coccyx ulcer acq: 10-22-17-Kiera-wound Assessment: Pink, Intact  Wound 11/02/17 Coccyx #15 blocked stage II coccyx ulcer acq: 10-22-17-Drainage Amount:  Moderate  Wound 11/02/17 Coccyx #15 blocked stage II coccyx ulcer acq: 10-22-17-Drainage Description: Serosanguinous  Wound 11/02/17 Coccyx #15 blocked stage II coccyx ulcer acq: 10-22-17-Odor: None         Wound 11/02/17 Coccyx #15 blocked stage II coccyx ulcer acq: 10-22-17 (Active)   Wound Image   6/14/2019  8:13 AM   Dressing Status Changed;New drainage 5/10/2019  9:09 AM   Dressing Changed Changed/New 5/24/2019  9:06 AM   Dressing/Treatment Dry Dressing 5/24/2019  9:06 AM   Wound Cleansed Rinsed/Irrigated with saline 5/24/2019  9:06 AM   Wound Length (cm) 2 cm 6/14/2019  8:13 AM   Wound Width (cm) 1.7 cm 6/14/2019  8:13 AM   Wound Depth (cm) 1.3 cm 6/14/2019  8:13 AM   Wound Surface Area (cm^2) 3.4 cm^2 6/14/2019  8:13 AM   Change in Wound Size % (l*w) 37.04 6/14/2019  8:13 AM   Wound Volume (cm^3) 4.42 cm^3 6/14/2019  8:13 AM   Wound Healing % -17 6/14/2019  8:13 AM   Post-Procedure Length (cm) 2 cm 6/14/2019  9:27 AM   Post-Procedure Width (cm) 1.8 cm 6/14/2019  9:27 AM   Post-Procedure Depth (cm) 1.3 cm 6/14/2019  9:27 AM   Post-Procedure Surface Area (cm^2) 3.6 cm^2 6/14/2019  9:27 AM   Post-Procedure Volume (cm^3) 4.68 cm^3 6/14/2019  9:27 AM   Distance Tunneling (cm) 2 cm 5/10/2019  8:17 AM Tunneling Position ___ O'Clock 6 5/10/2019  8:17 AM   Undermining Starts ___ O'Clock 3 6/14/2019  8:13 AM   Undermining Ends___ O'Clock 9 6/14/2019  8:13 AM   Undermining Maxium Distance (cm) 1.9 6/14/2019  8:13 AM   Wound Assessment Red 6/14/2019  8:13 AM   Drainage Amount Moderate 6/14/2019  8:13 AM   Drainage Description Serosanguinous 6/14/2019  8:13 AM   Odor None 6/14/2019  8:13 AM   Kiera-wound Assessment Pink; Intact 6/14/2019  8:13 AM   Red%Wound Bed 90 4/12/2019  8:41 AM   Yellow%Wound Bed 10 4/12/2019  8:41 AM   Number of days: 589       Assessment:     Please refer to nursing measurements and assessment regarding wound size pre and post debridement. Wound check - Care provided includes removal of existing dressing and visual inspection    Procedure: Debridement Note: Wound # 15. At 3.4 cm sq. The patient was placed in the prone position. Lidocaine  gauze was applied  at beginning of wound evaluation. An  Excisional Debridement was performed. Using a curette ,  the wound was debrided sharply of all fibrotic, necrotic, and hyperkeratotic tissue, including a layer of surrounding healthy tissue to stimulate epithelization. The wound was excised through the level of the subcutaneous Wound Percentage debrided is 50%. Please refer to Nurses notes for pre and post debridement dimensions. Wound was irrigated with normal saline solution. Bleeding was with a small amount of bleeding, and controlled with pressure. Patient tolerated procedure well and was given proper instruction. Diagnosis: pressure ulcer: Stage IV                      Patient Active Problem List   Diagnosis Code    Decubitus skin ulcer.  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 COLLAGEN and alginate                              3. Discussed appropriate home care of this wound. 4. Patient instructions were given. 5. Follow Up in 1 week(s).                                      Gifty Estrella DO                           6/14/19     12:29 PM

## 2019-06-16 LAB
GRAM STAIN RESULT: NORMAL
WOUND/ABSCESS: NORMAL

## 2019-06-17 LAB
ANAEROBIC CULTURE: ABNORMAL
ANAEROBIC CULTURE: ABNORMAL
ORGANISM: ABNORMAL

## 2019-06-21 ENCOUNTER — HOSPITAL ENCOUNTER (OUTPATIENT)
Dept: WOUND CARE | Age: 39
Discharge: HOME | End: 2019-06-21
Payer: MEDICARE

## 2019-06-21 VITALS
HEIGHT: 55 IN | WEIGHT: 183 LBS | DIASTOLIC BLOOD PRESSURE: 62 MMHG | HEART RATE: 89 BPM | RESPIRATION RATE: 18 BRPM | TEMPERATURE: 98.9 F | BODY MASS INDEX: 42.35 KG/M2 | SYSTOLIC BLOOD PRESSURE: 108 MMHG

## 2019-06-21 DIAGNOSIS — M46.28 OSTEOMYELITIS OF SACRUM (HCC): ICD-10-CM

## 2019-06-21 DIAGNOSIS — L98.422 SKIN ULCER OF BACK WITH FAT LAYER EXPOSED (HCC): ICD-10-CM

## 2019-06-21 PROCEDURE — 11042 DBRDMT SUBQ TIS 1ST 20SQCM/<: CPT

## 2019-06-21 PROCEDURE — 11042 DBRDMT SUBQ TIS 1ST 20SQCM/<: CPT | Performed by: FAMILY MEDICINE

## 2019-06-21 NOTE — PROGRESS NOTES
Follow-Up Wound Progress Note  Maryam Casper  AGE: 37 y.o. GENDER: female  DOD: 1980  TODAY'S DATE:  6/21/19  Subjective:    Gualberto Bassett is a 37 y.o. female who presents today for wound check. Wound Etiology :  pressure ulcer: Stage IV  Wound Location :  midline and sacral and coccyx Pain : {2/10     Abx : Present      Overall Wound Assessment  Wound is has slightly improved.     Size has unchanged    Objective:    /62   Pulse 89   Temp 98.9 °F (37.2 °C) (Oral)   Resp 18   Ht 4' 3\" (1.295 m)   Wt 183 lb (83 kg)   BMI 49.47 kg/m²   Wound 11/02/17 Coccyx #15 blocked stage II coccyx ulcer acq: 10-22-17 (Active)   Wound Image   6/14/2019  8:13 AM   Dressing Status Changed;New drainage 5/10/2019  9:09 AM   Dressing Changed Changed/New 6/21/2019  9:36 AM   Dressing/Treatment Alginate with Ag;Collagen;Dry Dressing 6/21/2019  9:36 AM   Wound Cleansed Rinsed/Irrigated with saline 6/21/2019  9:36 AM   Wound Length (cm) 2.4 cm 6/21/2019  8:23 AM   Wound Width (cm) 1.5 cm 6/21/2019  8:23 AM   Wound Depth (cm) 1.1 cm 6/21/2019  8:23 AM   Wound Surface Area (cm^2) 3.6 cm^2 6/21/2019  8:23 AM   Change in Wound Size % (l*w) 33.33 6/21/2019  8:23 AM   Wound Volume (cm^3) 3.96 cm^3 6/21/2019  8:23 AM   Wound Healing % -5 6/21/2019  8:23 AM   Post-Procedure Length (cm) 2.4 cm 6/21/2019  9:31 AM   Post-Procedure Width (cm) 1.5 cm 6/21/2019  9:31 AM   Post-Procedure Depth (cm) 1.1 cm 6/21/2019  9:31 AM   Post-Procedure Surface Area (cm^2) 3.6 cm^2 6/21/2019  9:31 AM   Post-Procedure Volume (cm^3) 3.96 cm^3 6/21/2019  9:31 AM   Distance Tunneling (cm) 2 cm 5/10/2019  8:17 AM   Tunneling Position ___ O'Clock 6 5/10/2019  8:17 AM   Undermining Starts ___ O'Clock 5 6/21/2019  8:23 AM   Undermining Ends___ O'Clock 6 6/21/2019  8:23 AM   Undermining Maxium Distance (cm) 1.2 6/21/2019  8:23 AM   Wound Assessment Red 6/21/2019  8:23 AM   Drainage Amount Moderate 6/21/2019  8:23 AM   Drainage Description 9:31 AM   Distance Tunneling (cm) 2 cm 5/10/2019  8:17 AM   Tunneling Position ___ O'Clock 6 5/10/2019  8:17 AM   Undermining Starts ___ O'Clock 5 6/21/2019  8:23 AM   Undermining Ends___ O'Clock 6 6/21/2019  8:23 AM   Undermining Maxium Distance (cm) 1.2 6/21/2019  8:23 AM   Wound Assessment Red 6/21/2019  8:23 AM   Drainage Amount Moderate 6/21/2019  8:23 AM   Drainage Description Serosanguinous 6/21/2019  8:23 AM   Odor None 6/21/2019  8:23 AM   Kiera-wound Assessment Excoriated 6/21/2019  8:23 AM   Red%Wound Bed 90 4/12/2019  8:41 AM   Yellow%Wound Bed 10 4/12/2019  8:41 AM   Number of days: 596       Assessment:     Please refer to nursing measurements and assessment regarding wound size pre and post debridement. Wound check - Care provided includes removal of existing dressing and visual inspection    Procedure: Debridement Note: Wound # 15. At 3.6 cm sq. The patient was placed in the prone position. Lidocaine  gauze was applied  at beginning of wound evaluation. An  Excisional Debridement was performed. Using a curette ,  the wound was debrided sharply of all fibrotic, necrotic, and hyperkeratotic tissue, including a layer of surrounding healthy tissue to stimulate epithelization. The wound was excised through the level of the subcutaneous Wound Percentage debrided is 100%. Please refer to Nurses notes for pre and post debridement dimensions. Wound was irrigated with normal saline solution. Bleeding was with a moderate amount of bleeding, and controlled with pressure. Patient tolerated procedure well and was given proper instruction. Diagnosis: pressure ulcer: Stage IV                      Patient Active Problem List   Diagnosis Code    Decubitus skin ulcer.  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 Alginate and Collagen                              3. Discussed appropriate home care of this wound. 4. Patient instructions were given. 5. Follow Up in 1 week(s).                                      Beatris Young DO                           6/21/19     11:01 AM

## 2019-06-28 ENCOUNTER — HOSPITAL ENCOUNTER (OUTPATIENT)
Dept: WOUND CARE | Age: 39
Discharge: HOME | End: 2019-06-28
Payer: MEDICARE

## 2019-06-28 VITALS
SYSTOLIC BLOOD PRESSURE: 110 MMHG | TEMPERATURE: 98.8 F | DIASTOLIC BLOOD PRESSURE: 60 MMHG | RESPIRATION RATE: 16 BRPM | HEART RATE: 82 BPM

## 2019-06-28 DIAGNOSIS — L98.422 SKIN ULCER OF BACK WITH FAT LAYER EXPOSED (HCC): ICD-10-CM

## 2019-06-28 DIAGNOSIS — M46.28 OSTEOMYELITIS OF SACRUM (HCC): ICD-10-CM

## 2019-06-28 PROCEDURE — 11042 DBRDMT SUBQ TIS 1ST 20SQCM/<: CPT

## 2019-06-28 PROCEDURE — 11042 DBRDMT SUBQ TIS 1ST 20SQCM/<: CPT | Performed by: FAMILY MEDICINE

## 2019-06-28 NOTE — PROGRESS NOTES
Follow-Up Wound Progress Note  Maryam Casepr  AGE: 37 y.o. GENDER: female  DOD: 1980  TODAY'S DATE:  6/28/19  Subjective:    Kendrick Olmstead is a 37 y.o. female who presents today for wound check. Wound Etiology :  pressure ulcer: Stage IV  Wound Location :  midline and sacral and coccyx Pain : {0/10     Abx : Absent       Overall Wound Assessment  Wound is has slightly improved. Size has decreased    Objective:    /60   Pulse 82   Temp 98.8 °F (37.1 °C) (Oral)   Resp 16   Wound 11/02/17 Coccyx #15 blocked stage II coccyx ulcer acq: 10-22-17 (Active)   Wound Image   6/14/2019  8:13 AM   Dressing Status Changed;New drainage 5/10/2019  9:09 AM   Dressing Changed Changed/New 6/28/2019 11:38 AM   Dressing/Treatment Alginate with Ag;Collagen;Dry Dressing 6/28/2019 11:38 AM   Wound Cleansed Rinsed/Irrigated with saline 6/21/2019  9:36 AM   Wound Length (cm) 2.4 cm 6/28/2019  8:22 AM   Wound Width (cm) 1 cm 6/28/2019  8:22 AM   Wound Depth (cm) 0.7 cm 6/28/2019  8:22 AM   Wound Surface Area (cm^2) 2.4 cm^2 6/28/2019  8:22 AM   Change in Wound Size % (l*w) 55.56 6/28/2019  8:22 AM   Wound Volume (cm^3) 1.68 cm^3 6/28/2019  8:22 AM   Wound Healing % 56 6/28/2019  8:22 AM   Post-Procedure Length (cm) 2.4 cm 6/28/2019  9:02 AM   Post-Procedure Width (cm) 1 cm 6/28/2019  9:02 AM   Post-Procedure Depth (cm) 0.9 cm 6/28/2019  9:02 AM   Post-Procedure Surface Area (cm^2) 2.4 cm^2 6/28/2019  9:02 AM   Post-Procedure Volume (cm^3) 2.16 cm^3 6/28/2019  9:02 AM   Distance Tunneling (cm) 2 cm 6/28/2019  8:22 AM   Tunneling Position ___ O'Clock 6 6/28/2019  8:22 AM   Undermining Starts ___ O'Clock 5 6/21/2019  8:23 AM   Undermining Ends___ O'Clock 6 6/21/2019  8:23 AM   Undermining Maxium Distance (cm) 1.2 6/21/2019  8:23 AM   Wound Assessment Drainage;Granulation tissue; Red 6/28/2019  8:22 AM   Drainage Amount Moderate 6/28/2019  8:22 AM   Drainage Description Serosanguinous 6/28/2019  8:22 AM   Odor None 6/21/2019  8:23 AM   Kiera-wound Assessment Fragile 6/28/2019  8:22 AM   Red%Wound Bed 100 6/28/2019  8:22 AM   Yellow%Wound Bed 10 4/12/2019  8:41 AM   Number of days: 603     Wound   serous exudate noted  Errythema - Present    (this wound was originally measured on:)            Measurements shown are from today's visit. Wound 11/02/17 Coccyx #15 blocked stage II coccyx ulcer acq: 10-22-17-Wound Assessment: Drainage, Granulation tissue, Red     Wound 11/02/17 Coccyx #15 blocked stage II coccyx ulcer acq: 10-22-17-Wound Assessment: Drainage, Granulation tissue, Red  Wound 11/02/17 Coccyx #15 blocked stage II coccyx ulcer acq: 10-22-17-Kiera-wound Assessment: Fragile  Wound 11/02/17 Coccyx #15 blocked stage II coccyx ulcer acq: 10-22-17-Drainage Amount:  Moderate  Wound 11/02/17 Coccyx #15 blocked stage II coccyx ulcer acq: 10-22-17-Drainage Description: Serosanguinous            Wound 11/02/17 Coccyx #15 blocked stage II coccyx ulcer acq: 10-22-17 (Active)   Wound Image   6/14/2019  8:13 AM   Dressing Status Changed;New drainage 5/10/2019  9:09 AM   Dressing Changed Changed/New 6/28/2019 11:38 AM   Dressing/Treatment Alginate with Ag;Collagen;Dry Dressing 6/28/2019 11:38 AM   Wound Cleansed Rinsed/Irrigated with saline 6/21/2019  9:36 AM   Wound Length (cm) 2.4 cm 6/28/2019  8:22 AM   Wound Width (cm) 1 cm 6/28/2019  8:22 AM   Wound Depth (cm) 0.7 cm 6/28/2019  8:22 AM   Wound Surface Area (cm^2) 2.4 cm^2 6/28/2019  8:22 AM   Change in Wound Size % (l*w) 55.56 6/28/2019  8:22 AM   Wound Volume (cm^3) 1.68 cm^3 6/28/2019  8:22 AM   Wound Healing % 56 6/28/2019  8:22 AM   Post-Procedure Length (cm) 2.4 cm 6/28/2019  9:02 AM   Post-Procedure Width (cm) 1 cm 6/28/2019  9:02 AM   Post-Procedure Depth (cm) 0.9 cm 6/28/2019  9:02 AM   Post-Procedure Surface Area (cm^2) 2.4 cm^2 6/28/2019  9:02 AM   Post-Procedure Volume (cm^3) 2.16 cm^3 6/28/2019  9:02 AM   Distance Tunneling (cm) 2 cm 6/28/2019  8:22 AM   Tunneling Position ___ O'Clock 6 6/28/2019  8:22 AM   Undermining Starts ___ O'Clock 5 6/21/2019  8:23 AM   Undermining Ends___ O'Clock 6 6/21/2019  8:23 AM   Undermining Maxium Distance (cm) 1.2 6/21/2019  8:23 AM   Wound Assessment Drainage;Granulation tissue; Red 6/28/2019  8:22 AM   Drainage Amount Moderate 6/28/2019  8:22 AM   Drainage Description Serosanguinous 6/28/2019  8:22 AM   Odor None 6/21/2019  8:23 AM   Kiera-wound Assessment Fragile 6/28/2019  8:22 AM   Red%Wound Bed 100 6/28/2019  8:22 AM   Yellow%Wound Bed 10 4/12/2019  8:41 AM   Number of days: 603       Assessment:     Please refer to nursing measurements and assessment regarding wound size pre and post debridement. Wound check - Care provided includes removal of existing dressing and visual inspection    Procedure: Debridement Note: Wound # 15. At 2.4 cm sq. The patient was placed in the prone position. Lidocaine  gauze was applied  at beginning of wound evaluation. An  Excisional Debridement was performed. Using a curette ,  the wound was debrided sharply of all fibrotic, necrotic, and hyperkeratotic tissue, including a layer of surrounding healthy tissue to stimulate epithelization. The wound was excised through the level of the subcutaneous Wound Percentage debrided is 100%. Please refer to Nurses notes for pre and post debridement dimensions. Wound was irrigated with normal saline solution. Bleeding was with a small amount of bleeding, and controlled with pressure. Patient tolerated procedure well and was given proper instruction. Diagnosis: pressure ulcer: Stage IV                      Patient Active Problem List   Diagnosis Code    Decubitus skin ulcer.  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 Alginate deep and Collagen superficial                              3. Discussed appropriate home care of this wound. 4. Patient instructions were given. 5. Follow Up in 2 week(s).                                      Josy Allison DO                           6/28/19     12:28 PM

## 2019-07-19 ENCOUNTER — HOSPITAL ENCOUNTER (OUTPATIENT)
Dept: WOUND CARE | Age: 39
Discharge: HOME | End: 2019-07-19
Payer: MEDICARE

## 2019-07-19 VITALS
SYSTOLIC BLOOD PRESSURE: 100 MMHG | TEMPERATURE: 99.1 F | RESPIRATION RATE: 14 BRPM | DIASTOLIC BLOOD PRESSURE: 62 MMHG | HEART RATE: 80 BPM

## 2019-07-19 DIAGNOSIS — M46.28 OSTEOMYELITIS OF SACRUM (HCC): ICD-10-CM

## 2019-07-19 PROCEDURE — 11042 DBRDMT SUBQ TIS 1ST 20SQCM/<: CPT | Performed by: FAMILY MEDICINE

## 2019-07-19 PROCEDURE — 11042 DBRDMT SUBQ TIS 1ST 20SQCM/<: CPT

## 2019-08-02 ENCOUNTER — HOSPITAL ENCOUNTER (OUTPATIENT)
Dept: WOUND CARE | Age: 39
Discharge: HOME | End: 2019-08-02
Payer: MEDICARE

## 2019-08-02 VITALS
WEIGHT: 183 LBS | HEART RATE: 72 BPM | TEMPERATURE: 98.8 F | BODY MASS INDEX: 42.35 KG/M2 | RESPIRATION RATE: 16 BRPM | SYSTOLIC BLOOD PRESSURE: 98 MMHG | HEIGHT: 55 IN | DIASTOLIC BLOOD PRESSURE: 62 MMHG

## 2019-08-02 PROCEDURE — 11042 DBRDMT SUBQ TIS 1ST 20SQCM/<: CPT | Performed by: FAMILY MEDICINE

## 2019-08-02 PROCEDURE — 11042 DBRDMT SUBQ TIS 1ST 20SQCM/<: CPT

## 2019-08-02 NOTE — PROGRESS NOTES
8/2/2019  8:23 AM   Odor None 8/2/2019  8:23 AM   Kiera-wound Assessment Fragile 8/2/2019  8:23 AM   Red%Wound Bed 100 6/28/2019  8:22 AM   Yellow%Wound Bed 10 4/12/2019  8:41 AM   Number of days: 638     Wound   serous exudate noted  Errythema - Present    (this wound was originally measured on:)            Measurements shown are from today's visit. Wound 11/02/17 Coccyx #15 blocked stage II coccyx ulcer acq: 10-22-17-Wound Assessment: Drainage, Red, Pink     Wound 11/02/17 Coccyx #15 blocked stage II coccyx ulcer acq: 10-22-17-Wound Assessment: Drainage, Red, Pink  Wound 11/02/17 Coccyx #15 blocked stage II coccyx ulcer acq: 10-22-17-Kiera-wound Assessment: Fragile  Wound 11/02/17 Coccyx #15 blocked stage II coccyx ulcer acq: 10-22-17-Drainage Amount:  Moderate  Wound 11/02/17 Coccyx #15 blocked stage II coccyx ulcer acq: 10-22-17-Drainage Description: Serosanguinous  Wound 11/02/17 Coccyx #15 blocked stage II coccyx ulcer acq: 10-22-17-Odor: None         Wound 11/02/17 Coccyx #15 blocked stage II coccyx ulcer acq: 10-22-17 (Active)   Wound Image   7/19/2019  8:22 AM   Dressing Status Changed;New drainage 5/10/2019  9:09 AM   Dressing Changed Changed/New 8/2/2019 10:17 AM   Dressing/Treatment Collagen;Hydrofera blue;Dry Dressing 8/2/2019 10:17 AM   Wound Cleansed Rinsed/Irrigated with saline 8/2/2019 10:17 AM   Wound Length (cm) 1.9 cm 8/2/2019  8:23 AM   Wound Width (cm) 0.5 cm 8/2/2019  8:23 AM   Wound Depth (cm) 0.7 cm 8/2/2019  8:23 AM   Wound Surface Area (cm^2) 0.95 cm^2 8/2/2019  8:23 AM   Change in Wound Size % (l*w) 82.41 8/2/2019  8:23 AM   Wound Volume (cm^3) 0.66 cm^3 8/2/2019  8:23 AM   Wound Healing % 83 8/2/2019  8:23 AM   Post-Procedure Length (cm) 1.9 cm 8/2/2019  9:48 AM   Post-Procedure Width (cm) 0.5 cm 8/2/2019  9:48 AM   Post-Procedure Depth (cm) 0.8 cm 8/2/2019  9:48 AM   Post-Procedure Surface Area (cm^2) 0.95 cm^2 8/2/2019  9:48 AM   Post-Procedure Volume (cm^3) 0.76 cm^3 8/2/2019  9:48 AM Distance Tunneling (cm) 1.9 cm 8/2/2019  8:23 AM   Tunneling Position ___ O'Clock 6 8/2/2019  8:23 AM   Undermining Starts ___ O'Clock 5 6/21/2019  8:23 AM   Undermining Ends___ O'Clock 6 6/21/2019  8:23 AM   Undermining Maxium Distance (cm) 1.2 6/21/2019  8:23 AM   Wound Assessment Drainage;Red;Pink 8/2/2019  8:23 AM   Drainage Amount Moderate 8/2/2019  8:23 AM   Drainage Description Serosanguinous 8/2/2019  8:23 AM   Odor None 8/2/2019  8:23 AM   Kiera-wound Assessment Fragile 8/2/2019  8:23 AM   Red%Wound Bed 100 6/28/2019  8:22 AM   Yellow%Wound Bed 10 4/12/2019  8:41 AM   Number of days: 638       Assessment:     Please refer to nursing measurements and assessment regarding wound size pre and post debridement. Wound check - Care provided includes removal of existing dressing and visual inspection    Procedure: Debridement Note: Wound # 15. At 0.95 cm sq. The patient was placed in the right lateral position. Lidocaine  gauze was applied  at beginning of wound evaluation. An  Excisional Debridement was performed. Using a curette ,  the wound was debrided sharply of all fibrotic, necrotic, and hyperkeratotic tissue, including a layer of surrounding healthy tissue to stimulate epithelization. The wound was excised through the level of the subcutaneous Wound Percentage debrided is 100%. Please refer to Nurses notes for pre and post debridement dimensions. Wound was irrigated with normal saline solution. Bleeding was with a moderate amount of bleeding, and controlled with silver nitrate sticks. Patient tolerated procedure well and was given proper instruction. Diagnosis: pressure ulcer: Stage IV                      Patient Active Problem List   Diagnosis Code    Decubitus skin ulcer.  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 Alginate                              3. Discussed appropriate home care of this wound. 4. Patient instructions were given. 5. Follow Up in 1 week(s).                                      Gisella Welsh DO                           8/2/19     10:48 AM

## 2019-08-09 ENCOUNTER — HOSPITAL ENCOUNTER (OUTPATIENT)
Dept: WOUND CARE | Age: 39
Discharge: HOME | End: 2019-08-09
Payer: MEDICARE

## 2019-08-09 VITALS
SYSTOLIC BLOOD PRESSURE: 98 MMHG | TEMPERATURE: 99 F | HEART RATE: 72 BPM | RESPIRATION RATE: 16 BRPM | DIASTOLIC BLOOD PRESSURE: 62 MMHG | BODY MASS INDEX: 42.35 KG/M2 | HEIGHT: 55 IN | WEIGHT: 183 LBS

## 2019-08-09 PROCEDURE — 11042 DBRDMT SUBQ TIS 1ST 20SQCM/<: CPT | Performed by: FAMILY MEDICINE

## 2019-08-09 PROCEDURE — 11042 DBRDMT SUBQ TIS 1ST 20SQCM/<: CPT

## 2019-08-09 NOTE — PROGRESS NOTES
8:21 AM   Odor None 8/9/2019  8:21 AM   Kiera-wound Assessment Fragile; Maceration; White;Pink 8/9/2019  8:21 AM   Red%Wound Bed 100 6/28/2019  8:22 AM   Yellow%Wound Bed 10 4/12/2019  8:41 AM   Number of days: 645     Wound   serous exudate noted  Errythema - Present    (this wound was originally measured on:)            Measurements shown are from today's visit. Wound 11/02/17 Coccyx #15 blocked stage II coccyx ulcer acq: 10-22-17-Wound Assessment: Drainage, Red, Pink     Wound 11/02/17 Coccyx #15 blocked stage II coccyx ulcer acq: 10-22-17-Wound Assessment: Drainage, Red, Pink  Wound 11/02/17 Coccyx #15 blocked stage II coccyx ulcer acq: 10-22-17-Kiera-wound Assessment: Fragile, Maceration, White, Pink  Wound 11/02/17 Coccyx #15 blocked stage II coccyx ulcer acq: 10-22-17-Drainage Amount:  Moderate  Wound 11/02/17 Coccyx #15 blocked stage II coccyx ulcer acq: 10-22-17-Drainage Description: Serosanguinous  Wound 11/02/17 Coccyx #15 blocked stage II coccyx ulcer acq: 10-22-17-Odor: None         Wound 11/02/17 Coccyx #15 blocked stage II coccyx ulcer acq: 10-22-17 (Active)   Wound Image   7/19/2019  8:22 AM   Dressing Status Changed;New drainage 5/10/2019  9:09 AM   Dressing Changed Changed/New 8/9/2019 10:30 AM   Dressing/Treatment Collagen;Hydrofera blue;Dry Dressing 8/9/2019 10:30 AM   Wound Cleansed Rinsed/Irrigated with saline 8/9/2019 10:30 AM   Wound Length (cm) 1.9 cm 8/9/2019  8:21 AM   Wound Width (cm) 0.5 cm 8/9/2019  8:21 AM   Wound Depth (cm) 1.2 cm 8/9/2019  8:21 AM   Wound Surface Area (cm^2) 0.95 cm^2 8/9/2019  8:21 AM   Change in Wound Size % (l*w) 82.41 8/9/2019  8:21 AM   Wound Volume (cm^3) 1.14 cm^3 8/9/2019  8:21 AM   Wound Healing % 70 8/9/2019  8:21 AM   Post-Procedure Length (cm) 1.9 cm 8/9/2019  9:17 AM   Post-Procedure Width (cm) 0.5 cm 8/9/2019  9:17 AM   Post-Procedure Depth (cm) 1.3 cm 8/9/2019  9:17 AM   Post-Procedure Surface Area (cm^2) 0.95 cm^2 8/9/2019  9:17 AM   Post-Procedure left heel ([address] L97.429    Skin ulcer of back with fat layer exposed ([address] L98.422    Osteomyelitis of sacrum ([address] M46.28           Plan:      Treatment & Plan: 1.Excisional Debridement                              2. Alginate and collagen                              3. Discussed appropriate home care of this wound. 4. Patient instructions were given. 5. Follow Up in 1 week(s).                                      Godwin Jasmine DO                           8/9/19     4:10 PM

## 2019-08-23 ENCOUNTER — HOSPITAL ENCOUNTER (OUTPATIENT)
Dept: WOUND CARE | Age: 39
Discharge: HOME | End: 2019-08-23
Payer: MEDICARE

## 2019-08-23 VITALS
WEIGHT: 183 LBS | SYSTOLIC BLOOD PRESSURE: 110 MMHG | RESPIRATION RATE: 16 BRPM | TEMPERATURE: 99.1 F | BODY MASS INDEX: 42.35 KG/M2 | HEART RATE: 60 BPM | DIASTOLIC BLOOD PRESSURE: 62 MMHG | HEIGHT: 55 IN

## 2019-08-23 PROCEDURE — 99213 OFFICE O/P EST LOW 20 MIN: CPT

## 2019-08-23 PROCEDURE — 99213 OFFICE O/P EST LOW 20 MIN: CPT | Performed by: FAMILY MEDICINE

## 2019-09-06 ENCOUNTER — HOSPITAL ENCOUNTER (OUTPATIENT)
Dept: WOUND CARE | Age: 39
Discharge: HOME | End: 2019-09-06
Payer: MEDICARE

## 2019-09-06 VITALS
WEIGHT: 183 LBS | BODY MASS INDEX: 42.35 KG/M2 | HEIGHT: 55 IN | TEMPERATURE: 98.7 F | RESPIRATION RATE: 16 BRPM | SYSTOLIC BLOOD PRESSURE: 100 MMHG | DIASTOLIC BLOOD PRESSURE: 60 MMHG | HEART RATE: 70 BPM

## 2019-09-06 PROCEDURE — 11042 DBRDMT SUBQ TIS 1ST 20SQCM/<: CPT | Performed by: FAMILY MEDICINE

## 2019-09-06 PROCEDURE — 11042 DBRDMT SUBQ TIS 1ST 20SQCM/<: CPT

## 2019-09-06 NOTE — PROGRESS NOTES
AM   Odor None 9/6/2019  8:27 AM   Kiera-wound Assessment Pink; White 9/6/2019  8:27 AM   Red%Wound Bed 100 6/28/2019  8:22 AM   Yellow%Wound Bed 10 4/12/2019  8:41 AM   Number of days: 673     Wound   serous exudate noted  Errythema - Present    (this wound was originally measured on:)            Measurements shown are from today's visit. Wound 11/02/17 Coccyx #15 blocked stage II coccyx ulcer acq: 10-22-17-Wound Assessment: Red, Pink     Wound 11/02/17 Coccyx #15 blocked stage II coccyx ulcer acq: 10-22-17-Wound Assessment: Red, Pink  Wound 11/02/17 Coccyx #15 blocked stage II coccyx ulcer acq: 10-22-17-Kiera-wound Assessment: Pink, White  Wound 11/02/17 Coccyx #15 blocked stage II coccyx ulcer acq: 10-22-17-Drainage Amount:  Moderate  Wound 11/02/17 Coccyx #15 blocked stage II coccyx ulcer acq: 10-22-17-Drainage Description: Serosanguinous  Wound 11/02/17 Coccyx #15 blocked stage II coccyx ulcer acq: 10-22-17-Odor: None         Wound 11/02/17 Coccyx #15 blocked stage II coccyx ulcer acq: 10-22-17 (Active)   Wound Image   6/14/2019  8:13 AM   Dressing Status Changed;New drainage 5/10/2019  9:09 AM   Dressing Changed Changed/New 8/23/2019 11:49 AM   Dressing/Treatment Collagen;Hydrofera blue;Dry Dressing 8/23/2019 11:49 AM   Wound Cleansed Rinsed/Irrigated with saline 8/23/2019 11:49 AM   Wound Length (cm) 3 cm 9/6/2019  8:27 AM   Wound Width (cm) 2.2 cm 9/6/2019  8:27 AM   Wound Depth (cm) 1 cm 9/6/2019  8:27 AM   Wound Surface Area (cm^2) 6.6 cm^2 9/6/2019  8:27 AM   Change in Wound Size % (l*w) -22.22 9/6/2019  8:27 AM   Wound Volume (cm^3) 6.6 cm^3 9/6/2019  8:27 AM   Wound Healing % -75 9/6/2019  8:27 AM   Post-Procedure Length (cm) 3 cm 9/6/2019  9:45 AM   Post-Procedure Width (cm) 2.3 cm 9/6/2019  9:45 AM   Post-Procedure Depth (cm) 1 cm 9/6/2019  9:45 AM   Post-Procedure Surface Area (cm^2) 6.9 cm^2 9/6/2019  9:45 AM   Post-Procedure Volume (cm^3) 6.9 cm^3 9/6/2019  9:45 AM   Distance Tunneling (cm) 1.9 cm

## 2019-09-20 ENCOUNTER — HOSPITAL ENCOUNTER (OUTPATIENT)
Dept: WOUND CARE | Age: 39
Discharge: HOME | End: 2019-09-20
Payer: MEDICARE

## 2019-09-20 ENCOUNTER — HOSPITAL ENCOUNTER (OUTPATIENT)
Age: 39
Discharge: HOME | End: 2019-09-20
Payer: MEDICARE

## 2019-09-20 LAB
ALBUMIN SERPL-MCNC: 3.9 G/DL (ref 3.5–5.2)
ALP BLD-CCNC: 85 U/L (ref 35–104)
ALT SERPL-CCNC: 10 U/L (ref 0–32)
ANION GAP SERPL CALCULATED.3IONS-SCNC: 12 MMOL/L (ref 7–16)
AST SERPL-CCNC: 10 U/L (ref 0–31)
BILIRUB SERPL-MCNC: 0.3 MG/DL (ref 0–1.2)
BUN BLDV-MCNC: 9 MG/DL (ref 6–20)
CALCIUM SERPL-MCNC: 8.9 MG/DL (ref 8.6–10.2)
CHLORIDE BLD-SCNC: 105 MMOL/L (ref 98–107)
CHOLESTEROL, TOTAL: 187 MG/DL (ref 0–199)
CO2: 22 MMOL/L (ref 22–29)
CREAT SERPL-MCNC: 0.5 MG/DL (ref 0.5–1)
CREATININE URINE: 59 MG/DL (ref 29–226)
GFR AFRICAN AMERICAN: >60
GFR NON-AFRICAN AMERICAN: >60 ML/MIN/1.73
GLUCOSE BLD-MCNC: 105 MG/DL (ref 74–99)
HDLC SERPL-MCNC: 50 MG/DL
LDL CHOLESTEROL CALCULATED: 118 MG/DL (ref 0–99)
MICROALBUMIN UR-MCNC: 62.7 MG/L
MICROALBUMIN/CREAT UR-RTO: 106.3 (ref 0–30)
POTASSIUM SERPL-SCNC: 3.7 MMOL/L (ref 3.5–5)
SODIUM BLD-SCNC: 139 MMOL/L (ref 132–146)
TOTAL CK: 75 U/L (ref 20–180)
TOTAL PROTEIN: 7.9 G/DL (ref 6.4–8.3)
TRIGL SERPL-MCNC: 94 MG/DL (ref 0–149)
VITAMIN D 25-HYDROXY: 27 NG/ML (ref 30–100)
VLDLC SERPL CALC-MCNC: 19 MG/DL

## 2019-09-20 PROCEDURE — 82550 ASSAY OF CK (CPK): CPT

## 2019-09-20 PROCEDURE — 80053 COMPREHEN METABOLIC PANEL: CPT

## 2019-09-20 PROCEDURE — 82044 UR ALBUMIN SEMIQUANTITATIVE: CPT

## 2019-09-20 PROCEDURE — 82306 VITAMIN D 25 HYDROXY: CPT

## 2019-09-20 PROCEDURE — 82570 ASSAY OF URINE CREATININE: CPT

## 2019-09-20 PROCEDURE — 80061 LIPID PANEL: CPT

## 2019-10-04 ENCOUNTER — HOSPITAL ENCOUNTER (OUTPATIENT)
Dept: WOUND CARE | Age: 39
Discharge: HOME | End: 2019-10-04
Payer: MEDICARE

## 2019-10-04 VITALS
TEMPERATURE: 98.7 F | HEART RATE: 82 BPM | SYSTOLIC BLOOD PRESSURE: 100 MMHG | DIASTOLIC BLOOD PRESSURE: 64 MMHG | RESPIRATION RATE: 16 BRPM

## 2019-10-04 PROCEDURE — 11042 DBRDMT SUBQ TIS 1ST 20SQCM/<: CPT | Performed by: FAMILY MEDICINE

## 2019-10-04 PROCEDURE — 87070 CULTURE OTHR SPECIMN AEROBIC: CPT

## 2019-10-04 PROCEDURE — 87075 CULTR BACTERIA EXCEPT BLOOD: CPT

## 2019-10-04 PROCEDURE — 11042 DBRDMT SUBQ TIS 1ST 20SQCM/<: CPT

## 2019-10-06 LAB — WOUND/ABSCESS: NORMAL

## 2019-10-10 LAB
ANAEROBIC CULTURE: ABNORMAL
ORGANISM: ABNORMAL

## 2019-10-11 ENCOUNTER — HOSPITAL ENCOUNTER (OUTPATIENT)
Dept: WOUND CARE | Age: 39
Discharge: HOME | End: 2019-10-11
Payer: MEDICARE

## 2019-10-11 VITALS
RESPIRATION RATE: 16 BRPM | SYSTOLIC BLOOD PRESSURE: 120 MMHG | DIASTOLIC BLOOD PRESSURE: 80 MMHG | HEART RATE: 88 BPM | TEMPERATURE: 99 F

## 2019-10-11 PROCEDURE — 99213 OFFICE O/P EST LOW 20 MIN: CPT | Performed by: FAMILY MEDICINE

## 2019-10-11 PROCEDURE — 99212 OFFICE O/P EST SF 10 MIN: CPT

## 2019-11-01 ENCOUNTER — HOSPITAL ENCOUNTER (OUTPATIENT)
Dept: WOUND CARE | Age: 39
Discharge: HOME | End: 2019-11-01
Payer: MEDICARE

## 2019-11-01 VITALS
WEIGHT: 183 LBS | SYSTOLIC BLOOD PRESSURE: 108 MMHG | HEIGHT: 55 IN | BODY MASS INDEX: 42.35 KG/M2 | RESPIRATION RATE: 16 BRPM | DIASTOLIC BLOOD PRESSURE: 72 MMHG

## 2019-11-01 PROCEDURE — 11042 DBRDMT SUBQ TIS 1ST 20SQCM/<: CPT | Performed by: FAMILY MEDICINE

## 2019-11-01 PROCEDURE — 11042 DBRDMT SUBQ TIS 1ST 20SQCM/<: CPT

## 2019-11-22 ENCOUNTER — HOSPITAL ENCOUNTER (OUTPATIENT)
Dept: WOUND CARE | Age: 39
Discharge: HOME | End: 2019-11-22
Payer: MEDICARE

## 2019-11-22 VITALS
RESPIRATION RATE: 14 BRPM | TEMPERATURE: 98.7 F | DIASTOLIC BLOOD PRESSURE: 78 MMHG | SYSTOLIC BLOOD PRESSURE: 110 MMHG | HEART RATE: 80 BPM

## 2019-11-22 DIAGNOSIS — M46.28 OSTEOMYELITIS OF SACRUM (HCC): ICD-10-CM

## 2019-11-22 PROCEDURE — 87075 CULTR BACTERIA EXCEPT BLOOD: CPT

## 2019-11-22 PROCEDURE — 11042 DBRDMT SUBQ TIS 1ST 20SQCM/<: CPT | Performed by: FAMILY MEDICINE

## 2019-11-22 PROCEDURE — 87070 CULTURE OTHR SPECIMN AEROBIC: CPT

## 2019-11-22 PROCEDURE — 11042 DBRDMT SUBQ TIS 1ST 20SQCM/<: CPT

## 2019-11-24 LAB
ANAEROBIC CULTURE: NORMAL
ORGANISM: ABNORMAL
ORGANISM: ABNORMAL
WOUND/ABSCESS: ABNORMAL
WOUND/ABSCESS: ABNORMAL

## 2019-12-13 ENCOUNTER — HOSPITAL ENCOUNTER (OUTPATIENT)
Dept: WOUND CARE | Age: 39
Discharge: HOME | End: 2019-12-13
Payer: MEDICARE

## 2019-12-13 VITALS
RESPIRATION RATE: 16 BRPM | HEART RATE: 84 BPM | TEMPERATURE: 98.7 F | DIASTOLIC BLOOD PRESSURE: 62 MMHG | SYSTOLIC BLOOD PRESSURE: 104 MMHG

## 2019-12-13 PROCEDURE — 99213 OFFICE O/P EST LOW 20 MIN: CPT | Performed by: FAMILY MEDICINE

## 2019-12-13 PROCEDURE — 99213 OFFICE O/P EST LOW 20 MIN: CPT

## 2020-01-03 ENCOUNTER — HOSPITAL ENCOUNTER (OUTPATIENT)
Dept: WOUND CARE | Age: 40
Discharge: HOME | End: 2020-01-03
Payer: MEDICARE

## 2020-01-03 VITALS
HEIGHT: 55 IN | SYSTOLIC BLOOD PRESSURE: 120 MMHG | TEMPERATURE: 98.1 F | BODY MASS INDEX: 42.35 KG/M2 | RESPIRATION RATE: 16 BRPM | DIASTOLIC BLOOD PRESSURE: 84 MMHG | WEIGHT: 183 LBS | HEART RATE: 74 BPM

## 2020-01-03 PROCEDURE — 11042 DBRDMT SUBQ TIS 1ST 20SQCM/<: CPT | Performed by: FAMILY MEDICINE

## 2020-01-03 PROCEDURE — 11042 DBRDMT SUBQ TIS 1ST 20SQCM/<: CPT

## 2020-01-24 ENCOUNTER — HOSPITAL ENCOUNTER (OUTPATIENT)
Dept: WOUND CARE | Age: 40
Discharge: HOME | End: 2020-01-24
Payer: MEDICARE

## 2020-01-24 VITALS
RESPIRATION RATE: 16 BRPM | TEMPERATURE: 98.8 F | HEART RATE: 88 BPM | SYSTOLIC BLOOD PRESSURE: 118 MMHG | DIASTOLIC BLOOD PRESSURE: 70 MMHG

## 2020-01-24 PROBLEM — L89.154 PRESSURE INJURY OF SACRAL REGION, STAGE 4 (HCC): Status: ACTIVE | Noted: 2020-01-24

## 2020-01-24 PROCEDURE — 99213 OFFICE O/P EST LOW 20 MIN: CPT

## 2020-01-24 PROCEDURE — 99213 OFFICE O/P EST LOW 20 MIN: CPT | Performed by: FAMILY MEDICINE

## 2020-01-24 NOTE — PROGRESS NOTES
Follow-Up Wound Progress Note    Maryam Casper  AGE: 37 y.o. GENDER: female  DOD: 1980  TODAY'S DATE:  1/24/20  Subjective:    Efren Duke is a 37 y.o. female who presents today for wound check. Wound Etiology :  pressure ulcer: Stage IV  Wound Location :  midline and sacral and coccyx   Pain : {0/10     Abx : Absent       Overall Wound Assessment  Wound is has slightly improved.     Size has decreased    Objective:    /70   Pulse 88   Temp 98.8 °F (37.1 °C) (Oral)   Resp 16     Wound   serous exudate noted  Errythema - Present    Wound 11/02/17 Coccyx #15 blocked stage II coccyx ulcer acq: 10-22-17 (Active)   Wound Image   1/24/2020  8:16 AM   Dressing Status Changed;New drainage 5/10/2019  9:09 AM   Dressing Changed Changed/New 1/24/2020  9:43 AM   Dressing/Treatment Collagen;Alginate;ABD 1/24/2020  9:43 AM   Wound Cleansed Rinsed/Irrigated with saline 1/24/2020  9:43 AM   Wound Length (cm) 2 cm 1/24/2020  8:16 AM   Wound Width (cm) 1.5 cm 1/24/2020  8:16 AM   Wound Depth (cm) 2.6 cm 1/24/2020  8:16 AM   Wound Surface Area (cm^2) 3 cm^2 1/24/2020  8:16 AM   Change in Wound Size % (l*w) 44.44 1/24/2020  8:16 AM   Wound Volume (cm^3) 7.8 cm^3 1/24/2020  8:16 AM   Wound Healing % -106 1/24/2020  8:16 AM   Post-Procedure Length (cm) 0.4 cm 1/3/2020  9:26 AM   Post-Procedure Width (cm) 0.7 cm 1/3/2020  9:26 AM   Post-Procedure Depth (cm) 1.3 cm 1/3/2020  9:26 AM   Post-Procedure Surface Area (cm^2) 0.28 cm^2 1/3/2020  9:26 AM   Post-Procedure Volume (cm^3) 0.36 cm^3 1/3/2020  9:26 AM   Distance Tunneling (cm) 1.9 cm 8/2/2019  8:23 AM   Tunneling Position ___ O'Clock 6 8/2/2019  8:23 AM   Undermining Starts ___ O'Clock 3 8/23/2019  8:16 AM   Undermining Ends___ O'Clock 6 8/23/2019  8:16 AM   Undermining Maxium Distance (cm) 2.4 8/23/2019  8:16 AM   Wound Assessment White;Soham 1/24/2020  8:16 AM   Drainage Amount Small 1/24/2020  8:16 AM   Drainage Description Serosanguinous 1/24/2020  8:16 AM   Odor None 1/24/2020  8:16 AM   Kiera-wound Assessment Maceration 1/24/2020  8:16 AM   Red%Wound Bed 100 6/28/2019  8:22 AM   Yellow%Wound Bed 10 4/12/2019  8:41 AM   Culture Taken Yes 10/4/2019  9:17 AM   Number of days: 813            Assessment:     Please refer to nursing measurements and assessment regarding wound size pre and post debridement. Wound check - Care provided includes removal of existing dressing and visual inspection    Procedure: Debridement Note: None  Diagnosis: pressure ulcer: Stage IV                    Active Problems:    Spina bifida ([address]    Paraplegic immobility syndrome    Osteomyelitis of sacrum (HCC)    Pressure injury of sacral region, stage 4 ([facility])  Resolved Problems:    * No resolved hospital problems. *          Plan:      Treatment & Plan: 1.Non operative wound management                              2. Collagen in and alginate on top                              3. Discussed appropriate home care of this wound. 4. Patient instructions were given. 5. Follow Up in 2 week(s).                                    Ruslan Taylor DO   1/24/20     12:09 PM

## 2020-02-14 ENCOUNTER — HOSPITAL ENCOUNTER (OUTPATIENT)
Dept: WOUND CARE | Age: 40
Discharge: HOME | End: 2020-02-14
Payer: MEDICARE

## 2020-02-21 ENCOUNTER — HOSPITAL ENCOUNTER (OUTPATIENT)
Dept: WOUND CARE | Age: 40
Discharge: HOME | End: 2020-02-21
Payer: MEDICARE

## 2020-02-21 VITALS
SYSTOLIC BLOOD PRESSURE: 154 MMHG | RESPIRATION RATE: 16 BRPM | TEMPERATURE: 99 F | HEART RATE: 80 BPM | DIASTOLIC BLOOD PRESSURE: 60 MMHG

## 2020-02-21 PROCEDURE — 99213 OFFICE O/P EST LOW 20 MIN: CPT | Performed by: FAMILY MEDICINE

## 2020-02-21 PROCEDURE — 99213 OFFICE O/P EST LOW 20 MIN: CPT

## 2020-02-21 NOTE — PROGRESS NOTES
Follow-Up Wound Progress Note    Maryam Casper  AGE: 37 y.o. GENDER: female  DOD: 1980  TODAY'S DATE:  2/21/20  Subjective:    Mely Nguyen is a 37 y.o. female who presents today for wound check. Wound Etiology :  pressure ulcer: Stage IV  Wound Location :  midline and sacral as well as the right SI area Pain : {0/10     Abx : Absent       Overall Wound Assessment  Wound is has slightly improved.     Size has decreased    Objective:    BP (!) 154/60   Pulse 80   Temp 99 °F (37.2 °C) (Oral)   Resp 16     Wound   serous exudate noted  Errythema - Present    Wound 11/02/17 Coccyx #15 blocked stage II coccyx ulcer acq: 10-22-17 (Active)   Wound Image   1/24/2020  8:16 AM   Dressing Status Changed;New drainage 5/10/2019  9:09 AM   Dressing Changed Changed/New 2/21/2020  9:22 AM   Dressing/Treatment Collagen;Alginate;ABD 2/21/2020  9:22 AM   Wound Cleansed Rinsed/Irrigated with saline 2/21/2020  9:22 AM   Wound Length (cm) 1.6 cm 2/21/2020  8:17 AM   Wound Width (cm) 0.9 cm 2/21/2020  8:17 AM   Wound Depth (cm) 0.6 cm 2/21/2020  8:17 AM   Wound Surface Area (cm^2) 1.44 cm^2 2/21/2020  8:17 AM   Change in Wound Size % (l*w) 73.33 2/21/2020  8:17 AM   Wound Volume (cm^3) 0.86 cm^3 2/21/2020  8:17 AM   Wound Healing % 77 2/21/2020  8:17 AM   Post-Procedure Length (cm) 0.4 cm 1/3/2020  9:26 AM   Post-Procedure Width (cm) 0.7 cm 1/3/2020  9:26 AM   Post-Procedure Depth (cm) 1.3 cm 1/3/2020  9:26 AM   Post-Procedure Surface Area (cm^2) 0.28 cm^2 1/3/2020  9:26 AM   Post-Procedure Volume (cm^3) 0.36 cm^3 1/3/2020  9:26 AM   Distance Tunneling (cm) 1.9 cm 8/2/2019  8:23 AM   Tunneling Position ___ O'Clock 6 8/2/2019  8:23 AM   Undermining Starts ___ O'Clock 3 8/23/2019  8:16 AM   Undermining Ends___ O'Clock 6 8/23/2019  8:16 AM   Undermining Maxium Distance (cm) 2.4 8/23/2019  8:16 AM   Wound Assessment Pink 2/21/2020  8:17 AM   Drainage Amount Moderate 2/21/2020  8:17 AM   Drainage Description

## 2020-03-27 ENCOUNTER — HOSPITAL ENCOUNTER (OUTPATIENT)
Dept: WOUND CARE | Age: 40
Discharge: HOME | End: 2020-03-27
Payer: MEDICARE

## 2020-05-01 ENCOUNTER — HOSPITAL ENCOUNTER (OUTPATIENT)
Age: 40
Discharge: HOME | End: 2020-05-01
Payer: MEDICARE

## 2020-05-01 LAB
ALBUMIN SERPL-MCNC: 3.8 G/DL (ref 3.5–5.2)
ALP BLD-CCNC: 92 U/L (ref 35–104)
ALT SERPL-CCNC: 12 U/L (ref 0–32)
ANION GAP SERPL CALCULATED.3IONS-SCNC: 13 MMOL/L (ref 7–16)
AST SERPL-CCNC: 17 U/L (ref 0–31)
BILIRUB SERPL-MCNC: 0.4 MG/DL (ref 0–1.2)
BUN BLDV-MCNC: 8 MG/DL (ref 6–20)
CALCIUM SERPL-MCNC: 8.8 MG/DL (ref 8.6–10.2)
CHLORIDE BLD-SCNC: 105 MMOL/L (ref 98–107)
CHOLESTEROL, FASTING: 185 MG/DL (ref 0–199)
CO2: 20 MMOL/L (ref 22–29)
CREAT SERPL-MCNC: 0.4 MG/DL (ref 0.5–1)
CREATININE URINE: 39 MG/DL (ref 29–226)
GFR AFRICAN AMERICAN: >60
GFR NON-AFRICAN AMERICAN: >60 ML/MIN/1.73
GLUCOSE FASTING: 121 MG/DL (ref 74–99)
HDLC SERPL-MCNC: 52 MG/DL
LDL CHOLESTEROL CALCULATED: 117 MG/DL (ref 0–99)
MICROALBUMIN UR-MCNC: 23.5 MG/L
MICROALBUMIN/CREAT UR-RTO: 60.3 (ref 0–30)
POTASSIUM SERPL-SCNC: 4.3 MMOL/L (ref 3.5–5)
SODIUM BLD-SCNC: 138 MMOL/L (ref 132–146)
TOTAL CK: 95 U/L (ref 20–180)
TOTAL PROTEIN: 7.6 G/DL (ref 6.4–8.3)
TRIGLYCERIDE, FASTING: 82 MG/DL (ref 0–149)
VITAMIN D 25-HYDROXY: 27 NG/ML (ref 30–100)
VLDLC SERPL CALC-MCNC: 16 MG/DL

## 2020-05-01 PROCEDURE — 82570 ASSAY OF URINE CREATININE: CPT

## 2020-05-01 PROCEDURE — 82306 VITAMIN D 25 HYDROXY: CPT

## 2020-05-01 PROCEDURE — 82550 ASSAY OF CK (CPK): CPT

## 2020-05-01 PROCEDURE — 82044 UR ALBUMIN SEMIQUANTITATIVE: CPT

## 2020-05-01 PROCEDURE — 80061 LIPID PANEL: CPT

## 2020-05-01 PROCEDURE — 36415 COLL VENOUS BLD VENIPUNCTURE: CPT

## 2020-05-01 PROCEDURE — 80053 COMPREHEN METABOLIC PANEL: CPT

## 2020-06-05 ENCOUNTER — HOSPITAL ENCOUNTER (OUTPATIENT)
Dept: GENERAL RADIOLOGY | Age: 40
Discharge: HOME | End: 2020-06-07
Payer: MEDICARE

## 2020-06-05 ENCOUNTER — HOSPITAL ENCOUNTER (OUTPATIENT)
Age: 40
Discharge: HOME | End: 2020-06-07
Payer: MEDICARE

## 2020-06-05 PROCEDURE — 73030 X-RAY EXAM OF SHOULDER: CPT

## 2020-06-12 ENCOUNTER — HOSPITAL ENCOUNTER (OUTPATIENT)
Dept: WOUND CARE | Age: 40
Discharge: HOME | End: 2020-06-12
Payer: MEDICARE

## 2020-06-12 VITALS
WEIGHT: 185 LBS | BODY MASS INDEX: 50.01 KG/M2 | SYSTOLIC BLOOD PRESSURE: 110 MMHG | RESPIRATION RATE: 18 BRPM | HEART RATE: 68 BPM | DIASTOLIC BLOOD PRESSURE: 72 MMHG | TEMPERATURE: 97.5 F

## 2020-06-12 PROCEDURE — 99213 OFFICE O/P EST LOW 20 MIN: CPT | Performed by: FAMILY MEDICINE

## 2020-06-12 PROCEDURE — 99213 OFFICE O/P EST LOW 20 MIN: CPT

## 2020-06-12 RX ORDER — ATORVASTATIN CALCIUM 10 MG/1
10 TABLET, FILM COATED ORAL DAILY
COMMUNITY

## 2020-06-12 ASSESSMENT — PAIN SCALES - GENERAL: PAINLEVEL_OUTOF10: 0

## 2020-06-15 ENCOUNTER — OFFICE VISIT (OUTPATIENT)
Dept: ORTHOPEDIC SURGERY | Age: 40
End: 2020-06-15
Payer: MEDICARE

## 2020-06-15 VITALS — BODY MASS INDEX: 50.01 KG/M2 | WEIGHT: 185 LBS | TEMPERATURE: 98 F

## 2020-06-15 PROCEDURE — 1036F TOBACCO NON-USER: CPT | Performed by: ORTHOPAEDIC SURGERY

## 2020-06-15 PROCEDURE — G8417 CALC BMI ABV UP PARAM F/U: HCPCS | Performed by: ORTHOPAEDIC SURGERY

## 2020-06-15 PROCEDURE — G8427 DOCREV CUR MEDS BY ELIG CLIN: HCPCS | Performed by: ORTHOPAEDIC SURGERY

## 2020-06-15 PROCEDURE — 99203 OFFICE O/P NEW LOW 30 MIN: CPT | Performed by: ORTHOPAEDIC SURGERY

## 2020-06-15 NOTE — PROGRESS NOTES
None     Active member of club or organization: None     Attends meetings of clubs or organizations: None     Relationship status: None    Intimate partner violence     Fear of current or ex partner: None     Emotionally abused: None     Physically abused: None     Forced sexual activity: None   Other Topics Concern    None   Social History Narrative    None       Family History   Problem Relation Age of Onset    High Blood Pressure Father     High Blood Pressure Brother          Review of Systems:   As follows except as previously noted in HPI:  Constitutional: Negative for chills, diaphoresis,  fever   Respiratory: Negative for cough, shortness of breath and wheezing. Cardiovascular: Negative for chest pain and palpitations. Neurological: Negative for dizziness, syncope,   GI / : abdominal pain or cramping  Musculoskeletal: see HPI       Objective:   Physical Exam   Constitutional: Oriented to person, place, and time. and appears well-developed and well-nourished. :   Head: Normocephalic and atraumatic. Neck: Neck supple. Eyes: EOM are normal.   Pulmonary/Chest: Effort normal.  No respiratory distress, no wheezes. Neurological: Alert and oriented to person  Skin: Skin is warm and dry. Kathy Rubin DO    6/15/20  11:30 AM    All reasonable efforts have been made to minimize the risk of errors that may occur in the use of voice recognition and other electronic means of charting.

## 2020-07-14 ENCOUNTER — HOSPITAL ENCOUNTER (OUTPATIENT)
Age: 40
Discharge: HOME | End: 2020-07-14
Payer: MEDICARE

## 2020-07-14 LAB — GLUCOSE FASTING: 118 MG/DL (ref 74–99)

## 2020-07-14 PROCEDURE — 36415 COLL VENOUS BLD VENIPUNCTURE: CPT

## 2020-07-14 PROCEDURE — 82947 ASSAY GLUCOSE BLOOD QUANT: CPT

## 2020-08-26 ENCOUNTER — HOSPITAL ENCOUNTER (OUTPATIENT)
Age: 40
Discharge: HOME | End: 2020-08-26
Payer: MEDICARE

## 2020-08-26 LAB — GLUCOSE BLD-MCNC: 116 MG/DL (ref 74–99)

## 2020-08-26 PROCEDURE — 36415 COLL VENOUS BLD VENIPUNCTURE: CPT

## 2020-08-26 PROCEDURE — 82947 ASSAY GLUCOSE BLOOD QUANT: CPT

## 2021-04-08 ENCOUNTER — APPOINTMENT (OUTPATIENT)
Dept: GENERAL RADIOLOGY | Age: 41
End: 2021-04-08
Payer: MEDICARE

## 2021-04-08 ENCOUNTER — HOSPITAL ENCOUNTER (EMERGENCY)
Age: 41
Discharge: HOME | End: 2021-04-08
Attending: EMERGENCY MEDICINE
Payer: MEDICARE

## 2021-04-08 VITALS
OXYGEN SATURATION: 100 % | HEART RATE: 74 BPM | DIASTOLIC BLOOD PRESSURE: 78 MMHG | RESPIRATION RATE: 18 BRPM | SYSTOLIC BLOOD PRESSURE: 142 MMHG | HEIGHT: 55 IN | TEMPERATURE: 98.6 F | BODY MASS INDEX: 43.05 KG/M2 | WEIGHT: 186 LBS

## 2021-04-08 DIAGNOSIS — N39.0 URINARY TRACT INFECTION WITH HEMATURIA, SITE UNSPECIFIED: Primary | ICD-10-CM

## 2021-04-08 DIAGNOSIS — R31.9 URINARY TRACT INFECTION WITH HEMATURIA, SITE UNSPECIFIED: Primary | ICD-10-CM

## 2021-04-08 LAB
ALBUMIN SERPL-MCNC: 4.1 G/DL (ref 3.5–5.2)
ALP BLD-CCNC: 92 U/L (ref 35–104)
ALT SERPL-CCNC: 11 U/L (ref 0–32)
ANION GAP SERPL CALCULATED.3IONS-SCNC: 10 MMOL/L (ref 7–16)
AST SERPL-CCNC: 12 U/L (ref 0–31)
BACTERIA: ABNORMAL /HPF
BASOPHILS ABSOLUTE: 0.05 E9/L (ref 0–0.2)
BASOPHILS RELATIVE PERCENT: 0.6 % (ref 0–2)
BILIRUB SERPL-MCNC: 0.3 MG/DL (ref 0–1.2)
BILIRUBIN URINE: NEGATIVE
BLOOD, URINE: ABNORMAL
BUN BLDV-MCNC: 9 MG/DL (ref 6–20)
CALCIUM SERPL-MCNC: 8.6 MG/DL (ref 8.6–10.2)
CHLORIDE BLD-SCNC: 106 MMOL/L (ref 98–107)
CLARITY: ABNORMAL
CO2: 24 MMOL/L (ref 22–29)
COLOR: YELLOW
CREAT SERPL-MCNC: 0.5 MG/DL (ref 0.5–1)
EOSINOPHILS ABSOLUTE: 0.21 E9/L (ref 0.05–0.5)
EOSINOPHILS RELATIVE PERCENT: 2.6 % (ref 0–6)
EPITHELIAL CELLS, UA: ABNORMAL /HPF
GFR AFRICAN AMERICAN: >60
GFR NON-AFRICAN AMERICAN: >60 ML/MIN/1.73
GLUCOSE BLD-MCNC: 128 MG/DL (ref 74–99)
GLUCOSE URINE: NEGATIVE MG/DL
HCT VFR BLD CALC: 44.6 % (ref 34–48)
HEMOGLOBIN: 14.1 G/DL (ref 11.5–15.5)
IMMATURE GRANULOCYTES #: 0.03 E9/L
IMMATURE GRANULOCYTES %: 0.4 % (ref 0–5)
KETONES, URINE: NEGATIVE MG/DL
LEUKOCYTE ESTERASE, URINE: ABNORMAL
LYMPHOCYTES ABSOLUTE: 1.2 E9/L (ref 1.5–4)
LYMPHOCYTES RELATIVE PERCENT: 14.7 % (ref 20–42)
MCH RBC QN AUTO: 26.4 PG (ref 26–35)
MCHC RBC AUTO-ENTMCNC: 31.6 % (ref 32–34.5)
MCV RBC AUTO: 83.5 FL (ref 80–99.9)
MONOCYTES ABSOLUTE: 0.39 E9/L (ref 0.1–0.95)
MONOCYTES RELATIVE PERCENT: 4.8 % (ref 2–12)
NEUTROPHILS ABSOLUTE: 6.29 E9/L (ref 1.8–7.3)
NEUTROPHILS RELATIVE PERCENT: 76.9 % (ref 43–80)
NITRITE, URINE: NEGATIVE
PDW BLD-RTO: 14.5 FL (ref 11.5–15)
PH UA: 7 (ref 5–9)
PLATELET # BLD: 270 E9/L (ref 130–450)
PMV BLD AUTO: 10.9 FL (ref 7–12)
POTASSIUM REFLEX MAGNESIUM: 4 MMOL/L (ref 3.5–5)
PROTEIN UA: 30 MG/DL
RBC # BLD: 5.34 E12/L (ref 3.5–5.5)
RBC UA: ABNORMAL /HPF (ref 0–2)
SODIUM BLD-SCNC: 140 MMOL/L (ref 132–146)
SPECIFIC GRAVITY UA: 1.01 (ref 1–1.03)
TOTAL PROTEIN: 7.2 G/DL (ref 6.4–8.3)
UROBILINOGEN, URINE: 0.2 E.U./DL
WBC # BLD: 8.2 E9/L (ref 4.5–11.5)
WBC UA: ABNORMAL /HPF (ref 0–5)

## 2021-04-08 PROCEDURE — 81001 URINALYSIS AUTO W/SCOPE: CPT

## 2021-04-08 PROCEDURE — 80053 COMPREHEN METABOLIC PANEL: CPT

## 2021-04-08 PROCEDURE — 2580000003 HC RX 258: Performed by: EMERGENCY MEDICINE

## 2021-04-08 PROCEDURE — 85025 COMPLETE CBC W/AUTO DIFF WBC: CPT

## 2021-04-08 PROCEDURE — 74022 RADEX COMPL AQT ABD SERIES: CPT

## 2021-04-08 PROCEDURE — 99283 EMERGENCY DEPT VISIT LOW MDM: CPT

## 2021-04-08 RX ORDER — NITROFURANTOIN 25; 75 MG/1; MG/1
100 CAPSULE ORAL 2 TIMES DAILY
Qty: 20 CAPSULE | Refills: 0 | Status: SHIPPED | OUTPATIENT
Start: 2021-04-08 | End: 2021-04-18

## 2021-04-08 RX ORDER — 0.9 % SODIUM CHLORIDE 0.9 %
1000 INTRAVENOUS SOLUTION INTRAVENOUS ONCE
Status: COMPLETED | OUTPATIENT
Start: 2021-04-08 | End: 2021-04-08

## 2021-04-08 RX ADMIN — SODIUM CHLORIDE 1000 ML: 9 INJECTION, SOLUTION INTRAVENOUS at 07:32

## 2021-04-08 NOTE — ED PROVIDER NOTES
HPI:  4/8/21,   Time: 7:18 AM EDT         Dahiana Pratt is a 41 y.o. female presenting to the ED for decreased urine output, beginning 1 day ago. The complaint has been persistent, mild in severity, and worsened by nothing. Patient has a history of spina bifida and is paralyzed from the umbilicus down. She has a urostomy in place. Patient's mother accompanies her. She states that over the last day she has had decreased urine output through the urostomy. Patient states that she feels a pressure sensation in her upper abdomen. There has been 2 episodes of diarrhea over the last couple of days. Patient has been eating and drinking like normal.  She denies any fever, chills, nausea, vomiting. ROS:   Pertinent positives and negatives are stated within HPI, all other systems reviewed and are negative.  --------------------------------------------- PAST HISTORY ---------------------------------------------  Past Medical History:  has a past medical history of Asthma, Blindness, HLD (hyperlipidemia), HTN (hypertension), and Spina bifida ([facility] [address] Past Surgical History:  has a past surgical history that includes other surgical history (unsure); other surgical history (unsure); other surgical history (unsure); other surgical history (as child); other surgical history (\"may\"); other surgical history (2004); other surgical history (2004); csf shunt; Patent ductus arterious ligation (1980); and Cervix surgery. Social History:  reports that she has never smoked. She has never used smokeless tobacco. She reports that she does not drink alcohol or use drugs. Family History: family history includes High Blood Pressure in her brother and father. The patients home medications have been reviewed.     Allergies: Latex, Iv [iodides], Penicillins, Shellfish-derived products, Hydrogen peroxide, Adhesive tape, and Bactrim [sulfamethoxazole-trimethoprim]    -------------------------------------------------- RESULTS 1.005 - 1.030    Blood, Urine MODERATE (A) Negative    pH, UA 7.0 5.0 - 9.0    Protein, UA 30 (A) Negative mg/dL    Urobilinogen, Urine 0.2 <2.0 E.U./dL    Nitrite, Urine Negative Negative    Leukocyte Esterase, Urine LARGE (A) Negative   Microscopic Urinalysis   Result Value Ref Range    WBC, UA 10-20 (A) 0 - 5 /HPF    RBC, UA 2-5 0 - 2 /HPF    Epithelial Cells, UA FEW /HPF    Bacteria, UA MANY (A) None Seen /HPF       RADIOLOGY:  Interpreted by Radiologist.  XR ACUTE ABD SERIES CHEST 1 VW   Final Result   No active abdominal process. Multiple additional observations as outlined   above.             ------------------------- NURSING NOTES AND VITALS REVIEWED ---------------------------   The nursing notes within the ED encounter and vital signs as below have been reviewed. BP (!) 142/78   Pulse 74   Temp 98.6 °F (37 °C) (Oral)   Resp 18   Ht 4' 2\" (1.27 m)   Wt 186 lb (84.4 kg)   SpO2 100%   BMI 52.31 kg/m²   Oxygen Saturation Interpretation: Normal      ---------------------------------------------------PHYSICAL EXAM--------------------------------------      Constitutional/General: Alert and oriented x3, well appearing, non toxic in NAD  Head: NC/AT  Eyes: PERRL, EOMI  Mouth: Oropharynx clear, handling secretions, no trismus  Neck: Supple, full ROM, no meningeal signs  Pulmonary: Lungs clear to auscultation bilaterally, no wheezes, rales, or rhonchi. Not in respiratory distress  Cardiovascular:  Regular rate and rhythm, no murmurs, gallops, or rubs. 2+ distal pulses  Abdomen: Soft, non tender, non distended, urostomy appeciated. Extremities: Moves upper extremities. Warm and well perfused  Skin: warm and dry without rash  Neurologic: GCS 15  Psych: Normal Affect      ------------------------------ ED COURSE/MEDICAL DECISION MAKING----------------------  Medications   0.9 % sodium chloride bolus (0 mLs Intravenous Stopped 4/8/21 0936)         Medical Decision Making:    Labs and imaging obtained.  She has a urinary tract infection. I discussed antibiotics with mom. She states that the patient is allergic to penicillins. She states that she gets a rash and swelling. When asked if she had any difficulty breathing with them she said yes. She is also allergic to Bactrim. I will start the patient on Macrobid. Instructed the patient to take the medication prescribed as directed, to follow-up with her primary care physician this week, and to return for worsening symptoms. She is agreeable with plan of care. Counseling: The emergency provider has spoken with the patient and discussed todays results, in addition to providing specific details for the plan of care and counseling regarding the diagnosis and prognosis. Questions are answered at this time and they are agreeable with the plan.      --------------------------------- IMPRESSION AND DISPOSITION ---------------------------------    IMPRESSION  1.  Urinary tract infection with hematuria, site unspecified        DISPOSITION  Disposition: Discharge to home  Patient condition is stable                Malina Long MD  04/09/21 6125

## 2021-08-04 ENCOUNTER — CARE COORDINATION (OUTPATIENT)
Dept: CARE COORDINATION | Age: 41
End: 2021-08-04

## 2021-10-25 ENCOUNTER — HOSPITAL ENCOUNTER (OUTPATIENT)
Age: 41
Discharge: HOME | End: 2021-10-25
Payer: MEDICARE

## 2021-10-25 LAB
ALBUMIN SERPL-MCNC: 4.4 G/DL (ref 3.5–5.2)
ALP BLD-CCNC: 96 U/L (ref 35–104)
ALT SERPL-CCNC: 11 U/L (ref 0–32)
ANION GAP SERPL CALCULATED.3IONS-SCNC: 10 MMOL/L (ref 7–16)
AST SERPL-CCNC: 10 U/L (ref 0–31)
BILIRUB SERPL-MCNC: 0.5 MG/DL (ref 0–1.2)
BUN BLDV-MCNC: 10 MG/DL (ref 6–20)
CALCIUM SERPL-MCNC: 9.1 MG/DL (ref 8.6–10.2)
CHLORIDE BLD-SCNC: 103 MMOL/L (ref 98–107)
CHOLESTEROL, FASTING: 205 MG/DL (ref 0–199)
CO2: 23 MMOL/L (ref 22–29)
CREAT SERPL-MCNC: 0.4 MG/DL (ref 0.5–1)
GFR AFRICAN AMERICAN: >60
GFR NON-AFRICAN AMERICAN: >60 ML/MIN/1.73
GLUCOSE FASTING: 116 MG/DL (ref 74–99)
HDLC SERPL-MCNC: 59 MG/DL
LDL CHOLESTEROL CALCULATED: 123 MG/DL (ref 0–99)
MAGNESIUM: 1.8 MG/DL (ref 1.6–2.6)
POTASSIUM SERPL-SCNC: 3.7 MMOL/L (ref 3.5–5)
SODIUM BLD-SCNC: 136 MMOL/L (ref 132–146)
TOTAL CK: 105 U/L (ref 20–180)
TOTAL PROTEIN: 7.9 G/DL (ref 6.4–8.3)
TRIGLYCERIDE, FASTING: 117 MG/DL (ref 0–149)
VITAMIN D 25-HYDROXY: 35 NG/ML (ref 30–100)
VLDLC SERPL CALC-MCNC: 23 MG/DL

## 2021-10-25 PROCEDURE — 80053 COMPREHEN METABOLIC PANEL: CPT

## 2021-10-25 PROCEDURE — 82306 VITAMIN D 25 HYDROXY: CPT

## 2021-10-25 PROCEDURE — 82550 ASSAY OF CK (CPK): CPT

## 2021-10-25 PROCEDURE — 80061 LIPID PANEL: CPT

## 2021-10-25 PROCEDURE — 83735 ASSAY OF MAGNESIUM: CPT

## 2021-10-25 PROCEDURE — 36415 COLL VENOUS BLD VENIPUNCTURE: CPT

## 2022-07-07 ENCOUNTER — HOSPITAL ENCOUNTER (EMERGENCY)
Age: 42
Discharge: OTHER INSTITUTION | End: 2022-07-08
Attending: EMERGENCY MEDICINE
Payer: MEDICARE

## 2022-07-07 DIAGNOSIS — R45.851 SUICIDAL IDEATION: Primary | ICD-10-CM

## 2022-07-07 LAB
ACETAMINOPHEN LEVEL: <5 MCG/ML (ref 10–30)
ALBUMIN SERPL-MCNC: 4.2 G/DL (ref 3.5–5.2)
ALP BLD-CCNC: 110 U/L (ref 35–104)
ALT SERPL-CCNC: 11 U/L (ref 0–32)
AMPHETAMINE SCREEN, URINE: NOT DETECTED
ANION GAP SERPL CALCULATED.3IONS-SCNC: 12 MMOL/L (ref 7–16)
AST SERPL-CCNC: 12 U/L (ref 0–31)
BACTERIA: ABNORMAL /HPF
BARBITURATE SCREEN URINE: NOT DETECTED
BASOPHILS ABSOLUTE: 0.04 E9/L (ref 0–0.2)
BASOPHILS RELATIVE PERCENT: 0.3 % (ref 0–2)
BENZODIAZEPINE SCREEN, URINE: NOT DETECTED
BILIRUB SERPL-MCNC: 0.2 MG/DL (ref 0–1.2)
BILIRUBIN URINE: NEGATIVE
BLOOD, URINE: ABNORMAL
BUN BLDV-MCNC: 12 MG/DL (ref 6–20)
CALCIUM SERPL-MCNC: 8.7 MG/DL (ref 8.6–10.2)
CANNABINOID SCREEN URINE: NOT DETECTED
CHLORIDE BLD-SCNC: 108 MMOL/L (ref 98–107)
CLARITY: ABNORMAL
CO2: 20 MMOL/L (ref 22–29)
COCAINE METABOLITE SCREEN URINE: NOT DETECTED
COLOR: YELLOW
CREAT SERPL-MCNC: 0.4 MG/DL (ref 0.5–1)
EOSINOPHILS ABSOLUTE: 0.02 E9/L (ref 0.05–0.5)
EOSINOPHILS RELATIVE PERCENT: 0.2 % (ref 0–6)
EPITHELIAL CELLS, UA: ABNORMAL /HPF
ETHANOL: <10 MG/DL (ref 0–0.08)
FENTANYL SCREEN, URINE: NOT DETECTED
GFR AFRICAN AMERICAN: >60
GFR NON-AFRICAN AMERICAN: >60 ML/MIN/1.73
GLUCOSE BLD-MCNC: 175 MG/DL (ref 74–99)
GLUCOSE URINE: NEGATIVE MG/DL
HCT VFR BLD CALC: 46.5 % (ref 34–48)
HEMOGLOBIN: 14.5 G/DL (ref 11.5–15.5)
IMMATURE GRANULOCYTES #: 0.06 E9/L
IMMATURE GRANULOCYTES %: 0.5 % (ref 0–5)
INFLUENZA A BY PCR: NOT DETECTED
INFLUENZA B BY PCR: NOT DETECTED
KETONES, URINE: NEGATIVE MG/DL
LEUKOCYTE ESTERASE, URINE: NEGATIVE
LYMPHOCYTES ABSOLUTE: 0.77 E9/L (ref 1.5–4)
LYMPHOCYTES RELATIVE PERCENT: 6.5 % (ref 20–42)
Lab: NORMAL
MCH RBC QN AUTO: 25.9 PG (ref 26–35)
MCHC RBC AUTO-ENTMCNC: 31.2 % (ref 32–34.5)
MCV RBC AUTO: 83.2 FL (ref 80–99.9)
METHADONE SCREEN, URINE: NOT DETECTED
MONOCYTES ABSOLUTE: 0.32 E9/L (ref 0.1–0.95)
MONOCYTES RELATIVE PERCENT: 2.7 % (ref 2–12)
NEUTROPHILS ABSOLUTE: 10.61 E9/L (ref 1.8–7.3)
NEUTROPHILS RELATIVE PERCENT: 89.8 % (ref 43–80)
NITRITE, URINE: NEGATIVE
OPIATE SCREEN URINE: NOT DETECTED
OXYCODONE URINE: NOT DETECTED
PDW BLD-RTO: 14.3 FL (ref 11.5–15)
PH UA: 7 (ref 5–9)
PHENCYCLIDINE SCREEN URINE: NOT DETECTED
PLATELET # BLD: 330 E9/L (ref 130–450)
PMV BLD AUTO: 9.6 FL (ref 7–12)
POTASSIUM SERPL-SCNC: 3.5 MMOL/L (ref 3.5–5)
PROTEIN UA: 30 MG/DL
RBC # BLD: 5.59 E12/L (ref 3.5–5.5)
RBC UA: ABNORMAL /HPF (ref 0–2)
SALICYLATE, SERUM: <0.3 MG/DL (ref 0–30)
SARS-COV-2, NAAT: NOT DETECTED
SODIUM BLD-SCNC: 140 MMOL/L (ref 132–146)
SPECIFIC GRAVITY UA: 1.01 (ref 1–1.03)
TOTAL PROTEIN: 7.7 G/DL (ref 6.4–8.3)
TRICYCLIC ANTIDEPRESSANTS SCREEN SERUM: NEGATIVE NG/ML
UROBILINOGEN, URINE: 0.2 E.U./DL
WBC # BLD: 11.8 E9/L (ref 4.5–11.5)
WBC UA: ABNORMAL /HPF (ref 0–5)

## 2022-07-07 PROCEDURE — 85025 COMPLETE CBC W/AUTO DIFF WBC: CPT

## 2022-07-07 PROCEDURE — 80143 DRUG ASSAY ACETAMINOPHEN: CPT

## 2022-07-07 PROCEDURE — 80053 COMPREHEN METABOLIC PANEL: CPT

## 2022-07-07 PROCEDURE — 99285 EMERGENCY DEPT VISIT HI MDM: CPT

## 2022-07-07 PROCEDURE — 80307 DRUG TEST PRSMV CHEM ANLYZR: CPT

## 2022-07-07 PROCEDURE — 87502 INFLUENZA DNA AMP PROBE: CPT

## 2022-07-07 PROCEDURE — 82077 ASSAY SPEC XCP UR&BREATH IA: CPT

## 2022-07-07 PROCEDURE — 81001 URINALYSIS AUTO W/SCOPE: CPT

## 2022-07-07 PROCEDURE — 93005 ELECTROCARDIOGRAM TRACING: CPT | Performed by: EMERGENCY MEDICINE

## 2022-07-07 PROCEDURE — 80179 DRUG ASSAY SALICYLATE: CPT

## 2022-07-07 PROCEDURE — 87635 SARS-COV-2 COVID-19 AMP PRB: CPT

## 2022-07-07 ASSESSMENT — LIFESTYLE VARIABLES
HOW OFTEN DO YOU HAVE A DRINK CONTAINING ALCOHOL: NEVER
HOW OFTEN DO YOU HAVE A DRINK CONTAINING ALCOHOL: NEVER

## 2022-07-07 ASSESSMENT — PAIN - FUNCTIONAL ASSESSMENT: PAIN_FUNCTIONAL_ASSESSMENT: NONE - DENIES PAIN

## 2022-07-07 ASSESSMENT — PATIENT HEALTH QUESTIONNAIRE - PHQ9: SUM OF ALL RESPONSES TO PHQ QUESTIONS 1-9: 20

## 2022-07-07 NOTE — CARE COORDINATION
SS Note: Pt here for SI. Pt has hx of spinal bifida (birth) and blindness. She states that she has been depressed for a while as well as having suicidal ideations. Pt has been medically cleared and requires IVETTE screen. Pt is not pink slipped- She is voluntary. It was noted she does not follow-up with a therapist & pt denies any homicidal ideations, delusions, hallucinations. SW to pt's room and explained role. Pt's father in room as well as sitter. SW reviewed consent form for [employer]. Pt was crying and noted she did not want her father present when she speaks to counselor/screener. She did sign consent. Pt's father spoke to SW outside room and noted he & pt's mother  27 yrs. Pt lives w/her mother and has been in w/c since birth d/t spinal bifida. Pt has w/c ramp into house and w/c accessible van. Pt is competent and there is no guardian. Pt has not taken any of her meds last 2 months. These include:  Enalapril- 25 mg 1 x day- high BP  Citalopram - 40 mg 1 x day- Depression  Venlafaxine - 75 mg 1 x day-Depression  Atorvastatin Calcicum- 10 mg bedtime-cholesterol     [employer] Consent form faxed to [facility].     1628  SW called [facility] and left VM about referral and requested call back. 1640  SW called [facility] and completed referral to [facility]- there are 3 ahead of pt.    Electronically signed by ADEOLA Herron on 7/7/2022 at 4:50 PM

## 2022-07-07 NOTE — ED PROVIDER NOTES
[sulfamethoxazole-trimethoprim]    ---------------------------------------------------PHYSICAL EXAM--------------------------------------    Constitutional/General: Alert and oriented x3, well appearing, non toxic in NAD  Head: Normocephalic and atraumatic  Eyes: PERRL, EOMI  Mouth: Oropharynx clear, handling secretions, no trismus  Neck: Supple, full ROM, non tender to palpation in the midline, no stridor, no crepitus, no meningeal signs  Pulmonary: Lungs clear to auscultation bilaterally, no wheezes, rales, or rhonchi. Not in respiratory distress  Cardiovascular:  Regular rate. Regular rhythm. No murmurs, gallops, or rubs. 2+ distal pulses  Chest: no chest wall tenderness  Abdomen: Soft. Non tender. Non distended. +BS. No rebound, guarding, or rigidity. Urostomy and stoma present. no pulsatile masses appreciated. Musculoskeletal: Moves upper extremities. Capillary refill <3 seconds  Skin: warm and dry. No rashes. Neurologic: GCS 15  Psych: Normal Affect    -------------------------------------------------- RESULTS -------------------------------------------------  I have personally reviewed all laboratory and imaging results for this patient. Results are listed below.      LABS:  Results for orders placed or performed during the hospital encounter of 07/07/22   RAPID INFLUENZA A/B ANTIGENS    Specimen: Nasopharyngeal   Result Value Ref Range    Influenza A by PCR Not Detected Not Detected    Influenza B by PCR Not Detected Not Detected   COVID-19, Rapid    Specimen: Nasopharyngeal Swab   Result Value Ref Range    SARS-CoV-2, NAAT Not Detected Not Detected   Comprehensive Metabolic Panel   Result Value Ref Range    Sodium 140 132 - 146 mmol/L    Potassium 3.5 3.5 - 5.0 mmol/L    Chloride 108 (H) 98 - 107 mmol/L    CO2 20 (L) 22 - 29 mmol/L    Anion Gap 12 7 - 16 mmol/L    Glucose 175 (H) 74 - 99 mg/dL    BUN 12 6 - 20 mg/dL    CREATININE 0.4 (L) 0.5 - 1.0 mg/dL    GFR Non-African American >60 >=60 mL/min/1.73 Epithelial Cells, UA FEW /HPF    Bacteria, UA MANY (A) None Seen /HPF   EKG 12 Lead   Result Value Ref Range    Ventricular Rate 112 BPM    Atrial Rate 112 BPM    P-R Interval 138 ms    QRS Duration 80 ms    Q-T Interval 320 ms    QTc Calculation (Bazett) 436 ms    P Axis 70 degrees    R Axis 47 degrees    T Axis 22 degrees       RADIOLOGY:  Interpreted by Radiologist.  No orders to display         EKG Interpretation  Interpreted by emergency department physician    Rhythm: sinus tachycardia  Rate: tachycardia  Axis: normal  Conduction: normal  ST Segments: no acute change  T Waves: no acute change    Clinical Impression: no acute changes  Comparison to prior EKG: stable as compared to patient's most recent EKG      ------------------------- NURSING NOTES AND VITALS REVIEWED ---------------------------   The nursing notes within the ED encounter and vital signs as below have been reviewed by myself. BP (!) 135/111   Pulse (!) 101   Temp 98.5 °F (36.9 °C) (Oral)   Resp 20   Ht 4' 5\" (1.346 m)   Wt 185 lb (83.9 kg)   SpO2 96%   BMI 46.30 kg/m²   Oxygen Saturation Interpretation: Normal    The patients available past medical records and past encounters were reviewed. ------------------------------ ED COURSE/MEDICAL DECISION MAKING----------------------  Medications - No data to display          Medical Decision Making:    Patient is medically cleared. Re-Evaluations:             Re-evaluation. Patients symptoms are improving        This patient's ED course included: a personal history and physicial examination, re-evaluation prior to disposition, multiple bedside re-evaluations, cardiac monitoring, continuous pulse oximetry and a personal history and physicial eaxmination    This patient has remained hemodynamically stable during their ED course. Counseling:    The emergency provider has spoken with the patient and discussed todays results, in addition to providing specific details for the plan

## 2022-07-07 NOTE — DISCHARGE INSTR - COC
Continuity of Care Form    Patient Name: Gaby Churchill   :  1980  MRN:  [id number]    Admit date:  2022  Discharge date:  ***    Code Status Order: Prior   Advance Directives:      Admitting Physician:  No admitting provider for patient encounter. PCP: Miguel Cushing, DO    Discharging Nurse: [facility] Unit/Room#:   Discharging Unit Phone Number: ***    Emergency Contact:   Extended Emergency Contact Information  Primary Emergency Contact: Tiffany Valles  Address: [place] [address] Phone: [phone]  Mobile Phone: [phone]  Relation: Parent    Past Surgical History:  Past Surgical History:   Procedure Laterality Date    CERVIX SURGERY      CSF SHUNT      OTHER SURGICAL HISTORY  unsure    back x 3    OTHER SURGICAL HISTORY  unsure    Right leg x 3    OTHER SURGICAL HISTORY  unsure    left leg x 3    OTHER SURGICAL HISTORY  as child    eye surgery    OTHER SURGICAL HISTORY  \"may\"    shunts in head    OTHER SURGICAL HISTORY      \"ostomy\"    OTHER SURGICAL HISTORY      \"perez stoma\"    PATENT DUCTUS ARTERIOUS LIGATION         Immunization History: There is no immunization history on file for this patient. Active Problems:  Patient Active Problem List   Diagnosis Code    Decubitus skin ulcer.  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    Neck pain M54.2    Ulcer of left heel ([facility]) L97.429    Skin ulcer of back with fat layer exposed ([address] L98.422    Osteomyelitis of sacrum ([facility]) M46.28    Pressure injury of sacral region, stage 4 ([facility]) L89.154       Isolation/Infection:   Isolation            No Isolation          Patient Infection Status       None to display            Nurse Assessment:  Last Vital Signs: BP (!) 135/111   Pulse (!) 101   Temp 98.5 °F (36.9 °C) (Oral)   Resp 20   Ht 4' 5\" (1.346 m)   Wt 185 lb (83.9 kg)   SpO2 96%   BMI 46.30 kg/m²     Last documented pain score (0-10 scale):    Last Weight:   Wt Readings from Last 1 Encounters:   22 185 lb (83.9 kg)     Mental Status:  {IP PT MENTAL STATUS:45196}    IV Access:  { KHALIDA IV ACCESS:624041653}    Nursing Mobility/ADLs:  Walking   {P DME GKNL:899262138}  Transfer  {P DME ALVJ:759267804}  Bathing  {CHP DME DCWM:082889824}  Dressing  {CHP DME LLGH:768747999}  Toileting  {CHP DME IXET:742255387}  Feeding  {[facility] DME QLAA:574517624}  Med Admin  {[facility] DME JGBY:239100271}  Med Delivery   { KHALIDA MED Delivery:493253542}    Wound Care Documentation and Therapy:        Elimination:  Continence: Bowel: {YES / QD:24894}  Bladder: {YES / AI:05122}  Urinary Catheter: {Urinary Catheter:845554189}   Colostomy/Ileostomy/Ileal Conduit: {YES / OW:86260}       Date of Last BM: ***  No intake or output data in the 24 hours ending 22 1253  No intake/output data recorded.     Safety Concerns:     [facility] [employer] [employer] Safety Concerns:026724573}    Impairments/Disabilities:      [facility] [employer] Impairments/Disabilities:239853849}    Nutrition Therapy:  Current Nutrition Therapy:   [facility] Diet List:487133599}    Routes of Feeding: {[facility] DME Other Feedings:133538420}  Liquids: {Slp liquid thickness:73161}  Daily Fluid Restriction: {CHP DME Yes amt example:912044870}  Last Modified Barium Swallow with Video (Video Swallowing Test): {Done Not Done ZNRB:612194354}    Treatments at the Time of Hospital Discharge:   Respiratory Treatments: ***  Oxygen Therapy:  {Therapy; copd oxygen:22669}  Ventilator:    {[facility] Vent SHANTA:877288918}    Rehab Therapies: {THERAPEUTIC INTERVENTION:6696928340}  Weight Bearing Status/Restrictions: [employer]  Weight Bearin}  Other Medical Equipment (for information only, NOT a DME order):  {EQUIPMENT:937767003}  Other Treatments: ***    Patient's personal belongings (please select all that are sent with patient):  {HUMAIRA DME Belongings:213519596}    BRIAN

## 2022-07-08 VITALS
DIASTOLIC BLOOD PRESSURE: 71 MMHG | TEMPERATURE: 98.6 F | HEIGHT: 55 IN | RESPIRATION RATE: 18 BRPM | BODY MASS INDEX: 42.81 KG/M2 | WEIGHT: 185 LBS | SYSTOLIC BLOOD PRESSURE: 109 MMHG | OXYGEN SATURATION: 98 % | HEART RATE: 78 BPM

## 2022-07-08 LAB
EKG ATRIAL RATE: 112 BPM
EKG P AXIS: 70 DEGREES
EKG P-R INTERVAL: 138 MS
EKG Q-T INTERVAL: 320 MS
EKG QRS DURATION: 80 MS
EKG QTC CALCULATION (BAZETT): 436 MS
EKG R AXIS: 47 DEGREES
EKG T AXIS: 22 DEGREES
EKG VENTRICULAR RATE: 112 BPM
HCG, URINE, POC: NEGATIVE
Lab: NORMAL
NEGATIVE QC PASS/FAIL: NORMAL
POSITIVE QC PASS/FAIL: NORMAL

## 2022-07-08 ASSESSMENT — PATIENT HEALTH QUESTIONNAIRE - PHQ9: SUM OF ALL RESPONSES TO PHQ QUESTIONS 1-9: 20

## 2022-07-08 NOTE — ACP (ADVANCE CARE PLANNING)
Call to [facility] ED, spoke with Joselo Valdes, reported assessment complete, pt will be reviewed for [address], please complete pink slip and fax to [phone]

## 2022-07-08 NOTE — ED NOTES
Pt placed in hospital bed, she was given a breakfast tray this am.      Rod Lundberg, RN  07/08/22 7447

## 2022-07-08 NOTE — CARE COORDINATION
SS Note: Pt is with SI & has been screened by [facility] and deemed Moderate risk and needs in-pt Psychiatric Unit. 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 She has urostomy but can use her upper body. She lives w/her mother who provides care and they have been arguing more frequently. Pt voiced that \"I have no hope for the future. \"      [facility]/psych declined pt and [facility] is looking for placement w/following results:  [facility]- No beds  [facility]- declined: not medically appropriate & not age appropr.- too young. [facility] [place] [facility]- No beds  [facility]- No beds  [facility]- No beds  [facility]- No beds  [facility]- No beds  [facility]-  No beds  [facility]- No beds but can c/b after 0800 today  [facility]- declined: not medically appropriate  Cheryl Smith- [facility]-packet sent- waiting for response. SEAMUS called [facility] and spoke to Kari. Seamus asked for update and she noted still waiting to hear back from Cheryl Smith. She noted [facility] Service -RN will be following up w/Erna. SEAMUS also asked if [facility]-will have c/b and she will notify [facility] to call. SEAMUS also asked if [facility] can call other facilities simultaneously. Many may not have beds or decline pt and need to speed up the process. She will notify [facility]. [address] & reviewed case. She noted packet has been received & will talk to director to see if they will consider pt. SEAMUS attempted to reach [facility] for [facility] about pt- he did not  & unable to leave . SEAMUS called [facility] and no one knows another # for Papi. SEAMUS did speak to [facility] about pt and she will look to confirm why pt denied.  SEAMUS informed by Jim Fraire yesterday [facility] did have beds & they were to have d/c's.     1230  [facility] update:  [facility]- unable to accommodate  [facility]- packet faxed  [facility]- packet faxed  [address]- called and asked about pt performing ADL's    1300  SEAMUS receives call from [facility]  [facility] for [facility]. He notes to send him e-mail about pt and he can forward to clinical director and  for review. SEAMUS sent secure e-mail.   Electronically signed by ADEOLA Tolliver on 7/8/2022 at 1:22 PM

## 2022-07-08 NOTE — ED NOTES
Pt resting comfortably     Stephanie Simmons, [facility]  07/08/22 1512

## 2022-07-08 NOTE — CARE COORDINATION
SS note: Received call from [place]- admissions @ [facility]. She reviewed pt w/physicain & RN and pt accepted @ [address] [address]. Will need pink slipped faxed to her along w/Covid questionnaire. She will fax Questionnaire to SW.     1400  SW received COVID questionnaire, completed it and faxed it along w/pink slip to [facility].     [employer] [facility] calls and notes accepting physician @ [facility] is: Dr. Justin Fournier. RN 2 RN is [phone]. SW called SHRUTHI Portillo & set up transport. Ambulance form and face sheet completed and placed in soft chart. ETA is 5 hrs. Shaniqua Portillo will try to outsource and call SW back w/dispostion. SW updated pt and family and [place]-RN of POC. SEAMUS called MAC - and left VM on [facility] line about pt's acceptance @ [address] admitting physician's name. [address] calls. She noted she spoke to her supervisor who reported they did not call anyone and will not call anyone as no one will be able to go that far. SEAMUS notes SW will try to outsource and she noted that is fine. SEAMUS called [facility]a [facility]. They do not have the resources to accommodate this trip  SW called [facility]- Nothing available  SW called EMT- Nothing available  SEAMUS called [facility]- Nothing available.   Electronically signed by ADEOLA Salinas on 7/8/2022 at 3:58 PM

## 2022-07-08 NOTE — ED NOTES
Behavioral Health Crisis Assessment      Chief Complaint: Pt is a 37 yo female who presents as a walk-in, pt is to be placed on a pink slip by ED doc, pt reports suicidal ideation, no specific plan, reports increasing depression past 10-14 days, reports recent stress increased: her and mother whom pt lives with  have been arguing more frequently. Pt reports has spina bifida, also became blind 7 years ago. States: \"I have no hope for the future. \"  Much hopelessness voiced. Mental Status Exam: Alert, oriented x4, mood depressed, affect tearful, cries through out interview at times strongly, behavior is cooperative, no signs of agitation, thought form logical, thought form organized, thought content clear, denies A/V hallucinations, paranoia, delusions, none evident in interview. Legal Status  [] Voluntary:  [x] Involuntary, Issued by: ED doc     Gender  [] Male [x] Female [] Transgender  [] Other    Sexual Orientation    [x] Heterosexual [] Homosexual [] Bisexual [] Other    Brief Clinical Summary: Reports not open in community for counseling or psychiatric services, being prescribed meds for depression by PCP but quit taking them. Reports no hx of in-patient psych admissions.      Collateral Information: None    Risk Factors:   Hx of depression  Recent conflicts with mother who is primary caregiver  Non-compliance with medications  Chronic medical issues    Protective Factors:  No access to weapons  Help seeking behavior  Access to essential needs  Safe and stable housing  Steady income      Suicidal Ideations:   [x] Reports:    [] Past [x] Present   [] Denies    Suicide Attempts:  [] Reports:   [x] Denies    C-SSRS Screening Completed by RN: Current Suicide Risk:  [] No Risk [] Low [] Moderate [] High    Homicidal Ideations  [] Reports:   [] Past [] Present   [x] Denies     Self Injurious/Self Mutilation Behaviors:   [] Reports:    [] Past [] Present   [x] Denies    Hallucinations/Delusions   [] Reports: [x] Denies     Substance Use/Alcohol Use/Addiction:   [] Reports:   [x] Denies   [x] SBIRT Screen Complete. Current or Past Substance Abuse Treatment  [] Yes, When and Where:  [x] No    Current or Past Mental Health Treatment:  [x] Yes, When and Where: Meds from PCP  [] No    Legal Issues:  []  Yes (Specify)  [x]  No    Access to Weapons:  []  Yes (Specify)  [x]  No    Trauma History  [] Reports:  [x] Denies     Living Situation: With mother    Employment: none    Education Level: high school    Violence Risk Screenin. Have you ever thought about hurting someone? [x]  No  []  Yes (Ask the questions listed below)   When?  Did you follow through with the thoughts? [] No     [] Yes- When and what happened? 2.  Have you ever threatened anyone? [x]  No  []  Yes (Ask the questions listed below)   When and what happened?  Have you ever threatened someone with a gun, knife or other weapon? []  No  []  Yes - When and what happened? 2. Have you ever had an order of protection taken out against you? []  Yes [x]  No  3. Have you ever been arrested due to violence? []  Yes [x]  No  4. Have you ever been cruel to animals?  []  Yes [x]  No    After consideration of C-SSRS screening results, C-SSRS assessments, and this professional's assessment the patient's overall suicide risk assessed to be:  [] No Risk  [] Low   [x] Moderate   [] High     [x] Discussed current suicide risk, protective and risk factors with RN and ED Physician     Disposition: Referred to [facility] nurse Haq for discussion w/ on-call re: admission [address]   [] Home:   [] Outpatient Provider:   [] Crisis Unit:   [x] Inpatient Psychiatric Unit:  [] Other:                     [address], MSW, LSW  22 [address] Dereck Bonner MSW, [facility]  22 0010

## 2022-07-08 NOTE — ED NOTES
requesting this RN present patient to psychiatrist. Reviewed chart and noted that POC was pregnancy not resulted. Unable to present patient without all required labs resulted.       Mitchell Sosa RN  07/08/22 0265

## 2022-07-09 NOTE — ED NOTES
Patient with father and sitter.   Awaiting transport to Mookie Oliveira RN  07/08/22 [address] [place] [address] Blvd, RN  07/08/22 6170

## 2022-07-09 NOTE — ED NOTES
Father at cart side with patient. Sitter also present. Patient stable and awaiting transport.      German Jane RN  07/08/22 2334

## 2022-08-25 NOTE — DISCHARGE INSTRUCTIONS
Visit Discharge/Physician Orders    Discharge condition: Stable    Assessment of pain at discharge: minimal    Anesthetic used: lidocaine 4%    Discharge to: Home    Left via:Private automobile    Accompanied by: accompanied by father    ECF/HHA: [facility]    Dressing Orders:    Sacrum - cleanse with normal saline, apply alginate ag, cover with border gauze 6x6, change daily. Treatment Orders:    FOLLOW NUTRITIOUS DIET. CHOOSE FOODS HIGH IN PROTEIN -CHICKEN- FISH-AND EGGS,  CHOOSE FOODS HIGH IN VITAMIN C.   MULTIVITAMIN DAILY. [facility] followup visit _______Dr Jessica Felix 2 weeks______________________  (Please note your next appointment above and if you are unable to keep, kindly give a 24 hour notice. Thank you.)    Physician signature:__________________________      If you experience any of the following, please call the [facility] during business hours:    * Increase in Pain  * Temperature over 101  * Increase in drainage from your wound  * Drainage with a foul odor  * Bleeding  * Increase in swelling  * Need for compression bandage changes due to slippage, breakthrough drainage. If you need medical attention outside of the business hours of the [facility] please contact your PCP or go to the nearest emergency room.

## 2022-08-26 ENCOUNTER — HOSPITAL ENCOUNTER (OUTPATIENT)
Dept: WOUND CARE | Age: 42
Discharge: HOME | End: 2022-08-26
Payer: MEDICARE

## 2022-08-26 VITALS
BODY MASS INDEX: 35.18 KG/M2 | SYSTOLIC BLOOD PRESSURE: 122 MMHG | HEIGHT: 55 IN | WEIGHT: 152 LBS | DIASTOLIC BLOOD PRESSURE: 62 MMHG | RESPIRATION RATE: 16 BRPM | TEMPERATURE: 98.2 F

## 2022-08-26 DIAGNOSIS — L98.422 CHRONIC NON-PRESSURE ULCER OF SACRUM EXTENDING TO FAT LEVEL (HCC): ICD-10-CM

## 2022-08-26 DIAGNOSIS — G82.20 PARAPLEGIC SPINAL PARALYSIS (HCC): Primary | ICD-10-CM

## 2022-08-26 PROCEDURE — 11042 DBRDMT SUBQ TIS 1ST 20SQCM/<: CPT | Performed by: NURSE PRACTITIONER

## 2022-08-26 PROCEDURE — 11042 DBRDMT SUBQ TIS 1ST 20SQCM/<: CPT

## 2022-08-26 PROCEDURE — 99214 OFFICE O/P EST MOD 30 MIN: CPT

## 2022-08-26 PROCEDURE — 99203 OFFICE O/P NEW LOW 30 MIN: CPT | Performed by: NURSE PRACTITIONER

## 2022-08-26 NOTE — PROGRESS NOTES
[address]:     [facility] [address] [facility] Hosea Holden  [phone] f: Roxie Patel 93:     [address]  [address] [address]  [phone]  WOUND CARE Dept: [address] [phone]    Patient Information:      Judith Marking  [address] [address]   [phone]   : 1980  AGE: 38 y.o. GENDER: female   EPISODE DATE: 2022    Insurance:      PRIMARY INSURANCE:  Plan: MEDICARE PART A AND B  Coverage: MEDICARE  Effective Date: 2017  Group Number: [unfilled]  Subscriber Number: [id number] - (Medicare)    Payer/Plan Subscr  Sex Relation Sub. Ins. ID Effective Group Num   1. MEDICARE - [place]* CHRIS LINDID* 1980 Female Self [id number]                                    [address] [address]   2.  [place] -* ANA LIND* 1980 Female Self [id number] 16                                    [address]       Patient Wound Information:      Problem List Items Addressed This Visit          Other    Chronic non-pressure ulcer of sacrum extending to fat level ([address]    Paraplegic spinal paralysis ([address]       WOUNDS REQUIRING DRESSING SUPPLIES:     Wound 22 Sacrum #1 (Active)   Wound Image   22 0833   Dressing Status New dressing applied 22 0918   Wound Cleansed Cleansed with saline 22 0918   Dressing/Treatment Alginate with Ag;Dry dressing 22 0918   Offloading for Diabetic Foot Ulcers Offloading not required 22 0918   Wound Length (cm) 2.8 cm 22 0833   Wound Width (cm) 1.8 cm 22 0833   Wound Depth (cm) 0.3 cm 22 0833   Wound Surface Area (cm^2) 5.04 cm^2 22 0833   Wound Volume (cm^3) 1.512 cm^3 22 0833   Post-Procedure Length (cm) 2.8 cm 22   Post-Procedure Width (cm) 1.9 cm 22   Post-Procedure Depth (cm) 0.4 cm 22   Post-Procedure Surface Area (cm^2) 5.32 cm^2 08/26/22 0901   Post-Procedure Volume (cm^3) 2.128 cm^3 08/26/22 0901   Undermining Starts ___ O'Clock 6 08/26/22 0833   Undermining Ends___ O'Clock 10 08/26/22 0833   Undermining Maxium Distance (cm) 0.9 @ 7 08/26/22 0833   Wound Assessment Fibrin;Pink/red;Granulation tissue 08/26/22 0833   Drainage Amount Moderate 08/26/22 0833   Drainage Description Serosanguinous 08/26/22 0833   Odor None 08/26/22 0833   Kiera-wound Assessment Hypopigmented 08/26/22 0833   Number of days: 0          Supplies Requested :      WOUND #: 1   PRIMARY DRESSING:  Alginate with silver pad   Cover and Secure with:  Other 6x6 Border Gauze due to anatomy (in provider notes)     FREQUENCY OF DRESSING CHANGES:  Daily     ADDITIONAL ITEMS:  [] Gloves Small  [x] Gloves Medium [] Gloves Large [] Gloves Freddy Loser  [] Tape 1\" [x] Tape 2\" [] Tape 3\"  [] Medipore Tape  [x] Saline  [] Skin Prep   [] Adhesive Remover   [] Cotton Tip Applicators   [x] Other: 4x4 gauze    Patient Wound(s) Debrided: [x] Yes if yes please add date 8/26/22   [] No    Debribement Type: Excisional/Sharp    Patient currently being seen by Home Health: [] Yes   [x] No    Duration for needed supplies:  []15  []30  []60  [x]90 Days    Electronically signed by Oscar Curry RN on 8/26/2022 at 10:13 AM     Provider Information:      PROVIDER'S NAME: Javier Hughes CNP    NPI: [id number]

## 2022-08-26 NOTE — PLAN OF CARE
Problem: Wound:  Goal: Will show signs of wound healing; wound closure and no evidence of infection  Description: Will show signs of wound healing; wound closure and no evidence of infection  Outcome: Progressing     Problem: Pressure Ulcer:  Goal: Signs of wound healing will improve  Description: Signs of wound healing will improve  Outcome: Progressing  Goal: Absence of new pressure ulcer  Description: Absence of new pressure ulcer  Outcome: Progressing  Goal: Will show no infection signs and symptoms  Description: Will show no infection signs and symptoms  Outcome: Progressing

## 2022-08-26 NOTE — PROGRESS NOTES
Localized gangrene   []  5 Extensive gangrene of the foot     Wound: N/A        PAST MEDICAL HISTORY      Diagnosis Date    Asthma     Blindness 12/2015    HLD (hyperlipidemia)     HTN (hypertension)     Spina bifida ([address]      Past Surgical History:   Procedure Laterality Date    CERVIX SURGERY      CSF SHUNT      OTHER SURGICAL HISTORY  unsure    back x 3    OTHER SURGICAL HISTORY  unsure    Right leg x 3    OTHER SURGICAL HISTORY  unsure    left leg x 3    OTHER SURGICAL HISTORY  as child    eye surgery    OTHER SURGICAL HISTORY  \"may\"    shunts in head    OTHER SURGICAL HISTORY  2004    \"ostomy\"    OTHER SURGICAL HISTORY  2004    \"perez stoma\"    PATENT DUCTUS ARTERIOUS LIGATION  1980     Family History   Problem Relation Age of Onset    High Blood Pressure Father     High Blood Pressure Brother      Social History     Tobacco Use    Smoking status: Never    Smokeless tobacco: Never   Vaping Use    Vaping Use: Never used   Substance Use Topics    Alcohol use: No    Drug use: No     Allergies   Allergen Reactions    Latex Shortness Of Breath and Rash    Iv [Iodides] Shortness Of Breath and Swelling    Penicillins Swelling and Rash    Shellfish-Derived Products Anaphylaxis    Hydrogen Peroxide     Adhesive Tape Rash    Bactrim [Sulfamethoxazole-Trimethoprim] Nausea And Vomiting     Current Outpatient Medications on File Prior to Encounter   Medication Sig Dispense Refill    atorvastatin (LIPITOR) 10 MG tablet Take 10 mg by mouth daily      Probiotic Product (PROBIOTIC-10 PO) Take by mouth daily      vitamin C (ASCORBIC ACID) 500 MG tablet Take 500 mg by mouth daily      naproxen (EC NAPROSYN) 500 MG EC tablet Take 500 mg by mouth as needed for Pain      enalapril (VASOTEC) 5 MG tablet Take 2.5 mg by mouth daily       albuterol sulfate HFA (PROVENTIL;VENTOLIN;PROAIR) 108 (90 Base) MCG/ACT inhaler Inhale 2 puffs into the lungs every 6 hours as needed for Wheezing or Shortness of Breath      Cholecalciferol (VITAMIN D3) 2000 units CAPS Take 1 capsule by mouth nightly       ibuprofen (ADVIL;MOTRIN) 200 MG CAPS Take 2 capsules by mouth daily as needed for Pain       acetaminophen (TYLENOL) 325 MG tablet Take 650 mg by mouth 3 times daily as needed for Pain       citalopram (CELEXA) 40 MG tablet Take 40 mg by mouth nightly        No current facility-administered medications on file prior to encounter.        REVIEW OF SYSTEMS   ROS : All others Negative if blank [], Positive if [x]  General Urinary   [] Fevers [] Hematuria   [] Chills [] Dysuria   [] Weight Loss Neurolgoic   Skin [] Stroke/TIA   [x] Tissue Loss [] Focal weakness   Eyes [] Slurred Speech   [] Wears Glasses/Contacts ENT   [] Vision Changes [] Difficulty swallowing   Respiratory Endocrine    [] Shortness of breath [] Increased Thirst   Cardiovascular Gastrointestinal   [] Chest Pain [] Abdominal Pain   [] Shortness of breath with exertion [] Melena    [] Hematochezia     Objective:    /62   Temp 98.2 °F (36.8 °C) (Tympanic)   Resp 16   Ht 4' 5\" (1.346 m)   Wt 152 lb (68.9 kg)   BMI 38.04 kg/m²   Wt Readings from Last 3 Encounters:   08/26/22 152 lb (68.9 kg)   07/07/22 185 lb (83.9 kg)   04/08/21 186 lb (84.4 kg)       PHYSICAL EXAM  CONSTITUTIONAL:   Awake, alert, cooperative   PSYCHIATRIC :  Oriented to time, place and person      normal insight to disease process  ENT:  External ears and nose without lesions    Hearing deficit is not noted  LUNGS:  No increased work of breathing                  Clear to auscultation bilaterally   CARDIOVASCULAR:  regular rate and rhythm   SKIN:   Skin color is normal   Texture and turgor is  normal   Induration is noted posterior back scarring   EXTREMITIES:   R UE Edema is not noted  L UE Edema is not noted  R LE Edema is not noted  L LE Edema is not noted  Assessment:     Problem List Items Addressed This Visit       Chronic non-pressure ulcer of sacrum extending to fat level [facility])    Paraplegic spinal paralysis ([facility] [address] Pre Debridement Measurements:  Are located in the [place]  Documentation Flow Sheet  Post Debridement Measurements:  Wound/Ulcer Descriptions are Pre Debridement except measurements:     Wound 08/26/22 Sacrum #1 (Active)   Wound Image   08/26/22 0833   Wound Length (cm) 2.8 cm 08/26/22 0833   Wound Width (cm) 1.8 cm 08/26/22 0833   Wound Depth (cm) 0.3 cm 08/26/22 0833   Wound Surface Area (cm^2) 5.04 cm^2 08/26/22 0833   Wound Volume (cm^3) 1.512 cm^3 08/26/22 0833   Post-Procedure Length (cm) 2.8 cm 08/26/22 0901   Post-Procedure Width (cm) 1.9 cm 08/26/22 0901   Post-Procedure Depth (cm) 0.4 cm 08/26/22 0901   Post-Procedure Surface Area (cm^2) 5.32 cm^2 08/26/22 0901   Post-Procedure Volume (cm^3) 2.128 cm^3 08/26/22 0901   Undermining Starts ___ O'Clock 6 08/26/22 0833   Undermining Ends___ O'Clock 10 08/26/22 0833   Undermining Maxium Distance (cm) 0.9 @ 7 08/26/22 0833   Wound Assessment Fibrin;Pink/red;Granulation tissue 08/26/22 0833   Drainage Amount Moderate 08/26/22 0833   Drainage Description Serosanguinous 08/26/22 0833   Odor None 08/26/22 0833   Kiera-wound Assessment Hypopigmented 08/26/22 0833   Number of days: 0          Procedure Note  Indications:  Based on my examination of this patient's wound(s)/ulcer(s) today, debridement is required to promote healing and evaluate the wound base. Performed by: GIANFRANCO Torres CNP    Consent obtained:  Yes    Time out taken:  Yes    Pain Control: Anesthetic  Anesthetic: 4% Lidocaine Liquid Topical     Debridement:Excisional Debridement    Using curette the wound(s)/ulcer(s) was/were sharply debrided down through and including the removal of epidermis, dermis, and subcutaneous tissue.         Devitalized Tissue Debrided:  fibrin, biofilm, and slough to stimulate bleeding to promote healing, post debridement good bleeding base and wound edges noted    Wound/Ulcer #: 1    Percent of Wound/Ulcer Debrided: 100%    Total Surface Area Debrided: 5.32 sq cm     Estimated Blood Loss:  Minimal  Hemostasis Achieved:  by pressure    Procedural Pain:  0  / 10   Post Procedural Pain:  0 / 10     Response to treatment:  Well tolerated by patient. A culture was not done. Plan:     Pt has never been a smoker     In my professional opinion and based off the information that is available at this time this patient has appropriate indication for HBO Therapy: No    Treatment Note please see attached Discharge Instructions    Written patient dismissal instructions given to patient and signed by patient or POA. Discharge Instructions         Visit Discharge/Physician Orders    Discharge condition: Stable    Assessment of pain at discharge: minimal    Anesthetic used: lidocaine 4%    Discharge to: Home    Left via:Private automobile    Accompanied by: accompanied by father    ECF/[facility]HA: [facility]    Dressing Orders:    Sacrum - cleanse with normal saline, apply alginate ag, cover with border gauze 6x6, change daily. Treatment Orders:    FOLLOW NUTRITIOUS DIET. CHOOSE FOODS HIGH IN PROTEIN -CHICKEN- FISH-AND EGGS,  CHOOSE FOODS HIGH IN VITAMIN C.   MULTIVITAMIN DAILY. [facility] followup visit _______Dr Cassi Guzman 2 weeks______________________  (Please note your next appointment above and if you are unable to keep, kindly give a 24 hour notice. Thank you.)    Physician signature:__________________________      If you experience any of the following, please call the [address] [place] [address] Pay with a [address] during business hours:    * Increase in Pain  * Temperature over 101  * Increase in drainage from your wound  * Drainage with a foul odor  * Bleeding  * Increase in swelling  * Need for compression bandage changes due to slippage, breakthrough drainage. If you need medical attention outside of the business hours of the [address] [place] [address] Road please contact your PCP or go to the nearest emergency room.          Electronically signed by GIANFRANCO Martel CNP on 8/26/2022 at 9:09 AM

## 2022-09-09 ENCOUNTER — HOSPITAL ENCOUNTER (OUTPATIENT)
Dept: WOUND CARE | Age: 42
Discharge: HOME | End: 2022-09-09
Payer: MEDICARE

## 2022-09-09 VITALS
TEMPERATURE: 98.3 F | DIASTOLIC BLOOD PRESSURE: 60 MMHG | SYSTOLIC BLOOD PRESSURE: 120 MMHG | HEART RATE: 70 BPM | RESPIRATION RATE: 18 BRPM

## 2022-09-09 DIAGNOSIS — Q05.1 SPINA BIFIDA OF THORACOLUMBAR REGION WITH HYDROCEPHALUS (HCC): ICD-10-CM

## 2022-09-09 DIAGNOSIS — G82.20 PARAPLEGIC SPINAL PARALYSIS (HCC): ICD-10-CM

## 2022-09-09 DIAGNOSIS — M62.3 PARAPLEGIC IMMOBILITY SYNDROME: ICD-10-CM

## 2022-09-09 DIAGNOSIS — L98.422 CHRONIC NON-PRESSURE ULCER OF SACRUM EXTENDING TO FAT LEVEL (HCC): Primary | ICD-10-CM

## 2022-09-09 PROCEDURE — 99214 OFFICE O/P EST MOD 30 MIN: CPT | Performed by: FAMILY MEDICINE

## 2022-09-09 PROCEDURE — 87205 SMEAR GRAM STAIN: CPT

## 2022-09-09 PROCEDURE — 11042 DBRDMT SUBQ TIS 1ST 20SQCM/<: CPT | Performed by: FAMILY MEDICINE

## 2022-09-09 PROCEDURE — 11042 DBRDMT SUBQ TIS 1ST 20SQCM/<: CPT

## 2022-09-09 PROCEDURE — 87077 CULTURE AEROBIC IDENTIFY: CPT

## 2022-09-09 PROCEDURE — 87075 CULTR BACTERIA EXCEPT BLOOD: CPT

## 2022-09-09 PROCEDURE — 87070 CULTURE OTHR SPECIMN AEROBIC: CPT

## 2022-09-09 PROCEDURE — 87186 SC STD MICRODIL/AGAR DIL: CPT

## 2022-09-09 PROCEDURE — 99213 OFFICE O/P EST LOW 20 MIN: CPT

## 2022-09-09 RX ORDER — GENTAMICIN SULFATE 1 MG/G
OINTMENT TOPICAL ONCE
Status: CANCELLED | OUTPATIENT
Start: 2022-09-09 | End: 2022-09-09

## 2022-09-09 RX ORDER — LIDOCAINE HYDROCHLORIDE 40 MG/ML
SOLUTION TOPICAL ONCE
Status: CANCELLED | OUTPATIENT
Start: 2022-09-09 | End: 2022-09-09

## 2022-09-09 RX ORDER — BACITRACIN ZINC AND POLYMYXIN B SULFATE 500; 1000 [USP'U]/G; [USP'U]/G
OINTMENT TOPICAL ONCE
Status: CANCELLED | OUTPATIENT
Start: 2022-09-09 | End: 2022-09-09

## 2022-09-09 RX ORDER — LIDOCAINE 50 MG/G
OINTMENT TOPICAL ONCE
Status: CANCELLED | OUTPATIENT
Start: 2022-09-09 | End: 2022-09-09

## 2022-09-09 RX ORDER — LIDOCAINE HYDROCHLORIDE 20 MG/ML
JELLY TOPICAL ONCE
Status: CANCELLED | OUTPATIENT
Start: 2022-09-09 | End: 2022-09-09

## 2022-09-09 RX ORDER — GINSENG 100 MG
CAPSULE ORAL ONCE
Status: CANCELLED | OUTPATIENT
Start: 2022-09-09 | End: 2022-09-09

## 2022-09-09 RX ORDER — LIDOCAINE 40 MG/G
CREAM TOPICAL ONCE
Status: CANCELLED | OUTPATIENT
Start: 2022-09-09 | End: 2022-09-09

## 2022-09-09 RX ORDER — BACITRACIN, NEOMYCIN, POLYMYXIN B 400; 3.5; 5 [USP'U]/G; MG/G; [USP'U]/G
OINTMENT TOPICAL ONCE
Status: CANCELLED | OUTPATIENT
Start: 2022-09-09 | End: 2022-09-09

## 2022-09-09 RX ORDER — BETAMETHASONE DIPROPIONATE 0.05 %
OINTMENT (GRAM) TOPICAL ONCE
Status: CANCELLED | OUTPATIENT
Start: 2022-09-09 | End: 2022-09-09

## 2022-09-09 RX ORDER — CLOBETASOL PROPIONATE 0.5 MG/G
OINTMENT TOPICAL ONCE
Status: CANCELLED | OUTPATIENT
Start: 2022-09-09 | End: 2022-09-09

## 2022-09-09 RX ORDER — LIDOCAINE HYDROCHLORIDE 40 MG/ML
SOLUTION TOPICAL ONCE
Status: COMPLETED | OUTPATIENT
Start: 2022-09-09 | End: 2022-09-09

## 2022-09-09 RX ADMIN — LIDOCAINE HYDROCHLORIDE 10 ML: 40 SOLUTION TOPICAL at 11:42

## 2022-09-09 NOTE — PROGRESS NOTES
[address] [facility] Comprehensive History and Physical      CHIEF COMPLAINT:  No chief complaint on file. The Story of the Wound    The patient is a 38 y.o. female patient of Jenny Stone DO  who presents with  a ulceration due to pressure and paraplegic immobility wound which is located on the sacrum and buttock Current symptoms include drainage: watery. Pain is rated 0/10.      Past Medical History:    Past Medical History:   Diagnosis Date    Asthma     Blindness 12/2015    HLD (hyperlipidemia)     HTN (hypertension)     Spina bifida ([address]        Past Surgical History:    Past Surgical History:   Procedure Laterality Date    CERVIX SURGERY      CSF SHUNT      OTHER SURGICAL HISTORY  unsure    back x 3    OTHER SURGICAL HISTORY  unsure    Right leg x 3    OTHER SURGICAL HISTORY  unsure    left leg x 3    OTHER SURGICAL HISTORY  as child    eye surgery    OTHER SURGICAL HISTORY  \"may\"    shunts in head    OTHER SURGICAL HISTORY  2004    \"ostomy\"    OTHER SURGICAL HISTORY  2004    \"perez stoma\"    PATENT DUCTUS ARTERIOUS LIGATION  1980       Allergies:    Latex, Iv [iodides], Penicillins, Shellfish-derived products, Hydrogen peroxide, Adhesive tape, and Bactrim [sulfamethoxazole-trimethoprim]    Labs:   CBC with Differential:    Lab Results   Component Value Date/Time    WBC 11.8 07/07/2022 12:51 PM    RBC 5.59 07/07/2022 12:51 PM    HGB 14.5 07/07/2022 12:51 PM    HCT 46.5 07/07/2022 12:51 PM     07/07/2022 12:51 PM    MCV 83.2 07/07/2022 12:51 PM    MCH 25.9 07/07/2022 12:51 PM    MCHC 31.2 07/07/2022 12:51 PM    RDW 14.3 07/07/2022 12:51 PM    SEGSPCT 75 05/12/2011 02:40 PM    LYMPHOPCT 6.5 07/07/2022 12:51 PM    MONOPCT 2.7 07/07/2022 12:51 PM    BASOPCT 0.3 07/07/2022 12:51 PM    MONOSABS 0.32 07/07/2022 12:51 PM    LYMPHSABS 0.77 07/07/2022 12:51 PM    EOSABS 0.02 07/07/2022 12:51 PM    BASOSABS 0.04 07/07/2022 12:51 PM     CMP:    Lab Results   Component Value Date/Time     07/07/2022 12:51 PM    K 3.5 07/07/2022 12:51 PM    K 4.0 04/08/2021 07:26 AM     07/07/2022 12:51 PM    CO2 20 07/07/2022 12:51 PM    BUN 12 07/07/2022 12:51 PM    CREATININE 0.4 07/07/2022 12:51 PM    GFRAA >60 07/07/2022 12:51 PM    LABGLOM >60 07/07/2022 12:51 PM    GLUCOSE 175 07/07/2022 12:51 PM    GLUCOSE 96 05/14/2012 12:25 PM    PROT 7.7 07/07/2022 12:51 PM    LABALBU 4.2 07/07/2022 12:51 PM    LABALBU 4.4 05/12/2011 02:40 PM    CALCIUM 8.7 07/07/2022 12:51 PM    BILITOT 0.2 07/07/2022 12:51 PM    ALKPHOS 110 07/07/2022 12:51 PM    AST 12 07/07/2022 12:51 PM    ALT 11 07/07/2022 12:51 PM     BMP:    Lab Results   Component Value Date/Time     07/07/2022 12:51 PM    K 3.5 07/07/2022 12:51 PM    K 4.0 04/08/2021 07:26 AM     07/07/2022 12:51 PM    CO2 20 07/07/2022 12:51 PM    BUN 12 07/07/2022 12:51 PM    LABALBU 4.2 07/07/2022 12:51 PM    LABALBU 4.4 05/12/2011 02:40 PM    CREATININE 0.4 07/07/2022 12:51 PM    CALCIUM 8.7 07/07/2022 12:51 PM    GFRAA >60 07/07/2022 12:51 PM    LABGLOM >60 07/07/2022 12:51 PM    GLUCOSE 175 07/07/2022 12:51 PM    GLUCOSE 96 05/14/2012 12:25 PM     Hepatic Function Panel:    Lab Results   Component Value Date/Time    ALKPHOS 110 07/07/2022 12:51 PM    ALT 11 07/07/2022 12:51 PM    AST 12 07/07/2022 12:51 PM    PROT 7.7 07/07/2022 12:51 PM    BILITOT 0.2 07/07/2022 12:51 PM    LABALBU 4.2 07/07/2022 12:51 PM    LABALBU 4.4 05/12/2011 02:40 PM     Uric Acid:  No results found for: LABURIC, URICACID  PT/INR:    Lab Results   Component Value Date/Time    PROTIME 11.9 12/09/2014 12:11 PM    INR 1.0 12/09/2014 12:11 PM     Warfarin PT/INR:  No components found for: PTPATWAR, PTINRWAR  PTT:    Lab Results   Component Value Date/Time    APTT 29.4 12/09/2014 12:11 PM   [APTT}  U/A:    Lab Results   Component Value Date/Time    COLORU Yellow 07/07/2022 01:57 PM    PROTEINU 30 07/07/2022 01:57 PM    PHUR 7.0 07/07/2022 01:57 PM    WBCUA 5-10 07/07/2022 01:57 PM    RBCUA 2-5 07/07/2022 01:57 PM    BACTERIA MANY 07/07/2022 01:57 PM    CLARITYU CLOUDY 07/07/2022 01:57 PM    SPECGRAV 1.010 07/07/2022 01:57 PM    LEUKOCYTESUR Negative 07/07/2022 01:57 PM    UROBILINOGEN 0.2 07/07/2022 01:57 PM    BILIRUBINUR Negative 07/07/2022 01:57 PM    BLOODU TRACE 07/07/2022 01:57 PM    GLUCOSEU Negative 07/07/2022 01:57 PM    AMORPHOUS MANY 10/03/2014 05:45 AM     HgBA1c:    Lab Results   Component Value Date/Time    LABA1C 6.3 10/03/2014 07:55 AM     Microalbumen/Creatinine ratio:  No components found for: RUCREAT      Medications Prior to Admission:    Not in a hospital admission. Social History:    reports that she has never smoked. She has never used smokeless tobacco. She reports that she does not drink alcohol and does not use drugs. Family History:   family history includes High Blood Pressure in her brother and father. REVIEW OF SYSTEMS:  Review of Systems     PHYSICAL EXAM:    Vitals:  Vitals:    09/09/22 0823   BP: 120/60   Pulse: 70   Resp: 18   Temp: 98.3 °F (36.8 °C)       General:  Awake, alert, oriented X 3. Well developed, well nourished, 38 y.o. female. No apparent distress. HEENT:  Normocephalic, atraumatic. Pupils equal, round, reactive to light. No scleral icterus. No conjunctival injection. Normal lips, teeth, and gums. No nasal discharge. Neck:  Supple  Lungs:  CTA bilaterally, bilat symmetrical expansion, no wheeze, rales, or rhonchi  Heart:  RRR, no murmurs, gallops, rubs  Abdomen: Bowel sounds present, soft, nontender, no masses, no organomegaly, no peritoneal signs  Extremities:  1+ edema   Skin:  Warm and dry, satisfactory turgor   Neuro:  Cranial nerves 2-12 intact, no focal deficits.     Wound Metric Flow:   /60   Pulse 70   Temp 98.3 °F (36.8 °C) (Temporal)   Resp 18   Wound serous exudate noted      Wound 08/26/22 Sacrum #1 (Active)   Wound Image   09/09/22 0829   Dressing Status New dressing applied 08/26/22 0918   Wound Cleansed Cleansed with saline 08/26/22 0918   Dressing/Treatment Alginate with Ag;Dry dressing 08/26/22 0918   Offloading for Diabetic Foot Ulcers Offloading not required 08/26/22 0918   Wound Length (cm) 3 cm 09/09/22 0829   Wound Width (cm) 1.2 cm 09/09/22 0829   Wound Depth (cm) 0.3 cm 09/09/22 0829   Wound Surface Area (cm^2) 3.6 cm^2 09/09/22 0829   Change in Wound Size % (l*w) 28.57 09/09/22 0829   Wound Volume (cm^3) 1.08 cm^3 09/09/22 0829   Wound Healing % 29 09/09/22 0829   Post-Procedure Length (cm) 3 cm 09/09/22 0924   Post-Procedure Width (cm) 1.3 cm 09/09/22 0924   Post-Procedure Depth (cm) 0.4 cm 09/09/22 0924   Post-Procedure Surface Area (cm^2) 3.9 cm^2 09/09/22 0924   Post-Procedure Volume (cm^3) 1.56 cm^3 09/09/22 0924   Undermining Starts ___ O'Clock 6 09/09/22 0829   Undermining Ends___ O'Clock 10 09/09/22 0829   Undermining Maxium Distance (cm) 0.4 @ 9 09/09/22 0829   Wound Assessment Fibrin;Pink/red;Granulation tissue 09/09/22 0829   Drainage Amount Moderate 09/09/22 0829   Drainage Description Serosanguinous 09/09/22 0829   Odor None 09/09/22 0829   Kiera-wound Assessment Hyperkeratosis (callous); Maceration 09/09/22 0829   Margins Attached edges 09/09/22 0829   Wound Thickness Description not for Pressure Injury Full thickness 09/09/22 0829   Number of days: 14       Wound 09/09/22 # 2 buttock, left (Active)   Wound Image   09/09/22 0829   Wound Length (cm) 2.8 cm 09/09/22 0829   Wound Width (cm) 2 cm 09/09/22 0829   Wound Depth (cm) 0.2 cm 09/09/22 0829   Wound Surface Area (cm^2) 5.6 cm^2 09/09/22 0829   Wound Volume (cm^3) 1.12 cm^3 09/09/22 0829   Post-Procedure Length (cm) 2.8 cm 09/09/22 0924   Post-Procedure Width (cm) 2.1 cm 09/09/22 0924   Post-Procedure Depth (cm) 0.3 cm 09/09/22 0924   Post-Procedure Surface Area (cm^2) 5.88 cm^2 09/09/22 0924   Post-Procedure Volume (cm^3) 1.764 cm^3 09/09/22 0924   Wound Assessment Fibrin;Eschar moist;Pink/red 09/09/22 0829   Drainage Amount Small 09/09/22 0829 Drainage Description Serosanguinous 09/09/22 0829   Odor None 09/09/22 0829   Kiera-wound Assessment Fragile 09/09/22 0829   Margins Attached edges 09/09/22 0829   Wound Thickness Description not for Pressure Injury Full thickness 09/09/22 0829   Number of days: 0               Procedure: Excisional Debridement: Wound # 1. At 3.6 cm sq  The patient was placed in the prone position. Lidocaine  gauze was applied  at beginning of wound evaluation. An  Excisional Debridement was performed. Using a curette and #15 blade scalpel ,  the wound was debrided sharply of all fibrotic, necrotic, and hyperkeratotic tissue, including a layer of surrounding healthy tissue to stimulate epithelization. The wound was excised through the level of the subcutaneous Wound Percentage debrided is 100%   Wound was irrigated with normal saline solution. Bleeding was with a moderate amount of bleeding, and controlled with pressure . Patient tolerated procedure well and was given proper instruction. Active Problems:    Chronic non-pressure ulcer of sacrum extending to fat level ([address]    Decubitus skin ulcer. Left Ischium bone    Paraplegic immobility syndrome  Resolved Problems:    * No resolved hospital problems. *       Diagnosis:           Sacral pressure injury stage III   @[id number])@              Plan:  1. H&P, General medical evaluation for the purpose of Comprehensive wound management for maximum healing and minimal morbidity. 2. Excisional Debridement  3. We had a lengthy discussion about the diagnosis and aspects  to consider. Pt needs 6X6 silicone foam dressing due to anatomy.      Jas Foster DO       9/9/2022    9:47 AM

## 2022-09-11 LAB
ANAEROBIC CULTURE: NORMAL
GRAM STAIN RESULT: ABNORMAL
ORGANISM: ABNORMAL
WOUND/ABSCESS: ABNORMAL
WOUND/ABSCESS: ABNORMAL

## 2022-09-13 NOTE — PROGRESS NOTES
[address]:     [facility] [address] [facility] , [address]  J:[phone] f: [address]:     [facility] [place]  [address]  [address]  [phone]  WOUND CARE Dept: [address]  [phone]    Patient Information:      Isis Drake  [facility] [place] [address]   [phone]   : 1980  AGE: 38 y.o. GENDER: female   EPISODE DATE: 2022    Insurance:      PRIMARY INSURANCE:  Plan: MEDICARE PART A AND B  Coverage: MEDICARE  Effective Date: 2017  Group Number: [unfilled]  Subscriber Number: [id number] - (Medicare)    Payer/Plan Subscr  Sex Relation Sub. Ins. ID Effective Group Num   1. MEDICARE - [place]* CHRIS LINDID* 1980 Female Self [id number]                                    [address].  [place]ICAID [place] -* ANA LIND* 1980 Female Self [id number]                                    [address]       Patient Wound Information:      Problem List Items Addressed This Visit          Other    Chronic non-pressure ulcer of sacrum extending to fat level ([address] - Primary    Paraplegic spinal paralysis ([address]    Spina bifida ([address]    Paraplegic immobility syndrome       WOUNDS REQUIRING DRESSING SUPPLIES:     Wound 22 Sacrum #1 (Active)   Wound Image   22 0829   Dressing Status New dressing applied 22 1100   Wound Cleansed Cleansed with saline 22 1100   Dressing/Treatment Alginate with Ag;Silicone border 69/73/96 1100   Offloading for Diabetic Foot Ulcers Offloading not required 22 1100   Wound Length (cm) 3 cm 22 0829   Wound Width (cm) 1.2 cm 22 0829   Wound Depth (cm) 0.3 cm 22 0829   Wound Surface Area (cm^2) 3.6 cm^2 22 0829   Change in Wound Size % (l*w) 28.57 22 0829   Wound Volume (cm^3) 1.08 cm^3 22 0829   Wound Healing % 29 22 0829   Post-Procedure Length (cm) 3 cm 22 6519   Post-Procedure Width (cm) 1.3 cm 09/09/22 0924   Post-Procedure Depth (cm) 0.4 cm 09/09/22 0924   Post-Procedure Surface Area (cm^2) 3.9 cm^2 09/09/22 0924   Post-Procedure Volume (cm^3) 1.56 cm^3 09/09/22 0924   Undermining Starts ___ O'Clock 6 09/09/22 0829   Undermining Ends___ O'Clock 10 09/09/22 0829   Undermining Maxium Distance (cm) 0.4 @ 9 09/09/22 0829   Wound Assessment Fibrin;Pink/red;Granulation tissue 09/09/22 0829   Drainage Amount Moderate 09/09/22 0829   Drainage Description Serosanguinous 09/09/22 0829   Odor None 09/09/22 0829   Kiera-wound Assessment Hyperkeratosis (callous); Maceration 09/09/22 0829   Margins Attached edges 09/09/22 0829   Wound Thickness Description not for Pressure Injury Full thickness 09/09/22 0829   Number of days: 18       Wound 09/09/22 # 2 buttock, left (Active)   Wound Image   09/09/22 0829   Dressing Status New dressing applied 09/09/22 1100   Wound Cleansed Cleansed with saline 09/09/22 1100   Dressing/Treatment Alginate with Ag;Hydrocolloid 09/09/22 1100   Wound Length (cm) 2.8 cm 09/09/22 0829   Wound Width (cm) 2 cm 09/09/22 0829   Wound Depth (cm) 0.2 cm 09/09/22 0829   Wound Surface Area (cm^2) 5.6 cm^2 09/09/22 0829   Wound Volume (cm^3) 1.12 cm^3 09/09/22 0829   Post-Procedure Length (cm) 2.8 cm 09/09/22 0924   Post-Procedure Width (cm) 2.1 cm 09/09/22 0924   Post-Procedure Depth (cm) 0.3 cm 09/09/22 0924   Post-Procedure Surface Area (cm^2) 5.88 cm^2 09/09/22 0924   Post-Procedure Volume (cm^3) 1.764 cm^3 09/09/22 0924   Wound Assessment Fibrin;Eschar moist;Pink/red 09/09/22 0829   Drainage Amount Small 09/09/22 0829   Drainage Description Serosanguinous 09/09/22 0829   Odor None 09/09/22 0829   Kiera-wound Assessment Fragile 09/09/22 0829   Margins Attached edges 09/09/22 0829   Wound Thickness Description not for Pressure Injury Full thickness 09/09/22 0829   Number of days: 4          Supplies Requested :      WOUND #: 1   PRIMARY DRESSING:  Alginate with silver pad   Cover and Secure with:  Other Excel SAP 6x6 silicone border  (see providers progress note)     FREQUENCY OF DRESSING CHANGES:  Daily             ADDITIONAL ITEMS:  [] Gloves Small  [x] Gloves Medium [] Gloves Large [] Gloves Nathan Cierra  [] Tape 1\" [x] Tape 2\" [] Tape 3\"  [] Medipore Tape  [x] Saline  [] Skin Prep   [] Adhesive Remover   [] Cotton Tip Applicators   [x] Other: 4x4 gauze    Patient Wound(s) Debrided: [x] Yes if yes please add date 9/9/22   [] No    Debribement Type: Excisional/Sharp    Patient currently being seen by Home Health: [] Yes   [x] No    Duration for needed supplies:  []15  []30  []60  [x]90 Days    Electronically signed by Prashant Toure RN on 9/9/2022 at 3:45 PM     Provider Information:      PROVIDER'S NAME: Aroldo Herrera DO    NPI: [id number]

## 2022-09-23 ENCOUNTER — HOSPITAL ENCOUNTER (OUTPATIENT)
Dept: WOUND CARE | Age: 42
Discharge: HOME | End: 2022-09-23
Payer: MEDICARE

## 2022-09-23 VITALS
HEIGHT: 55 IN | TEMPERATURE: 97.9 F | WEIGHT: 152 LBS | HEART RATE: 78 BPM | RESPIRATION RATE: 18 BRPM | BODY MASS INDEX: 35.18 KG/M2 | SYSTOLIC BLOOD PRESSURE: 106 MMHG | DIASTOLIC BLOOD PRESSURE: 64 MMHG

## 2022-09-23 DIAGNOSIS — M62.3 PARAPLEGIC IMMOBILITY SYNDROME: ICD-10-CM

## 2022-09-23 DIAGNOSIS — L98.422 CHRONIC NON-PRESSURE ULCER OF SACRUM EXTENDING TO FAT LEVEL (HCC): Primary | ICD-10-CM

## 2022-09-23 DIAGNOSIS — G82.20 PARAPLEGIC SPINAL PARALYSIS (HCC): ICD-10-CM

## 2022-09-23 DIAGNOSIS — Q05.1 SPINA BIFIDA OF THORACOLUMBAR REGION WITH HYDROCEPHALUS (HCC): ICD-10-CM

## 2022-09-23 PROCEDURE — 11042 DBRDMT SUBQ TIS 1ST 20SQCM/<: CPT

## 2022-09-23 PROCEDURE — 11042 DBRDMT SUBQ TIS 1ST 20SQCM/<: CPT | Performed by: FAMILY MEDICINE

## 2022-09-23 RX ORDER — GENTAMICIN SULFATE 1 MG/G
OINTMENT TOPICAL ONCE
Status: CANCELLED | OUTPATIENT
Start: 2022-09-23 | End: 2022-09-23

## 2022-09-23 RX ORDER — BACITRACIN ZINC AND POLYMYXIN B SULFATE 500; 1000 [USP'U]/G; [USP'U]/G
OINTMENT TOPICAL ONCE
Status: CANCELLED | OUTPATIENT
Start: 2022-09-23 | End: 2022-09-23

## 2022-09-23 RX ORDER — BACITRACIN, NEOMYCIN, POLYMYXIN B 400; 3.5; 5 [USP'U]/G; MG/G; [USP'U]/G
OINTMENT TOPICAL ONCE
Status: CANCELLED | OUTPATIENT
Start: 2022-09-23 | End: 2022-09-23

## 2022-09-23 RX ORDER — GINSENG 100 MG
CAPSULE ORAL ONCE
Status: CANCELLED | OUTPATIENT
Start: 2022-09-23 | End: 2022-09-23

## 2022-09-23 RX ORDER — LIDOCAINE 50 MG/G
OINTMENT TOPICAL ONCE
Status: CANCELLED | OUTPATIENT
Start: 2022-09-23 | End: 2022-09-23

## 2022-09-23 RX ORDER — LIDOCAINE 40 MG/G
CREAM TOPICAL ONCE
Status: CANCELLED | OUTPATIENT
Start: 2022-09-23 | End: 2022-09-23

## 2022-09-23 RX ORDER — CLOBETASOL PROPIONATE 0.5 MG/G
OINTMENT TOPICAL ONCE
Status: CANCELLED | OUTPATIENT
Start: 2022-09-23 | End: 2022-09-23

## 2022-09-23 RX ORDER — LIDOCAINE HYDROCHLORIDE 20 MG/ML
JELLY TOPICAL ONCE
Status: CANCELLED | OUTPATIENT
Start: 2022-09-23 | End: 2022-09-23

## 2022-09-23 RX ORDER — LIDOCAINE HYDROCHLORIDE 40 MG/ML
SOLUTION TOPICAL ONCE
Status: CANCELLED | OUTPATIENT
Start: 2022-09-23 | End: 2022-09-23

## 2022-09-23 RX ORDER — BETAMETHASONE DIPROPIONATE 0.05 %
OINTMENT (GRAM) TOPICAL ONCE
Status: CANCELLED | OUTPATIENT
Start: 2022-09-23 | End: 2022-09-23

## 2022-09-23 NOTE — PROGRESS NOTES
Follow-Up Wound Progress Note    Maryam Casper  AGE: 38 y.o. GENDER: female  DOD: 1980  TODAY'S DATE:  9/23/22  Subjective:    Kavitha Hammond is a 38 y.o. female who presents today for wound check. Wound Etiology :  pressure ulcer: Stage IV  Wound Location :  Sacrum Pain : {0/10     Abx : Absent       Overall Wound Assessment  Wound is is unchanged. Size has unchanged  Objective:    /64   Pulse 78   Temp 97.9 °F (36.6 °C) (Temporal)   Resp 18   Ht 4' 5\" (1.346 m)   Wt 152 lb (68.9 kg)   BMI 38.04 kg/m²     Wound   serous exudate noted  Errythema - Present    Wound 08/26/22 Sacrum #1 (Active)   Wound Image   09/09/22 0829   Dressing Status New dressing applied 09/09/22 1100   Wound Cleansed Cleansed with saline 09/09/22 1100   Dressing/Treatment Alginate with Ag;Silicone border 64/58/21 1100   Offloading for Diabetic Foot Ulcers Offloading not required 09/09/22 1100   Wound Length (cm) 2.9 cm 09/23/22 0829   Wound Width (cm) 1 cm 09/23/22 0829   Wound Depth (cm) 0.3 cm 09/23/22 0829   Wound Surface Area (cm^2) 2.9 cm^2 09/23/22 0829   Change in Wound Size % (l*w) 42.46 09/23/22 0829   Wound Volume (cm^3) 0.87 cm^3 09/23/22 0829   Wound Healing % 42 09/23/22 0829   Post-Procedure Length (cm) 1.1 cm 09/23/22 0853   Post-Procedure Width (cm) 0.4 cm 09/23/22 0853   Post-Procedure Depth (cm) 0.4 cm 09/09/22 0924   Post-Procedure Surface Area (cm^2) 0.44 cm^2 09/23/22 0853   Post-Procedure Volume (cm^3) 1.56 cm^3 09/09/22 0924   Undermining Starts ___ O'Clock 10 09/23/22 0829   Undermining Ends___ O'Clock 12 09/23/22 0829   Undermining Maxium Distance (cm) 0.4 @10 09/23/22 0829   Wound Assessment Fibrin;Pink/red;Granulation tissue 09/23/22 0829   Drainage Amount Moderate 09/23/22 0829   Drainage Description Serosanguinous 09/23/22 0829   Odor None 09/23/22 0829   Kiera-wound Assessment Intact; Maceration 09/23/22 0829   Margins Attached edges 09/09/22 0829   Wound Thickness Description not for Pressure Injury Full thickness 09/09/22 0829   Number of days: 28       Wound 09/09/22 # 2 buttock, left (Active)   Wound Image   09/09/22 0829   Dressing Status New dressing applied 09/09/22 1100   Wound Cleansed Cleansed with saline 09/09/22 1100   Dressing/Treatment Alginate with Ag;Hydrocolloid 09/09/22 1100   Wound Length (cm) 1.4 cm 09/23/22 0829   Wound Width (cm) 1 cm 09/23/22 0829   Wound Depth (cm) 0.2 cm 09/23/22 0829   Wound Surface Area (cm^2) 1.4 cm^2 09/23/22 0829   Change in Wound Size % (l*w) 75 09/23/22 0829   Wound Volume (cm^3) 0.28 cm^3 09/23/22 0829   Wound Healing % 75 09/23/22 0829   Post-Procedure Length (cm) 1.4 cm 09/23/22 0853   Post-Procedure Width (cm) 1.1 cm 09/23/22 0853   Post-Procedure Depth (cm) 0.3 cm 09/23/22 0853   Post-Procedure Surface Area (cm^2) 1.54 cm^2 09/23/22 0853   Post-Procedure Volume (cm^3) 0.462 cm^3 09/23/22 0853   Wound Assessment Fibrin;Pink/red 09/23/22 0829   Drainage Amount Small 09/23/22 0829   Drainage Description Serosanguinous 09/23/22 0829   Odor None 09/23/22 0829   Kiera-wound Assessment Fragile 09/23/22 0829   Margins Attached edges 09/09/22 0829   Wound Thickness Description not for Pressure Injury Full thickness 09/09/22 0829   Number of days: 14            Assessment:     Please refer to nursing measurements and assessment regarding wound size pre and post debridement. Wound check - Care provided includes removal of existing dressing and visual inspection    Procedure: Debridement Note: Wound # 1. At 2.9 cm sq. The patient was placed in the prone position. Lidocaine  gauze was applied  at beginning of wound evaluation. An  Excisional Debridement was performed. Using a curette ,  the wound was debrided sharply of all fibrotic, necrotic, and hyperkeratotic tissue, including a layer of surrounding healthy tissue to stimulate epithelization. The wound was excised through the level of the subcutaneous Wound Percentage debrided is 100%.  Please refer to Nurses notes for pre and post debridement dimensions. Wound was irrigated with normal saline solution. Bleeding was with a small amount of bleeding, and controlled with pressure. Patient tolerated procedure well and was given proper instruction. Diagnosis: pressure ulcer: Stage IV                    Active Problems:    Spina bifida ([address]    Paraplegic immobility syndrome    Pressure injury of sacral region, stage 4 ([facility])  Resolved Problems:    * No resolved hospital problems. *          Plan:      Treatment & Plan: 1.Excisional Debridement                              2. 6 in X 6 in silicone dressing with foam border over Drawtex. 3. Discussed appropriate home care of this wound. 4. Patient instructions were given. 5. Follow Up in 2 week(s).                                    Araceli Lim DO   9/23/22     8:58 AM

## 2022-09-23 NOTE — PROGRESS NOTES
[address]:     [address]nie [address] [facility] Hosea Holden  [phone] f: Roxie Patel 93:     [address] [place]  [address] [address]  [phone]  WOUND CARE Dept: [address] [phone]    Patient Information:      Rd Parcel  [address] [facility] [address]   [phone]   : 1980  AGE: 38 y.o. GENDER: female   EPISODE DATE: 2022    Insurance:      PRIMARY INSURANCE:  Plan: MEDICARE PART A AND B  Coverage: MEDICARE  Effective Date: 2017  Group Number: [unfilled]  Subscriber Number: [id number] - (Medicare)    Payer/Plan Subscr  Sex Relation Sub. Ins. ID Effective Group Num   1. MEDICARE - ME* DIOGOHEID* 1980 Female Self [id number]                                    [address] [address]   2.  [place] -* CHRIS LINDID* 1980 Female Self [id number]                                    [address]       Patient Wound Information:      Problem List Items Addressed This Visit          Other    Chronic non-pressure ulcer of sacrum extending to fat level ([facility] - Primary    Relevant Orders    Initiate Outpatient Wound Care Protocol    Paraplegic spinal paralysis [facility])    Relevant Orders    Initiate Outpatient Wound Care Protocol    Spina bifida [facility])    Relevant Orders    Initiate Outpatient Wound Care Protocol    Paraplegic immobility syndrome    Relevant Orders    Initiate Outpatient Wound Care Protocol       WOUNDS REQUIRING DRESSING SUPPLIES:     Wound 22 Sacrum #1 (Active)   Wound Image   22 0829   Dressing Status New dressing applied 22 09   Wound Cleansed Cleansed with saline 22 09   Dressing/Treatment Other (comment);Dry dressing 22 09   Offloading for Diabetic Foot Ulcers Offloading not required 22 1100   Wound Length (cm) 2.9 cm 22 0829   Wound Width (cm) 1 cm 22 0829   Wound Depth (cm) 0.3 cm 09/23/22 0829   Wound Surface Area (cm^2) 2.9 cm^2 09/23/22 0829   Change in Wound Size % (l*w) 42.46 09/23/22 0829   Wound Volume (cm^3) 0.87 cm^3 09/23/22 0829   Wound Healing % 42 09/23/22 0829   Post-Procedure Length (cm) 1.1 cm 09/23/22 0853   Post-Procedure Width (cm) 0.4 cm 09/23/22 0853   Post-Procedure Depth (cm) 0.4 cm 09/09/22 0924   Post-Procedure Surface Area (cm^2) 0.44 cm^2 09/23/22 0853   Post-Procedure Volume (cm^3) 1.56 cm^3 09/09/22 0924   Undermining Starts ___ O'Clock 10 09/23/22 0829   Undermining Ends___ O'Clock 12 09/23/22 0829   Undermining Maxium Distance (cm) 0.4 @10 09/23/22 0829   Wound Assessment Fibrin;Pink/red;Granulation tissue 09/23/22 0829   Drainage Amount Moderate 09/23/22 0829   Drainage Description Serosanguinous 09/23/22 0829   Odor None 09/23/22 0829   Kiera-wound Assessment Intact; Maceration 09/23/22 0829   Margins Attached edges 09/09/22 0829   Wound Thickness Description not for Pressure Injury Full thickness 09/09/22 0829   Number of days: 28       Wound 09/09/22 # 2 buttock, left (Active)   Wound Image   09/09/22 0829   Dressing Status New dressing applied 09/23/22 0901   Wound Cleansed Cleansed with saline 09/23/22 0901   Dressing/Treatment Split gauze;Dry dressing 09/23/22 0901   Wound Length (cm) 1.4 cm 09/23/22 0829   Wound Width (cm) 1 cm 09/23/22 0829   Wound Depth (cm) 0.2 cm 09/23/22 0829   Wound Surface Area (cm^2) 1.4 cm^2 09/23/22 0829   Change in Wound Size % (l*w) 75 09/23/22 0829   Wound Volume (cm^3) 0.28 cm^3 09/23/22 0829   Wound Healing % 75 09/23/22 0829   Post-Procedure Length (cm) 1.4 cm 09/23/22 0853   Post-Procedure Width (cm) 1.1 cm 09/23/22 0853   Post-Procedure Depth (cm) 0.3 cm 09/23/22 0853   Post-Procedure Surface Area (cm^2) 1.54 cm^2 09/23/22 0853   Post-Procedure Volume (cm^3) 0.462 cm^3 09/23/22 0853   Wound Assessment Fibrin;Pink/red 09/23/22 0829   Drainage Amount Small 09/23/22 0829   Drainage Description Serosanguinous 09/23/22 0829 Odor None 09/23/22 0829   Kiera-wound Assessment Fragile 09/23/22 0829   Margins Attached edges 09/09/22 0829   Wound Thickness Description not for Pressure Injury Full thickness 09/09/22 0829   Number of days: 14          Supplies Requested :      WOUND #: 1 and 2   PRIMARY DRESSING:  Other: drawtex   Cover and Secure with: Other Excell SAP 6x6 (covers both wounds and stays on  due to anatony.  See Dr note)     FREQUENCY OF DRESSING CHANGES:  Daily     ADDITIONAL ITEMS:  [] Gloves Small  [x] Gloves Medium [] Gloves Large [] Gloves [place] Yas  [] Tape 1\" [x] Tape 2\" [] Tape 3\"  [] Medipore Tape  [x] Saline  [] Skin Prep   [] Adhesive Remover   [] Cotton Tip Applicators   [x] Other: 4x4 gauze    Patient Wound(s) Debrided: [x] Yes if yes please add date 09/23/2022   [] No    Debribement Type: Excisional/Sharp    Patient currently being seen by Home Health: [] Yes   [x] No    Duration for needed supplies:  []15  []30  []60  [x]90 Days    Electronically signed by Eliane Muñoz RN on 9/23/2022 at 9:24 AM     Provider Information:      PROVIDER'S NAME: Chicho Bowen DO    NPI: [id number]

## 2022-09-23 NOTE — DISCHARGE INSTRUCTIONS
Visit Discharge/Physician Orders     Discharge condition: Stable     Assessment of pain at discharge: minimal     Anesthetic used: lidocaine 4%     Discharge to: Home     Left via:Private automobile     Accompanied by: accompanied by father     ECF/HHA: [facility]     Dressing Orders:     Sacrum - cleanse with normal saline, apply drawtex, cover with silicone foam border (Excell SAP) 6x6, change daily. Treatment Orders:     FOLLOW NUTRITIOUS DIET. CHOOSE FOODS HIGH IN PROTEIN -CHICKEN- FISH-AND EGGS,  CHOOSE FOODS HIGH IN VITAMIN C.   MULTIVITAMIN DAILY. [address] [place] [address],[address] followup visit _______Dr Celeste Reach 2 weeks______________________  (Please note your next appointment above and if you are unable to keep, kindly give a 24 hour notice. Thank you.)     Physician signature:__________________________        If you experience any of the following, please call the [facility] during business hours:     * Increase in Pain  * Temperature over 101  * Increase in drainage from your wound  * Drainage with a foul odor  * Bleeding  * Increase in swelling  * Need for compression bandage changes due to slippage, breakthrough drainage. If you need medical attention outside of the business hours of the [facility] please contact your PCP or go to the nearest emergency room.

## 2022-09-23 NOTE — PLAN OF CARE
Problem: Wound:  Goal: Will show signs of wound healing; wound closure and no evidence of infection  Description: Will show signs of wound healing; wound closure and no evidence of infection  Outcome: Progressing     Problem: Pressure Ulcer:  Goal: Signs of wound healing will improve  Description: Signs of wound healing will improve  Outcome: Progressing  Goal: Absence of new pressure ulcer  Description: Absence of new pressure ulcer  Outcome: Progressing  Goal: Will show no infection signs and symptoms  Description: Will show no infection signs and symptoms  Outcome: Progressing     Problem: Pain  Goal: Verbalizes/displays adequate comfort level or baseline comfort level  Outcome: Progressing

## 2022-10-07 ENCOUNTER — HOSPITAL ENCOUNTER (OUTPATIENT)
Dept: WOUND CARE | Age: 42
Discharge: HOME | End: 2022-10-07
Payer: MEDICARE

## 2022-10-07 VITALS
RESPIRATION RATE: 18 BRPM | TEMPERATURE: 96.8 F | SYSTOLIC BLOOD PRESSURE: 110 MMHG | DIASTOLIC BLOOD PRESSURE: 84 MMHG | HEART RATE: 78 BPM

## 2022-10-07 DIAGNOSIS — M62.3 PARAPLEGIC IMMOBILITY SYNDROME: ICD-10-CM

## 2022-10-07 DIAGNOSIS — Q05.1 SPINA BIFIDA OF THORACOLUMBAR REGION WITH HYDROCEPHALUS (HCC): ICD-10-CM

## 2022-10-07 DIAGNOSIS — G82.20 PARAPLEGIC SPINAL PARALYSIS (HCC): ICD-10-CM

## 2022-10-07 DIAGNOSIS — L98.422 CHRONIC NON-PRESSURE ULCER OF SACRUM EXTENDING TO FAT LEVEL (HCC): Primary | ICD-10-CM

## 2022-10-07 PROCEDURE — 11042 DBRDMT SUBQ TIS 1ST 20SQCM/<: CPT | Performed by: FAMILY MEDICINE

## 2022-10-07 PROCEDURE — 11042 DBRDMT SUBQ TIS 1ST 20SQCM/<: CPT

## 2022-10-07 RX ORDER — LIDOCAINE 40 MG/G
CREAM TOPICAL ONCE
Status: CANCELLED | OUTPATIENT
Start: 2022-10-07 | End: 2022-10-07

## 2022-10-07 RX ORDER — BETAMETHASONE DIPROPIONATE 0.05 %
OINTMENT (GRAM) TOPICAL ONCE
Status: CANCELLED | OUTPATIENT
Start: 2022-10-07 | End: 2022-10-07

## 2022-10-07 RX ORDER — LIDOCAINE HYDROCHLORIDE 40 MG/ML
SOLUTION TOPICAL ONCE
Status: DISCONTINUED | OUTPATIENT
Start: 2022-10-07 | End: 2022-10-08 | Stop reason: HOSPADM

## 2022-10-07 RX ORDER — BACITRACIN, NEOMYCIN, POLYMYXIN B 400; 3.5; 5 [USP'U]/G; MG/G; [USP'U]/G
OINTMENT TOPICAL ONCE
Status: CANCELLED | OUTPATIENT
Start: 2022-10-07 | End: 2022-10-07

## 2022-10-07 RX ORDER — CLOBETASOL PROPIONATE 0.5 MG/G
OINTMENT TOPICAL ONCE
Status: CANCELLED | OUTPATIENT
Start: 2022-10-07 | End: 2022-10-07

## 2022-10-07 RX ORDER — LIDOCAINE HYDROCHLORIDE 20 MG/ML
JELLY TOPICAL ONCE
Status: CANCELLED | OUTPATIENT
Start: 2022-10-07 | End: 2022-10-07

## 2022-10-07 RX ORDER — GENTAMICIN SULFATE 1 MG/G
OINTMENT TOPICAL ONCE
Status: CANCELLED | OUTPATIENT
Start: 2022-10-07 | End: 2022-10-07

## 2022-10-07 RX ORDER — GINSENG 100 MG
CAPSULE ORAL ONCE
Status: CANCELLED | OUTPATIENT
Start: 2022-10-07 | End: 2022-10-07

## 2022-10-07 RX ORDER — LIDOCAINE 50 MG/G
OINTMENT TOPICAL ONCE
Status: CANCELLED | OUTPATIENT
Start: 2022-10-07 | End: 2022-10-07

## 2022-10-07 RX ORDER — BACITRACIN ZINC AND POLYMYXIN B SULFATE 500; 1000 [USP'U]/G; [USP'U]/G
OINTMENT TOPICAL ONCE
Status: CANCELLED | OUTPATIENT
Start: 2022-10-07 | End: 2022-10-07

## 2022-10-07 RX ORDER — LIDOCAINE HYDROCHLORIDE 40 MG/ML
SOLUTION TOPICAL ONCE
Status: CANCELLED | OUTPATIENT
Start: 2022-10-07 | End: 2022-10-07

## 2022-10-07 NOTE — PROGRESS NOTES
[address]:     [facility] Margarita Huber [address] [facility] Hosea Holden  P:[phone] f: Roxie Patel 93:     [address] [place]  [address] [address]  [phone]  WOUND CARE Dept: [address] [phone]    Patient Information:      Christopher Berg  [address] [facility] [address]   [phone]   : 1980  AGE: 38 y.o. GENDER: female   EPISODE DATE: 10/7/2022    Insurance:      PRIMARY INSURANCE:  Plan: MEDICARE PART A AND B  Coverage: MEDICARE  Effective Date: 2017  Group Number: [unfilled]  Subscriber Number: [id number] - (Medicare)    Payer/Plan Subscr  Sex Relation Sub. Ins. ID Effective Group Num   1. MEDICARE - ME* DIOGOHEID* 1980 Female Self [id number] 17                                    [address] [address]   2.  [place] -* LEKEVINHEID* 1980 Female Self [id number]                                    [address]       Patient Wound Information:      Problem List Items Addressed This Visit          Other    Chronic non-pressure ulcer of sacrum extending to fat level ([facility]) - Primary    Relevant Medications    lidocaine (XYLOCAINE) 4 % external solution    Other Relevant Orders    Initiate Outpatient Wound Care Protocol    Paraplegic spinal paralysis ([address]    Relevant Medications    lidocaine (XYLOCAINE) 4 % external solution    Other Relevant Orders    Initiate Outpatient Wound Care Protocol    Spina bifida ([address]    Relevant Medications    lidocaine (XYLOCAINE) 4 % external solution    Other Relevant Orders    Initiate Outpatient Wound Care Protocol    Paraplegic immobility syndrome    Relevant Medications    lidocaine (XYLOCAINE) 4 % external solution    Other Relevant Orders    Initiate Outpatient Wound Care Protocol       WOUNDS REQUIRING DRESSING SUPPLIES:     Wound 22 Sacrum #1 (Active)   Wound Image   10/07/22 0847   Dressing Status New dressing applied 10/07/22 0953   Wound Cleansed Cleansed with saline 10/07/22 0953   Dressing/Treatment Other (comment);Dry dressing; Foam 10/07/22 0953   Offloading for Diabetic Foot Ulcers Offloading not required 09/09/22 1100   Wound Length (cm) 2.4 cm 10/07/22 0847   Wound Width (cm) 0.9 cm 10/07/22 0847   Wound Depth (cm) 0.3 cm 10/07/22 0847   Wound Surface Area (cm^2) 2.16 cm^2 10/07/22 0847   Change in Wound Size % (l*w) 57.14 10/07/22 0847   Wound Volume (cm^3) 0.648 cm^3 10/07/22 0847   Wound Healing % 57 10/07/22 0847   Post-Procedure Length (cm) 2.4 cm 10/07/22 0928   Post-Procedure Width (cm) 1 cm 10/07/22 0928   Post-Procedure Depth (cm) 0.4 cm 10/07/22 0928   Post-Procedure Surface Area (cm^2) 2.4 cm^2 10/07/22 0928   Post-Procedure Volume (cm^3) 0.96 cm^3 10/07/22 0928   Undermining Starts ___ O'Clock 0 10/07/22 0847   Undermining Ends___ O'Clock 0 10/07/22 0847   Undermining Maxium Distance (cm) 0 10/07/22 0847   Wound Assessment Fibrin;Pink/red;Granulation tissue 10/07/22 0847   Drainage Amount Moderate 10/07/22 0847   Drainage Description Serosanguinous 10/07/22 0847   Odor None 10/07/22 0847   Kiera-wound Assessment Intact; Maceration 10/07/22 0847   Margins Attached edges 10/07/22 0847   Wound Thickness Description not for Pressure Injury Full thickness 10/07/22 0847   Number of days: 42       Wound 09/09/22 # 2 buttock, left (Active)   Wound Image   10/07/22 0847   Dressing Status New dressing applied 10/07/22 0953   Wound Cleansed Cleansed with saline 10/07/22 0953   Dressing/Treatment Foam 10/07/22 0953   Wound Length (cm) 1.2 cm 10/07/22 0847   Wound Width (cm) 1 cm 10/07/22 0847   Wound Depth (cm) 0.2 cm 10/07/22 0847   Wound Surface Area (cm^2) 1.2 cm^2 10/07/22 0847   Change in Wound Size % (l*w) 78.57 10/07/22 0847   Wound Volume (cm^3) 0.24 cm^3 10/07/22 0847   Wound Healing % 79 10/07/22 0847   Post-Procedure Length (cm) 1.4 cm 09/23/22 0853   Post-Procedure Width (cm) 1.1 cm 09/23/22 0853 Post-Procedure Depth (cm) 0.3 cm 09/23/22 0853   Post-Procedure Surface Area (cm^2) 1.54 cm^2 09/23/22 0853   Post-Procedure Volume (cm^3) 0.462 cm^3 09/23/22 0853   Wound Assessment Fibrin;Pink/red 10/07/22 0847   Drainage Amount Moderate 10/07/22 0847   Drainage Description Serosanguinous 10/07/22 0847   Odor None 10/07/22 0847   Kiera-wound Assessment Fragile 10/07/22 0847   Margins Attached edges 10/07/22 0847   Wound Thickness Description not for Pressure Injury Full thickness 10/07/22 0847   Number of days: 28          Supplies Requested :      WOUND #: 1 and 2   PRIMARY DRESSING:  Other: drawtex   Cover and Secure with:  Other Excell SAP 6x6 inch     FREQUENCY OF DRESSING CHANGES:  Daily     ADDITIONAL ITEMS:  [] Gloves Small  [x] Gloves Medium [] Gloves Large [] Gloves XLarge  [] Tape 1\" [x] Tape 2\" [] Tape 3\"  [] Medipore Tape  [x] Saline  [] Skin Prep   [] Adhesive Remover   [] Cotton Tip Applicators   [x] Other: 4x4 gauze    Patient Wound(s) Debrided: [x] Yes if yes please add date 10/7/2022   [] No    Debribement Type: Excisional/Sharp    Patient currently being seen by Home Health: [] Yes   [x] No    Duration for needed supplies:  []15  []30  []60  [x]90 Days    Electronically signed by Marcello Riojas RN on 10/7/2022 at 11:54 AM     Provider Information:      PROVIDER'S NAME: Amy Vasquez DO    NPI: [id number]

## 2022-10-07 NOTE — PROGRESS NOTES
Follow-Up Wound Progress Note    Maryam Casper  AGE: 38 y.o. GENDER: female  DOD: 1980  TODAY'S DATE:  10/7/22  Subjective:    Viviane Bullock is a 38 y.o. female who presents today for wound check. Wound Etiology :  pressure ulcer: Stage IV  Wound Location :  Sacrum Pain : {0/10     Abx : Absent       Overall Wound Assessment  Wound is has improved. Size has decreased  Objective:    /84   Pulse 78   Temp 96.8 °F (36 °C) (Temporal)   Resp 18     Wound   serous exudate noted  Errythema - Present    Wound 08/26/22 Sacrum #1 (Active)   Wound Image   10/07/22 0847   Dressing Status New dressing applied 09/23/22 0901   Wound Cleansed Cleansed with saline 09/23/22 0901   Dressing/Treatment Other (comment);Dry dressing 09/23/22 0901   Offloading for Diabetic Foot Ulcers Offloading not required 09/09/22 1100   Wound Length (cm) 2.4 cm 10/07/22 0847   Wound Width (cm) 0.9 cm 10/07/22 0847   Wound Depth (cm) 0.3 cm 10/07/22 0847   Wound Surface Area (cm^2) 2.16 cm^2 10/07/22 0847   Change in Wound Size % (l*w) 57.14 10/07/22 0847   Wound Volume (cm^3) 0.648 cm^3 10/07/22 0847   Wound Healing % 57 10/07/22 0847   Post-Procedure Length (cm) 2.4 cm 10/07/22 0928   Post-Procedure Width (cm) 1 cm 10/07/22 0928   Post-Procedure Depth (cm) 0.4 cm 10/07/22 0928   Post-Procedure Surface Area (cm^2) 2.4 cm^2 10/07/22 0928   Post-Procedure Volume (cm^3) 0.96 cm^3 10/07/22 0928   Undermining Starts ___ O'Clock 0 10/07/22 0847   Undermining Ends___ O'Clock 0 10/07/22 0847   Undermining Maxium Distance (cm) 0 10/07/22 0847   Wound Assessment Fibrin;Pink/red;Granulation tissue 10/07/22 0847   Drainage Amount Moderate 10/07/22 0847   Drainage Description Serosanguinous 10/07/22 0847   Odor None 10/07/22 0847   Kiera-wound Assessment Intact; Maceration 10/07/22 0847   Margins Attached edges 10/07/22 0847   Wound Thickness Description not for Pressure Injury Full thickness 10/07/22 0847   Number of days: 42 Wound 09/09/22 # 2 buttock, left (Active)   Wound Image   10/07/22 0847   Dressing Status New dressing applied 09/23/22 0901   Wound Cleansed Cleansed with saline 09/23/22 0901   Dressing/Treatment Split gauze;Dry dressing 09/23/22 0901   Wound Length (cm) 1.2 cm 10/07/22 0847   Wound Width (cm) 1 cm 10/07/22 0847   Wound Depth (cm) 0.2 cm 10/07/22 0847   Wound Surface Area (cm^2) 1.2 cm^2 10/07/22 0847   Change in Wound Size % (l*w) 78.57 10/07/22 0847   Wound Volume (cm^3) 0.24 cm^3 10/07/22 0847   Wound Healing % 79 10/07/22 0847   Post-Procedure Length (cm) 1.4 cm 09/23/22 0853   Post-Procedure Width (cm) 1.1 cm 09/23/22 0853   Post-Procedure Depth (cm) 0.3 cm 09/23/22 0853   Post-Procedure Surface Area (cm^2) 1.54 cm^2 09/23/22 0853   Post-Procedure Volume (cm^3) 0.462 cm^3 09/23/22 0853   Wound Assessment Fibrin;Pink/red 10/07/22 0847   Drainage Amount Small 10/07/22 0847   Drainage Description Serosanguinous 10/07/22 0847   Odor None 10/07/22 0847   Kiera-wound Assessment Fragile 10/07/22 0847   Margins Attached edges 10/07/22 0847   Wound Thickness Description not for Pressure Injury Full thickness 10/07/22 0847   Number of days: 28            Assessment:     Please refer to nursing measurements and assessment regarding wound size pre and post debridement. Wound check - Care provided includes removal of existing dressing and visual inspection    Procedure: Debridement Note: Wound # 1. At 2.16 cm sq. The patient was placed in the prone position. Lidocaine  gauze was applied  at beginning of wound evaluation. An  Excisional Debridement was performed. Using a curette and #15 blade scalpel ,  the wound was debrided sharply of all fibrotic, necrotic, and hyperkeratotic tissue, including a layer of surrounding healthy tissue to stimulate epithelization. The wound was excised through the level of the subcutaneous Wound Percentage debrided is 100%.  Please refer to Nurses notes for pre and post debridement dimensions. Wound was irrigated with normal saline solution. Bleeding was with a moderate amount of bleeding, and controlled with pressure. Patient tolerated procedure well and was given proper instruction. Diagnosis: pressure ulcer: Stage IV                    Active Problems:    Paraplegic spinal paralysis ([address]    Spina bifida ([address]    Paraplegic immobility syndrome    Pressure injury of sacral region, stage 4 ([facility])  Resolved Problems:    * No resolved hospital problems. *          Plan:      Treatment & Plan: 1.Excisional Debridement                              2. She needs  6X^ Silicone Foam dressings daily. 3. Discussed appropriate home care of this wound. 4. Patient instructions were given. 5. Follow Up in 1 week(s).                                    Gabino Gunderson DO   10/7/22     9:40 AM

## 2022-10-07 NOTE — DISCHARGE INSTRUCTIONS
Visit Discharge/Physician Orders     Discharge condition: Stable     Assessment of pain at discharge: minimal     Anesthetic used: lidocaine 4%     Discharge to: Home     Left via:Private automobile     Accompanied by: accompanied by father     ECF/HHA: [facility]     Dressing Orders:     Sacrum - cleanse with normal saline, apply drawtex, cover with silicone foam border (Excell SAP) 6x6, change daily. Treatment Orders:     FOLLOW NUTRITIOUS DIET. CHOOSE FOODS HIGH IN PROTEIN -CHICKEN- FISH-AND EGGS,  CHOOSE FOODS HIGH IN VITAMIN C.   MULTIVITAMIN DAILY. [address] [place] [address],[address] followup visit _______Dr Davie William 2 weeks______________________  (Please note your next appointment above and if you are unable to keep, kindly give a 24 hour notice. Thank you.)     Physician signature:__________________________        If you experience any of the following, please call the [facility] during business hours:     * Increase in Pain  * Temperature over 101  * Increase in drainage from your wound  * Drainage with a foul odor  * Bleeding  * Increase in swelling  * Need for compression bandage changes due to slippage, breakthrough drainage. If you need medical attention outside of the business hours of the [facility] please contact your PCP or go to the nearest emergency room.

## 2022-10-17 NOTE — DISCHARGE INSTRUCTIONS
Visit Discharge/Physician Orders     Discharge condition: Stable     Assessment of pain at discharge: minimal     Anesthetic used: lidocaine 4%     Discharge to: Home     Left via:Private automobile     Accompanied by: accompanied by father     ECF/HHA: [facility]     Dressing Orders:     Sacrum - cleanse with normal saline, apply drawtex, cover with silicone foam border (Excell SAP) 6x6, change daily. Treatment Orders:     FOLLOW NUTRITIOUS DIET. CHOOSE FOODS HIGH IN PROTEIN -CHICKEN- FISH-AND EGGS,  CHOOSE FOODS HIGH IN VITAMIN C.   MULTIVITAMIN DAILY. [facility],[address] followup visit _______Dr Garcia Face 2 weeks______________________  (Please note your next appointment above and if you are unable to keep, kindly give a 24 hour notice. Thank you.)     Physician signature:__________________________        If you experience any of the following, please call the [facility] [employer] [facility] during business hours:     * Increase in Pain  * Temperature over 101  * Increase in drainage from your wound  * Drainage with a foul odor  * Bleeding  * Increase in swelling  * Need for compression bandage changes due to slippage, breakthrough drainage. If you need medical attention outside of the business hours of the [address] please contact your PCP or go to the nearest emergency room.

## 2022-10-21 ENCOUNTER — HOSPITAL ENCOUNTER (OUTPATIENT)
Dept: WOUND CARE | Age: 42
Discharge: HOME | End: 2022-10-21
Payer: MEDICARE

## 2022-10-21 VITALS
DIASTOLIC BLOOD PRESSURE: 62 MMHG | HEIGHT: 55 IN | WEIGHT: 185 LBS | HEART RATE: 88 BPM | BODY MASS INDEX: 42.81 KG/M2 | RESPIRATION RATE: 18 BRPM | SYSTOLIC BLOOD PRESSURE: 110 MMHG | TEMPERATURE: 97.9 F

## 2022-10-21 DIAGNOSIS — L98.422 CHRONIC NON-PRESSURE ULCER OF SACRUM EXTENDING TO FAT LEVEL (HCC): Primary | ICD-10-CM

## 2022-10-21 DIAGNOSIS — M62.3 PARAPLEGIC IMMOBILITY SYNDROME: ICD-10-CM

## 2022-10-21 DIAGNOSIS — Q05.1 SPINA BIFIDA OF THORACOLUMBAR REGION WITH HYDROCEPHALUS (HCC): ICD-10-CM

## 2022-10-21 DIAGNOSIS — G82.20 PARAPLEGIC SPINAL PARALYSIS (HCC): ICD-10-CM

## 2022-10-21 PROCEDURE — 11042 DBRDMT SUBQ TIS 1ST 20SQCM/<: CPT

## 2022-10-21 PROCEDURE — 11042 DBRDMT SUBQ TIS 1ST 20SQCM/<: CPT | Performed by: FAMILY MEDICINE

## 2022-10-21 RX ORDER — BACITRACIN, NEOMYCIN, POLYMYXIN B 400; 3.5; 5 [USP'U]/G; MG/G; [USP'U]/G
OINTMENT TOPICAL ONCE
Status: CANCELLED | OUTPATIENT
Start: 2022-10-21 | End: 2022-10-21

## 2022-10-21 RX ORDER — LIDOCAINE 50 MG/G
OINTMENT TOPICAL ONCE
Status: CANCELLED | OUTPATIENT
Start: 2022-10-21 | End: 2022-10-21

## 2022-10-21 RX ORDER — LIDOCAINE HYDROCHLORIDE 40 MG/ML
SOLUTION TOPICAL ONCE
Status: CANCELLED | OUTPATIENT
Start: 2022-10-21 | End: 2022-10-21

## 2022-10-21 RX ORDER — CLOBETASOL PROPIONATE 0.5 MG/G
OINTMENT TOPICAL ONCE
Status: CANCELLED | OUTPATIENT
Start: 2022-10-21 | End: 2022-10-21

## 2022-10-21 RX ORDER — GENTAMICIN SULFATE 1 MG/G
OINTMENT TOPICAL ONCE
Status: CANCELLED | OUTPATIENT
Start: 2022-10-21 | End: 2022-10-21

## 2022-10-21 RX ORDER — BETAMETHASONE DIPROPIONATE 0.05 %
OINTMENT (GRAM) TOPICAL ONCE
Status: CANCELLED | OUTPATIENT
Start: 2022-10-21 | End: 2022-10-21

## 2022-10-21 RX ORDER — BACITRACIN ZINC AND POLYMYXIN B SULFATE 500; 1000 [USP'U]/G; [USP'U]/G
OINTMENT TOPICAL ONCE
Status: CANCELLED | OUTPATIENT
Start: 2022-10-21 | End: 2022-10-21

## 2022-10-21 RX ORDER — LIDOCAINE HYDROCHLORIDE 20 MG/ML
JELLY TOPICAL ONCE
Status: CANCELLED | OUTPATIENT
Start: 2022-10-21 | End: 2022-10-21

## 2022-10-21 RX ORDER — LIDOCAINE 40 MG/G
CREAM TOPICAL ONCE
Status: CANCELLED | OUTPATIENT
Start: 2022-10-21 | End: 2022-10-21

## 2022-10-21 RX ORDER — GINSENG 100 MG
CAPSULE ORAL ONCE
Status: CANCELLED | OUTPATIENT
Start: 2022-10-21 | End: 2022-10-21

## 2022-10-21 NOTE — PROGRESS NOTES
Follow-Up Wound Progress Note    Maryam Casper  AGE: 38 y.o. GENDER: female  DOD: 1980  TODAY'S DATE:  10/21/22  Subjective:    Colton Santos is a 38 y.o. female who presents today for wound check. Wound Etiology :  pressure ulcer: Stage IV  Wound Location :  Sacrum Pain : {0/10     Abx : Absent       Overall Wound Assessment  Wound is has slightly improved. Size has decreased  Objective:    /62   Pulse 88   Temp 97.9 °F (36.6 °C) (Temporal)   Resp 18   Ht 4' 5\" (1.346 m)   Wt 185 lb (83.9 kg)   BMI 46.30 kg/m²     Wound   serous exudate noted  Errythema - Present    Wound 08/26/22 Sacrum #1 (Active)   Wound Image   10/07/22 0847   Dressing Status New dressing applied;Clean;Dry; Intact 10/21/22 0953   Wound Cleansed Cleansed with saline 10/21/22 0953   Dressing/Treatment Dry dressing 10/21/22 0953   Offloading for Diabetic Foot Ulcers Offloading not required 09/09/22 1100   Wound Length (cm) 2.4 cm 10/07/22 0847   Wound Width (cm) 0.9 cm 10/07/22 0847   Wound Depth (cm) 0.3 cm 10/07/22 0847   Wound Surface Area (cm^2) 2.16 cm^2 10/07/22 0847   Change in Wound Size % (l*w) 57.14 10/07/22 0847   Wound Volume (cm^3) 0.648 cm^3 10/07/22 0847   Wound Healing % 57 10/07/22 0847   Post-Procedure Length (cm) 2.5 cm 10/21/22 0938   Post-Procedure Width (cm) 1 cm 10/21/22 0307   Post-Procedure Depth (cm) 0.4 cm 10/21/22 0938   Post-Procedure Surface Area (cm^2) 2.5 cm^2 10/21/22 0938   Post-Procedure Volume (cm^3) 1 cm^3 10/21/22 0938   Undermining Starts ___ O'Clock 0 10/07/22 0847   Undermining Ends___ O'Clock 0 10/07/22 0847   Undermining Maxium Distance (cm) 0 10/07/22 0847   Wound Assessment Fibrin;Pink/red;Granulation tissue 10/07/22 0847   Drainage Amount Moderate 10/07/22 0847   Drainage Description Serosanguinous 10/07/22 0847   Odor None 10/07/22 0847   Kiera-wound Assessment Intact; Maceration 10/07/22 0847   Margins Attached edges 10/07/22 0847   Wound Thickness Description not for Pressure Injury Full thickness 10/07/22 0847   Number of days: 56       Wound 09/09/22 # 2 buttock, left (Active)   Wound Image   10/07/22 0847   Dressing Status New dressing applied;Clean;Dry; Intact 10/21/22 0953   Wound Cleansed Cleansed with saline 10/21/22 0953   Dressing/Treatment Dry dressing 10/21/22 0953   Wound Length (cm) 4 cm 10/21/22 0908   Wound Width (cm) 1 cm 10/21/22 0908   Wound Depth (cm) 0.4 cm 10/21/22 0908   Wound Surface Area (cm^2) 4 cm^2 10/21/22 0908   Change in Wound Size % (l*w) 28.57 10/21/22 0908   Wound Volume (cm^3) 1.6 cm^3 10/21/22 0908   Wound Healing % -43 10/21/22 0908   Post-Procedure Length (cm) 4.1 cm 10/21/22 0938   Post-Procedure Width (cm) 1.1 cm 10/21/22 0938   Post-Procedure Depth (cm) 0.5 cm 10/21/22 0938   Post-Procedure Surface Area (cm^2) 4.51 cm^2 10/21/22 0938   Post-Procedure Volume (cm^3) 2.255 cm^3 10/21/22 0938   Wound Assessment Fibrin;Pink/red 10/21/22 0908   Drainage Amount Small 10/21/22 0908   Drainage Description Serosanguinous 10/21/22 0908   Odor None 10/21/22 0908   Kiera-wound Assessment Fragile 10/21/22 0908   Margins Attached edges 10/07/22 0847   Wound Thickness Description not for Pressure Injury Full thickness 10/07/22 0847   Number of days: 42            Assessment:     Please refer to nursing measurements and assessment regarding wound size pre and post debridement. Wound check - Care provided includes removal of existing dressing and visual inspection    Procedure: Debridement Note: Wound # 1. At 2.16 cm sq. The patient was placed in the prone position. Lidocaine  gauze was applied  at beginning of wound evaluation. An  Excisional Debridement was performed. Using a curette ,  the wound was debrided sharply of all fibrotic, necrotic, and hyperkeratotic tissue, including a layer of surrounding healthy tissue to stimulate epithelization. The wound was excised through the level of the subcutaneous Wound Percentage debrided is 100%.  Please refer to Nurses notes for pre and post debridement dimensions. Wound was irrigated with normal saline solution. Bleeding was with a small amount of bleeding, and controlled with pressure. Patient tolerated procedure well and was given proper instruction. Diagnosis: pressure ulcer: Stage IV                    Active Problems:    Paraplegic spinal paralysis ([address]    Spina bifida ([address]    Pressure injury of sacral region, stage 4 (HCC)  Resolved Problems:    * No resolved hospital problems. *          Plan:      Treatment & Plan: 1.Excisional Debridement                              2. Alginat                              3. Discussed appropriate home care of this wound. 4. Patient instructions were given. 5. Follow Up in 2 week(s).                                    Rogers Love DO   10/21/22     10:12 AM

## 2022-11-02 NOTE — DISCHARGE INSTRUCTIONS
Visit Discharge/Physician Orders     Discharge condition: Stable     Assessment of pain at discharge: minimal     Anesthetic used: lidocaine 4%     Discharge to: Home     Left via:Private automobile     Accompanied by: accompanied by father     ECF/HHA: [facility]     Dressing Orders:     Sacrum - cleanse with normal saline, apply collagen, cover with silicone foam border (Excell SAP) 6x6, change daily. Treatment Orders:     FOLLOW NUTRITIOUS DIET. CHOOSE FOODS HIGH IN PROTEIN -CHICKEN- FISH-AND EGGS,  CHOOSE FOODS HIGH IN VITAMIN C.   MULTIVITAMIN DAILY. [address] [place] [address],[address] followup visit _______ [facility] Karlie Holden 2 weeks______________________  (Please note your next appointment above and if you are unable to keep, kindly give a 24 hour notice. Thank you.)     Physician signature:__________________________        If you experience any of the following, please call the [facility] during business hours:     * Increase in Pain  * Temperature over 101  * Increase in drainage from your wound  * Drainage with a foul odor  * Bleeding  * Increase in swelling  * Need for compression bandage changes due to slippage, breakthrough drainage. If you need medical attention outside of the business hours of the [address] please contact your PCP or go to the nearest emergency room.

## 2022-11-04 ENCOUNTER — HOSPITAL ENCOUNTER (OUTPATIENT)
Dept: WOUND CARE | Age: 42
Discharge: HOME | End: 2022-11-04
Payer: MEDICARE

## 2022-11-04 VITALS
RESPIRATION RATE: 18 BRPM | SYSTOLIC BLOOD PRESSURE: 134 MMHG | DIASTOLIC BLOOD PRESSURE: 81 MMHG | HEART RATE: 83 BPM | TEMPERATURE: 97.7 F

## 2022-11-04 DIAGNOSIS — L98.422 CHRONIC NON-PRESSURE ULCER OF SACRUM EXTENDING TO FAT LEVEL (HCC): Primary | ICD-10-CM

## 2022-11-04 DIAGNOSIS — M62.3 PARAPLEGIC IMMOBILITY SYNDROME: ICD-10-CM

## 2022-11-04 DIAGNOSIS — Q05.1 SPINA BIFIDA OF THORACOLUMBAR REGION WITH HYDROCEPHALUS (HCC): ICD-10-CM

## 2022-11-04 DIAGNOSIS — G82.20 PARAPLEGIC SPINAL PARALYSIS (HCC): ICD-10-CM

## 2022-11-04 PROCEDURE — 11042 DBRDMT SUBQ TIS 1ST 20SQCM/<: CPT | Performed by: FAMILY MEDICINE

## 2022-11-04 PROCEDURE — 11042 DBRDMT SUBQ TIS 1ST 20SQCM/<: CPT

## 2022-11-04 RX ORDER — LIDOCAINE HYDROCHLORIDE 20 MG/ML
JELLY TOPICAL ONCE
Status: CANCELLED | OUTPATIENT
Start: 2022-11-04 | End: 2022-11-04

## 2022-11-04 RX ORDER — CLOBETASOL PROPIONATE 0.5 MG/G
OINTMENT TOPICAL ONCE
Status: CANCELLED | OUTPATIENT
Start: 2022-11-04 | End: 2022-11-04

## 2022-11-04 RX ORDER — LIDOCAINE 50 MG/G
OINTMENT TOPICAL ONCE
Status: CANCELLED | OUTPATIENT
Start: 2022-11-04 | End: 2022-11-04

## 2022-11-04 RX ORDER — BACITRACIN, NEOMYCIN, POLYMYXIN B 400; 3.5; 5 [USP'U]/G; MG/G; [USP'U]/G
OINTMENT TOPICAL ONCE
Status: CANCELLED | OUTPATIENT
Start: 2022-11-04 | End: 2022-11-04

## 2022-11-04 RX ORDER — BETAMETHASONE DIPROPIONATE 0.05 %
OINTMENT (GRAM) TOPICAL ONCE
Status: CANCELLED | OUTPATIENT
Start: 2022-11-04 | End: 2022-11-04

## 2022-11-04 RX ORDER — GINSENG 100 MG
CAPSULE ORAL ONCE
Status: CANCELLED | OUTPATIENT
Start: 2022-11-04 | End: 2022-11-04

## 2022-11-04 RX ORDER — BACITRACIN ZINC AND POLYMYXIN B SULFATE 500; 1000 [USP'U]/G; [USP'U]/G
OINTMENT TOPICAL ONCE
Status: CANCELLED | OUTPATIENT
Start: 2022-11-04 | End: 2022-11-04

## 2022-11-04 RX ORDER — LIDOCAINE HYDROCHLORIDE 40 MG/ML
SOLUTION TOPICAL ONCE
Status: CANCELLED | OUTPATIENT
Start: 2022-11-04 | End: 2022-11-04

## 2022-11-04 RX ORDER — LIDOCAINE 40 MG/G
CREAM TOPICAL ONCE
Status: CANCELLED | OUTPATIENT
Start: 2022-11-04 | End: 2022-11-04

## 2022-11-04 RX ORDER — LIDOCAINE HYDROCHLORIDE 40 MG/ML
SOLUTION TOPICAL ONCE
Status: DISCONTINUED | OUTPATIENT
Start: 2022-11-04 | End: 2022-11-05 | Stop reason: HOSPADM

## 2022-11-04 RX ORDER — GENTAMICIN SULFATE 1 MG/G
OINTMENT TOPICAL ONCE
Status: CANCELLED | OUTPATIENT
Start: 2022-11-04 | End: 2022-11-04

## 2022-11-04 NOTE — PROGRESS NOTES
[address]:     [facility] Margarita Huber [address] [facility] Hosea Holden  H:[phone] f: Roxie Patel 93:     [address] [facility] [address]  [address]   [address]  [phone]  WOUND CARE Dept: [id number]  [phone]    Patient Information:      Moe Lopez  [address] [address]   [phone]   : 1980  AGE: 38 y.o. GENDER: female   EPISODE DATE: 2022    Insurance:      PRIMARY INSURANCE:  Plan: MEDICARE PART A AND B  Coverage: MEDICARE  Effective Date: 2017  Group Number: [unfilled]  Subscriber Number: [id number] - (Medicare)    Payer/Plan Subscr  Sex Relation Sub. Ins. ID Effective Group Num   1. MEDICARE - [place]* DIOGOHEID* 1980 Female Self [id number]                                    [address] [address]   2.  [place] -* CHRIS LINDID* 1980 Female Self [id number]                                    [address]       Patient Wound Information:      Problem List Items Addressed This Visit          Other    Chronic non-pressure ulcer of sacrum extending to fat level (HCC) - Primary    Relevant Medications    lidocaine (XYLOCAINE) 4 % external solution    Other Relevant Orders    Initiate Outpatient Wound Care Protocol    Paraplegic spinal paralysis ([address]    Relevant Medications    lidocaine (XYLOCAINE) 4 % external solution    Other Relevant Orders    Initiate Outpatient Wound Care Protocol    Spina bifida ([address]    Relevant Medications    lidocaine (XYLOCAINE) 4 % external solution    Other Relevant Orders    Initiate Outpatient Wound Care Protocol    Paraplegic immobility syndrome    Relevant Medications    lidocaine (XYLOCAINE) 4 % external solution    Other Relevant Orders    Initiate Outpatient Wound Care Protocol       WOUNDS REQUIRING DRESSING SUPPLIES:     Wound 22 Sacrum #1 (Active)   Wound Image   10/07/22 0847   Dressing Status New dressing applied 11/04/22 0859   Wound Cleansed Cleansed with saline 11/04/22 0859   Dressing/Treatment Collagen;ABD 11/04/22 0859   Offloading for Diabetic Foot Ulcers Offloading not required 11/04/22 0859   Wound Length (cm) 1.6 cm 11/04/22 0826   Wound Width (cm) 0.5 cm 11/04/22 0826   Wound Depth (cm) 0.2 cm 11/04/22 0826   Wound Surface Area (cm^2) 0.8 cm^2 11/04/22 0826   Change in Wound Size % (l*w) 84.13 11/04/22 0826   Wound Volume (cm^3) 0.16 cm^3 11/04/22 0826   Wound Healing % 89 11/04/22 0826   Post-Procedure Length (cm) 1.6 cm 11/04/22 0850   Post-Procedure Width (cm) 0.6 cm 11/04/22 0850   Post-Procedure Depth (cm) 0.3 cm 11/04/22 0850   Post-Procedure Surface Area (cm^2) 0.96 cm^2 11/04/22 0850   Post-Procedure Volume (cm^3) 0.288 cm^3 11/04/22 0850   Undermining Starts ___ O'Clock 0 10/07/22 0847   Undermining Ends___ O'Clock 0 10/07/22 0847   Undermining Maxium Distance (cm) 0 10/07/22 0847   Wound Assessment Fibrin;Pink/red;Granulation tissue 11/04/22 0826   Drainage Amount Small 11/04/22 0826   Drainage Description Serosanguinous 11/04/22 0826   Odor None 11/04/22 0826   Kiera-wound Assessment Intact; Maceration 11/04/22 0826   Margins Attached edges 11/04/22 0826   Wound Thickness Description not for Pressure Injury Full thickness 11/04/22 0826   Number of days: 70       Wound 09/09/22 # 2 buttock, left (Active)   Wound Image   10/07/22 0847   Dressing Status New dressing applied 11/04/22 0859   Wound Cleansed Cleansed with saline 11/04/22 0859   Dressing/Treatment Collagen;ABD 11/04/22 0859   Offloading for Diabetic Foot Ulcers Offloading not required 11/04/22 0859   Wound Length (cm) 2.9 cm 11/04/22 0826   Wound Width (cm) 0.9 cm 11/04/22 0826   Wound Depth (cm) 0.5 cm 11/04/22 0826   Wound Surface Area (cm^2) 2.61 cm^2 11/04/22 0826   Change in Wound Size % (l*w) 53.39 11/04/22 0826   Wound Volume (cm^3) 1.305 cm^3 11/04/22 0826   Wound Healing % -17 11/04/22 0826   Post-Procedure Length (cm) 2.9 cm 11/04/22 2928   Post-Procedure Width (cm) 1 cm 11/04/22 0850   Post-Procedure Depth (cm) 0.6 cm 11/04/22 0850   Post-Procedure Surface Area (cm^2) 2.9 cm^2 11/04/22 0850   Post-Procedure Volume (cm^3) 1.74 cm^3 11/04/22 0850   Wound Assessment Fibrin;Pink/red 11/04/22 0826   Drainage Amount Small 11/04/22 0826   Drainage Description Serosanguinous 11/04/22 0826   Odor None 11/04/22 0826   Kiera-wound Assessment Fragile 11/04/22 0826   Margins Attached edges 11/04/22 0826   Wound Thickness Description not for Pressure Injury Full thickness 11/04/22 0826   Number of days: 56          Supplies Requested :      WOUND #: 1 and 2   PRIMARY DRESSING:  promogram   Cover and Secure with:  Other Excell SAP 6x6 inch   FREQUENCY OF DRESSING CHANGES:  Daily     ADDITIONAL ITEMS:  [] Gloves Small  [x] Gloves Medium [] Gloves Large [] Gloves XLarge  [] Tape 1\" [x] Tape 2\" [] Tape 3\"  [] Medipore Tape  [x] Saline  [] Skin Prep   [] Adhesive Remover   [] Cotton Tip Applicators   [x] Other: 4x4 gauze    Patient Wound(s) Debrided: [x] Yes if yes please add date 11/4/2022   [] No    Debribement Type: Excisional/Sharp    Patient currently being seen by Home Health: [] Yes   [x] No    Duration for needed supplies:  []15  []30  []60  [x]90 Days    Electronically signed by Ru Valenzuela RN on 11/4/2022 at 9:15 AM     Provider Information:      PROVIDER'S NAME: Alexus Reinoso DO    NPI: [id number]

## 2022-11-04 NOTE — PROGRESS NOTES
Follow-Up Wound Progress Note    Maryam Casper  AGE: 38 y.o. GENDER: female  DOD: 1980  TODAY'S DATE:  11/4/22  Subjective:    Christopher Berg is a 38 y.o. female who presents today for wound check. Wound Etiology :  pressure ulcer: Stage IV  Wound Location :  Sacrum Pain : {0/10     Abx : Absent       Overall Wound Assessment  Wound is has slightly improved. Size has decreased  Objective:    /81   Pulse 83   Temp 97.7 °F (36.5 °C) (Temporal)   Resp 18     Wound   serous exudate noted  Errythema - Present    Wound 08/26/22 Sacrum #1 (Active)   Wound Image   10/07/22 0847   Dressing Status New dressing applied 11/04/22 0859   Wound Cleansed Cleansed with saline 11/04/22 0859   Dressing/Treatment Collagen;ABD 11/04/22 0859   Offloading for Diabetic Foot Ulcers Offloading not required 11/04/22 0859   Wound Length (cm) 1.6 cm 11/04/22 0826   Wound Width (cm) 0.5 cm 11/04/22 0826   Wound Depth (cm) 0.2 cm 11/04/22 0826   Wound Surface Area (cm^2) 0.8 cm^2 11/04/22 0826   Change in Wound Size % (l*w) 84.13 11/04/22 0826   Wound Volume (cm^3) 0.16 cm^3 11/04/22 0826   Wound Healing % 89 11/04/22 0826   Post-Procedure Length (cm) 1.6 cm 11/04/22 0850   Post-Procedure Width (cm) 0.6 cm 11/04/22 0850   Post-Procedure Depth (cm) 0.3 cm 11/04/22 0850   Post-Procedure Surface Area (cm^2) 0.96 cm^2 11/04/22 0850   Post-Procedure Volume (cm^3) 0.288 cm^3 11/04/22 0850   Undermining Starts ___ O'Clock 0 10/07/22 0847   Undermining Ends___ O'Clock 0 10/07/22 0847   Undermining Maxium Distance (cm) 0 10/07/22 0847   Wound Assessment Fibrin;Pink/red;Granulation tissue 11/04/22 0826   Drainage Amount Small 11/04/22 0826   Drainage Description Serosanguinous 11/04/22 0826   Odor None 11/04/22 0826   Kiera-wound Assessment Intact; Maceration 11/04/22 0826   Margins Attached edges 11/04/22 0826   Wound Thickness Description not for Pressure Injury Full thickness 11/04/22 0826   Number of days: 70       Wound 09/09/22 # 2 buttock, left (Active)   Wound Image   10/07/22 0847   Dressing Status New dressing applied 11/04/22 0859   Wound Cleansed Cleansed with saline 11/04/22 0859   Dressing/Treatment Collagen;ABD 11/04/22 0859   Offloading for Diabetic Foot Ulcers Offloading not required 11/04/22 0859   Wound Length (cm) 2.9 cm 11/04/22 0826   Wound Width (cm) 0.9 cm 11/04/22 0826   Wound Depth (cm) 0.5 cm 11/04/22 0826   Wound Surface Area (cm^2) 2.61 cm^2 11/04/22 0826   Change in Wound Size % (l*w) 53.39 11/04/22 0826   Wound Volume (cm^3) 1.305 cm^3 11/04/22 0826   Wound Healing % -17 11/04/22 0826   Post-Procedure Length (cm) 2.9 cm 11/04/22 0850   Post-Procedure Width (cm) 1 cm 11/04/22 0850   Post-Procedure Depth (cm) 0.6 cm 11/04/22 0850   Post-Procedure Surface Area (cm^2) 2.9 cm^2 11/04/22 0850   Post-Procedure Volume (cm^3) 1.74 cm^3 11/04/22 0850   Wound Assessment Fibrin;Pink/red 11/04/22 0826   Drainage Amount Small 11/04/22 0826   Drainage Description Serosanguinous 11/04/22 0826   Odor None 11/04/22 0826   Kiera-wound Assessment Fragile 11/04/22 0826   Margins Attached edges 11/04/22 0826   Wound Thickness Description not for Pressure Injury Full thickness 11/04/22 0826   Number of days: 56            Assessment:     Please refer to nursing measurements and assessment regarding wound size pre and post debridement. Wound check - Care provided includes removal of existing dressing and visual inspection    Procedure: Debridement Note: Wound # 1. At 0.8 cm sq. The patient was placed in the prone position. Lidocaine  gauze was applied  at beginning of wound evaluation. An  Excisional Debridement was performed. Using a curette and #15 blade scalpel ,  the wound was debrided sharply of all fibrotic, necrotic, and hyperkeratotic tissue, including a layer of surrounding healthy tissue to stimulate epithelization. The wound was excised through the level of the subcutaneous Wound Percentage debrided is 100%.  Please refer to Nurses notes for pre and post debridement dimensions. Wound was irrigated with normal saline solution. Bleeding was with a small amount of bleeding, and controlled with pressure. Patient tolerated procedure well and was given proper instruction. Diagnosis: pressure ulcer: Stage IV                    Active Problems:    Paraplegic spinal paralysis ([address]    Spina bifida ([address]    Hydrocephalus ([address]    Pressure injury of sacral region, stage 4 ([facility])  Resolved Problems:    * No resolved hospital problems. *          Plan:      Treatment & Plan: 1.Excisional Debridement                              2. Collagen                              3. Discussed appropriate home care of this wound. 4. Patient instructions were given. 5. Follow Up in 4 week(s).                                    Neli Lopez DO   11/4/22     9:17 AM

## 2022-11-18 ENCOUNTER — HOSPITAL ENCOUNTER (OUTPATIENT)
Dept: WOUND CARE | Age: 42
Discharge: HOME | End: 2022-11-18
Payer: MEDICARE

## 2022-11-18 VITALS
HEART RATE: 92 BPM | BODY MASS INDEX: 42.81 KG/M2 | HEIGHT: 55 IN | RESPIRATION RATE: 16 BRPM | DIASTOLIC BLOOD PRESSURE: 68 MMHG | TEMPERATURE: 97 F | SYSTOLIC BLOOD PRESSURE: 122 MMHG | WEIGHT: 185 LBS

## 2022-11-18 DIAGNOSIS — M62.3 PARAPLEGIC IMMOBILITY SYNDROME: ICD-10-CM

## 2022-11-18 DIAGNOSIS — L98.422 CHRONIC NON-PRESSURE ULCER OF SACRUM EXTENDING TO FAT LEVEL (HCC): Primary | ICD-10-CM

## 2022-11-18 DIAGNOSIS — G82.20 PARAPLEGIC SPINAL PARALYSIS (HCC): ICD-10-CM

## 2022-11-18 DIAGNOSIS — Q05.1 SPINA BIFIDA OF THORACOLUMBAR REGION WITH HYDROCEPHALUS (HCC): ICD-10-CM

## 2022-11-18 PROCEDURE — 99213 OFFICE O/P EST LOW 20 MIN: CPT | Performed by: FAMILY MEDICINE

## 2022-11-18 PROCEDURE — 99213 OFFICE O/P EST LOW 20 MIN: CPT

## 2022-11-18 RX ORDER — LIDOCAINE 40 MG/G
CREAM TOPICAL ONCE
Status: CANCELLED | OUTPATIENT
Start: 2022-11-18 | End: 2022-11-18

## 2022-11-18 RX ORDER — BETAMETHASONE DIPROPIONATE 0.05 %
OINTMENT (GRAM) TOPICAL ONCE
Status: CANCELLED | OUTPATIENT
Start: 2022-11-18 | End: 2022-11-18

## 2022-11-18 RX ORDER — GINSENG 100 MG
CAPSULE ORAL ONCE
Status: CANCELLED | OUTPATIENT
Start: 2022-11-18 | End: 2022-11-18

## 2022-11-18 RX ORDER — GENTAMICIN SULFATE 1 MG/G
OINTMENT TOPICAL ONCE
Status: CANCELLED | OUTPATIENT
Start: 2022-11-18 | End: 2022-11-18

## 2022-11-18 RX ORDER — LIDOCAINE HYDROCHLORIDE 40 MG/ML
SOLUTION TOPICAL ONCE
Status: CANCELLED | OUTPATIENT
Start: 2022-11-18 | End: 2022-11-18

## 2022-11-18 RX ORDER — CLOBETASOL PROPIONATE 0.5 MG/G
OINTMENT TOPICAL ONCE
Status: CANCELLED | OUTPATIENT
Start: 2022-11-18 | End: 2022-11-18

## 2022-11-18 RX ORDER — LIDOCAINE 50 MG/G
OINTMENT TOPICAL ONCE
Status: CANCELLED | OUTPATIENT
Start: 2022-11-18 | End: 2022-11-18

## 2022-11-18 RX ORDER — BACITRACIN ZINC AND POLYMYXIN B SULFATE 500; 1000 [USP'U]/G; [USP'U]/G
OINTMENT TOPICAL ONCE
Status: CANCELLED | OUTPATIENT
Start: 2022-11-18 | End: 2022-11-18

## 2022-11-18 RX ORDER — LIDOCAINE HYDROCHLORIDE 20 MG/ML
JELLY TOPICAL ONCE
Status: CANCELLED | OUTPATIENT
Start: 2022-11-18 | End: 2022-11-18

## 2022-11-18 RX ORDER — BACITRACIN, NEOMYCIN, POLYMYXIN B 400; 3.5; 5 [USP'U]/G; MG/G; [USP'U]/G
OINTMENT TOPICAL ONCE
Status: CANCELLED | OUTPATIENT
Start: 2022-11-18 | End: 2022-11-18

## 2022-11-18 NOTE — PLAN OF CARE
Problem: Wound:  Goal: Will show signs of wound healing; wound closure and no evidence of infection  Description: Will show signs of wound healing; wound closure and no evidence of infection  11/18/2022 0849 by Osborne Moritz, RN  Outcome: Completed  11/18/2022 0827 by Hieu Espinoza RN  Outcome: Progressing     Problem: Pressure Ulcer:  Goal: Signs of wound healing will improve  Description: Signs of wound healing will improve  11/18/2022 0849 by Osborne Moritz, RN  Outcome: Completed  11/18/2022 0827 by Hieu Espinoza RN  Outcome: Progressing  Goal: Absence of new pressure ulcer  Description: Absence of new pressure ulcer  11/18/2022 0849 by Osborne Moritz, RN  Outcome: Completed  11/18/2022 0827 by Hieu Espinoza RN  Outcome: Progressing  Goal: Will show no infection signs and symptoms  Description: Will show no infection signs and symptoms  11/18/2022 0849 by Osborne Moritz, RN  Outcome: Completed  11/18/2022 0827 by Hieu Espinoza RN  Outcome: Progressing     Problem: Pain  Goal: Verbalizes/displays adequate comfort level or baseline comfort level  11/18/2022 0849 by Osborne Moritz, RN  Outcome: Completed  11/18/2022 0827 by Hieu Espinoza RN  Outcome: Progressing

## 2022-11-18 NOTE — DISCHARGE INSTRUCTIONS
Visit Discharge/Physician Orders     Discharge condition: Stable     Assessment of pain at discharge: minimal     Anesthetic used: none     Discharge to: Home     Left via:Private automobile     Accompanied by:  father     ECF/HHA: [facility]     Dressing Orders:     [id number] [facility]- keep area clean and dry. Keep all pressure off of area turning side to side. Treatment Orders:FOLLOW NUTRITIOUS DIET. CHOOSE FOODS HIGH IN PROTEIN -CHICKEN- FISH-AND EGGS,  CHOOSE FOODS HIGH IN VITAMIN C.   MULTIVITAMIN DAILY. [facility] followup visit :NONE- healed________________________________  (Please note your next appointment above and if you are unable to keep, kindly give a 24 hour notice. Thank you.)     Physician signature:__________________________        If you experience any of the following, please call the [address] during business hours:     * Increase in Pain  * Temperature over 101  * Increase in drainage from your wound  * Drainage with a foul odor  * Bleeding  * Increase in swelling  * Need for compression bandage changes due to slippage, breakthrough drainage. If you need medical attention outside of the business hours of the [address] [place] [address] [address] please contact your PCP or go to the nearest emergency room.

## 2023-02-20 ENCOUNTER — HOSPITAL ENCOUNTER (OUTPATIENT)
Age: 43
Discharge: HOME | End: 2023-02-22
Payer: MEDICARE

## 2023-02-20 ENCOUNTER — HOSPITAL ENCOUNTER (OUTPATIENT)
Dept: GENERAL RADIOLOGY | Age: 43
Discharge: HOME | End: 2023-02-22
Payer: MEDICARE

## 2023-02-20 DIAGNOSIS — G91.8 OTHER HYDROCEPHALUS (HCC): ICD-10-CM

## 2023-02-20 PROCEDURE — 71045 X-RAY EXAM CHEST 1 VIEW: CPT

## 2023-03-13 ENCOUNTER — HOSPITAL ENCOUNTER (OUTPATIENT)
Age: 43
Discharge: HOME | End: 2023-03-13
Payer: MEDICARE

## 2023-03-13 LAB
ALBUMIN SERPL-MCNC: 3.9 G/DL (ref 3.5–5.2)
ALP BLD-CCNC: 105 U/L (ref 35–104)
ALT SERPL-CCNC: 12 U/L (ref 0–32)
ANION GAP SERPL CALCULATED.3IONS-SCNC: 11 MMOL/L (ref 7–16)
AST SERPL-CCNC: 14 U/L (ref 0–31)
BASOPHILS ABSOLUTE: 0.05 E9/L (ref 0–0.2)
BASOPHILS RELATIVE PERCENT: 0.8 % (ref 0–2)
BILIRUB SERPL-MCNC: 0.3 MG/DL (ref 0–1.2)
BUN BLDV-MCNC: 12 MG/DL (ref 6–20)
CALCIUM SERPL-MCNC: 8.4 MG/DL (ref 8.6–10.2)
CHLORIDE BLD-SCNC: 105 MMOL/L (ref 98–107)
CHOLESTEROL, TOTAL: 138 MG/DL (ref 0–199)
CO2: 22 MMOL/L (ref 22–29)
CREAT SERPL-MCNC: 0.5 MG/DL (ref 0.5–1)
CREATININE URINE: 49 MG/DL (ref 29–226)
EOSINOPHILS ABSOLUTE: 0.28 E9/L (ref 0.05–0.5)
EOSINOPHILS RELATIVE PERCENT: 4.4 % (ref 0–6)
GFR SERPL CREATININE-BSD FRML MDRD: >60 ML/MIN/1.73
GLUCOSE BLD-MCNC: 92 MG/DL (ref 74–99)
HBA1C MFR BLD: 5.9 % (ref 4–5.6)
HCT VFR BLD CALC: 45.4 % (ref 34–48)
HDLC SERPL-MCNC: 54 MG/DL
HEMOGLOBIN: 13.8 G/DL (ref 11.5–15.5)
IMMATURE GRANULOCYTES #: 0.02 E9/L
IMMATURE GRANULOCYTES %: 0.3 % (ref 0–5)
LDL CHOLESTEROL CALCULATED: 73 MG/DL (ref 0–99)
LYMPHOCYTES ABSOLUTE: 1.38 E9/L (ref 1.5–4)
LYMPHOCYTES RELATIVE PERCENT: 21.8 % (ref 20–42)
MAGNESIUM: 2 MG/DL (ref 1.6–2.6)
MCH RBC QN AUTO: 26.4 PG (ref 26–35)
MCHC RBC AUTO-ENTMCNC: 30.4 % (ref 32–34.5)
MCV RBC AUTO: 86.8 FL (ref 80–99.9)
MICROALBUMIN UR-MCNC: 144.5 MG/L
MICROALBUMIN/CREAT UR-RTO: 294.9 (ref 0–30)
MONOCYTES ABSOLUTE: 0.26 E9/L (ref 0.1–0.95)
MONOCYTES RELATIVE PERCENT: 4.1 % (ref 2–12)
NEUTROPHILS ABSOLUTE: 4.33 E9/L (ref 1.8–7.3)
NEUTROPHILS RELATIVE PERCENT: 68.6 % (ref 43–80)
PDW BLD-RTO: 14.3 FL (ref 11.5–15)
PLATELET # BLD: 240 E9/L (ref 130–450)
PMV BLD AUTO: 10.8 FL (ref 7–12)
POTASSIUM SERPL-SCNC: 4.1 MMOL/L (ref 3.5–5)
RBC # BLD: 5.23 E12/L (ref 3.5–5.5)
SODIUM BLD-SCNC: 138 MMOL/L (ref 132–146)
TOTAL PROTEIN: 7.1 G/DL (ref 6.4–8.3)
TRIGL SERPL-MCNC: 53 MG/DL (ref 0–149)
VITAMIN D 25-HYDROXY: 91 NG/ML (ref 30–100)
VLDLC SERPL CALC-MCNC: 11 MG/DL
WBC # BLD: 6.3 E9/L (ref 4.5–11.5)

## 2023-03-13 PROCEDURE — 80053 COMPREHEN METABOLIC PANEL: CPT

## 2023-03-13 PROCEDURE — 80061 LIPID PANEL: CPT

## 2023-03-13 PROCEDURE — 36415 COLL VENOUS BLD VENIPUNCTURE: CPT

## 2023-03-13 PROCEDURE — 82306 VITAMIN D 25 HYDROXY: CPT

## 2023-03-13 PROCEDURE — 82570 ASSAY OF URINE CREATININE: CPT

## 2023-03-13 PROCEDURE — 83036 HEMOGLOBIN GLYCOSYLATED A1C: CPT

## 2023-03-13 PROCEDURE — 82044 UR ALBUMIN SEMIQUANTITATIVE: CPT

## 2023-03-13 PROCEDURE — 85025 COMPLETE CBC W/AUTO DIFF WBC: CPT

## 2023-03-13 PROCEDURE — 83735 ASSAY OF MAGNESIUM: CPT

## 2023-07-14 ENCOUNTER — HOSPITAL ENCOUNTER (EMERGENCY)
Age: 43
Discharge: HOME | End: 2023-07-14
Attending: EMERGENCY MEDICINE
Payer: MEDICARE

## 2023-07-14 ENCOUNTER — APPOINTMENT (OUTPATIENT)
Dept: CT IMAGING | Age: 43
End: 2023-07-14
Payer: MEDICARE

## 2023-07-14 VITALS
SYSTOLIC BLOOD PRESSURE: 145 MMHG | BODY MASS INDEX: 46.3 KG/M2 | TEMPERATURE: 98.7 F | WEIGHT: 185 LBS | RESPIRATION RATE: 20 BRPM | HEART RATE: 91 BPM | OXYGEN SATURATION: 100 % | DIASTOLIC BLOOD PRESSURE: 85 MMHG

## 2023-07-14 DIAGNOSIS — R11.2 NAUSEA AND VOMITING, UNSPECIFIED VOMITING TYPE: ICD-10-CM

## 2023-07-14 DIAGNOSIS — R10.9 ABDOMINAL PAIN, UNSPECIFIED ABDOMINAL LOCATION: Primary | ICD-10-CM

## 2023-07-14 DIAGNOSIS — K59.00 CONSTIPATION, UNSPECIFIED CONSTIPATION TYPE: ICD-10-CM

## 2023-07-14 LAB
ALBUMIN SERPL-MCNC: 4.4 G/DL (ref 3.5–5.2)
ALP SERPL-CCNC: 88 U/L (ref 35–104)
ALT SERPL-CCNC: 15 U/L (ref 0–32)
ANION GAP SERPL CALCULATED.3IONS-SCNC: 14 MMOL/L (ref 7–16)
AST SERPL-CCNC: 13 U/L (ref 0–31)
BASOPHILS # BLD: 0.04 E9/L (ref 0–0.2)
BASOPHILS NFR BLD: 0.3 % (ref 0–2)
BILIRUB SERPL-MCNC: 0.4 MG/DL (ref 0–1.2)
BUN SERPL-MCNC: 12 MG/DL (ref 6–20)
CALCIUM SERPL-MCNC: 8.4 MG/DL (ref 8.6–10.2)
CHLORIDE SERPL-SCNC: 101 MMOL/L (ref 98–107)
CO2 SERPL-SCNC: 22 MMOL/L (ref 22–29)
CREAT SERPL-MCNC: 0.5 MG/DL (ref 0.5–1)
EOSINOPHIL # BLD: 0.05 E9/L (ref 0.05–0.5)
EOSINOPHIL NFR BLD: 0.4 % (ref 0–6)
ERYTHROCYTE [DISTWIDTH] IN BLOOD BY AUTOMATED COUNT: 14.2 FL (ref 11.5–15)
GLUCOSE SERPL-MCNC: 109 MG/DL (ref 74–99)
HCT VFR BLD AUTO: 43.8 % (ref 34–48)
HGB BLD-MCNC: 14.1 G/DL (ref 11.5–15.5)
IMM GRANULOCYTES # BLD: 0.04 E9/L
IMM GRANULOCYTES NFR BLD: 0.3 % (ref 0–5)
LACTATE BLDV-SCNC: 1.5 MMOL/L (ref 0.5–2.2)
LYMPHOCYTES # BLD: 0.75 E9/L (ref 1.5–4)
LYMPHOCYTES NFR BLD: 5.3 % (ref 20–42)
MCH RBC QN AUTO: 27.4 PG (ref 26–35)
MCHC RBC AUTO-ENTMCNC: 32.2 % (ref 32–34.5)
MCV RBC AUTO: 85.2 FL (ref 80–99.9)
MONOCYTES # BLD: 0.29 E9/L (ref 0.1–0.95)
MONOCYTES NFR BLD: 2.1 % (ref 2–12)
NEUTROPHILS # BLD: 12.9 E9/L (ref 1.8–7.3)
NEUTS SEG NFR BLD: 91.6 % (ref 43–80)
PLATELET # BLD AUTO: 244 E9/L (ref 130–450)
PMV BLD AUTO: 10.5 FL (ref 7–12)
POTASSIUM SERPL-SCNC: 4 MMOL/L (ref 3.5–5)
PROT SERPL-MCNC: 7.2 G/DL (ref 6.4–8.3)
RBC # BLD AUTO: 5.14 E12/L (ref 3.5–5.5)
SODIUM SERPL-SCNC: 137 MMOL/L (ref 132–146)
WBC # BLD: 14.1 E9/L (ref 4.5–11.5)

## 2023-07-14 PROCEDURE — 85025 COMPLETE CBC W/AUTO DIFF WBC: CPT

## 2023-07-14 PROCEDURE — 83605 ASSAY OF LACTIC ACID: CPT

## 2023-07-14 PROCEDURE — 2580000003 HC RX 258: Performed by: PHYSICIAN ASSISTANT

## 2023-07-14 PROCEDURE — 74176 CT ABD & PELVIS W/O CONTRAST: CPT

## 2023-07-14 PROCEDURE — 99284 EMERGENCY DEPT VISIT MOD MDM: CPT

## 2023-07-14 PROCEDURE — 80053 COMPREHEN METABOLIC PANEL: CPT

## 2023-07-14 RX ORDER — ONDANSETRON 4 MG/1
4 TABLET, FILM COATED ORAL EVERY 8 HOURS PRN
Qty: 12 TABLET | Refills: 0 | Status: SHIPPED | OUTPATIENT
Start: 2023-07-14 | End: 2023-07-19

## 2023-07-14 RX ORDER — 0.9 % SODIUM CHLORIDE 0.9 %
1000 INTRAVENOUS SOLUTION INTRAVENOUS ONCE
Status: COMPLETED | OUTPATIENT
Start: 2023-07-14 | End: 2023-07-14

## 2023-07-14 RX ADMIN — SODIUM CHLORIDE 1000 ML: 9 INJECTION, SOLUTION INTRAVENOUS at 13:51

## 2023-07-14 ASSESSMENT — LIFESTYLE VARIABLES: HOW MANY STANDARD DRINKS CONTAINING ALCOHOL DO YOU HAVE ON A TYPICAL DAY: PATIENT DOES NOT DRINK

## 2023-07-14 NOTE — ED NOTES
Name: Letty Erwin  : 1980  MRN: [id number]    Date: 2023    Benefits of immediately proceeding with Radiology exam outweigh the risks and therefore the following is being waived:      [x] Pregnancy test    [] Protocol for Iodine allergy    [] MRI questionnaire    [] BUN/Creatinine        RICO Oliva PA-C  [phone]

## 2023-07-14 NOTE — ED PROVIDER NOTES
Shared JOAN-ED Attending Visit. CC: No             [facility],6[address]  ED  Encounter Note  Admit Date/RoomTime: 2023 12:58 PM  ED Room:   NAME: Roxy Casper  : 1980  MRN: [id number]  PCP: Sylvester Martinez DO    CHIEF COMPLAINT     Constipation and Emesis (X 3 days)    HISTORY OF PRESENT ILLNESS        Karissa To is a 37 y.o. female who presents to the ED by private vehicle for unable to move bowels, constipation, beginning 1 week(s) ago. The complaint has been gradually worsening and are moderate in severity. The patient arrived today via private car. She has a history of spina bifida with hydrocephalus and paraplegia secondary to the same. She resides at home with her mother. The patient has a bag for her urine as well as a Wooten pouch in the right lower quadrant to assist with bowel movements. Mom states that typically she pushes 500 cc of fluids into the pouch every other day to help the patient loose stools for rectum. The patient states that she really has not had any bowel movement to speak of for the past week. About a day and a half ago she passed a very small stool. Mom states that she tried some additional flushing today and the patient started having some nausea and vomiting. The patient has very little sensation in the abdomen per mom. She does have some mild upper abdominal discomfort but not much. The patient has had chills but no fever. The patient had all of her prior abdominal surgeries done at [facility]. She is never seen a surgeon locally and reports no prior history with obstruction. REVIEW OF SYSTEMS     Pertinent positives and negatives are stated within HPI, all other systems reviewed and are negative. Past Medical History:  has a past medical history of Asthma, Blindness, HLD (hyperlipidemia), HTN (hypertension), and Spina bifida ([address]).   Surgical History:  has a past surgical history that

## 2023-12-04 ENCOUNTER — HOSPITAL ENCOUNTER (OUTPATIENT)
Age: 43
Discharge: HOME | End: 2023-12-04
Payer: MEDICARE

## 2023-12-04 LAB
ALBUMIN SERPL-MCNC: 3.6 G/DL (ref 3.5–5.2)
ALP SERPL-CCNC: 76 U/L (ref 35–104)
ALT SERPL-CCNC: 14 U/L (ref 0–32)
ANION GAP SERPL CALCULATED.3IONS-SCNC: 10 MMOL/L (ref 7–16)
AST SERPL-CCNC: 13 U/L (ref 0–31)
BILIRUB SERPL-MCNC: 0.3 MG/DL (ref 0–1.2)
BUN SERPL-MCNC: 9 MG/DL (ref 6–20)
CALCIUM SERPL-MCNC: 8.4 MG/DL (ref 8.6–10.2)
CHLORIDE SERPL-SCNC: 109 MMOL/L (ref 98–107)
CHOLEST SERPL-MCNC: 118 MG/DL
CO2 SERPL-SCNC: 19 MMOL/L (ref 22–29)
CREAT SERPL-MCNC: 0.6 MG/DL (ref 0.5–1)
CREAT UR-MCNC: 46.4 MG/DL (ref 29–226)
GFR SERPL CREATININE-BSD FRML MDRD: >60 ML/MIN/1.73M2
GLUCOSE SERPL-MCNC: 84 MG/DL (ref 74–99)
HBA1C MFR BLD: 5.6 % (ref 4–5.6)
HDLC SERPL-MCNC: 54 MG/DL
LDLC SERPL CALC-MCNC: 49 MG/DL
MAGNESIUM SERPL-MCNC: 1.9 MG/DL (ref 1.6–2.6)
MICROALBUMIN UR-MCNC: 139 MG/L (ref 0–19)
MICROALBUMIN/CREAT UR-RTO: 299 MCG/MG CREAT (ref 0–30)
POTASSIUM SERPL-SCNC: 3.8 MMOL/L (ref 3.5–5)
PROT SERPL-MCNC: 6.7 G/DL (ref 6.4–8.3)
PTH-INTACT SERPL-MCNC: 24.7 PG/ML (ref 15–65)
SODIUM SERPL-SCNC: 138 MMOL/L (ref 132–146)
TRIGL SERPL-MCNC: 74 MG/DL
VLDLC SERPL CALC-MCNC: 15 MG/DL

## 2023-12-04 PROCEDURE — 36415 COLL VENOUS BLD VENIPUNCTURE: CPT

## 2023-12-04 PROCEDURE — 82570 ASSAY OF URINE CREATININE: CPT

## 2023-12-04 PROCEDURE — 83970 ASSAY OF PARATHORMONE: CPT

## 2023-12-04 PROCEDURE — 80053 COMPREHEN METABOLIC PANEL: CPT

## 2023-12-04 PROCEDURE — 82306 VITAMIN D 25 HYDROXY: CPT

## 2023-12-04 PROCEDURE — 80061 LIPID PANEL: CPT

## 2023-12-04 PROCEDURE — 82043 UR ALBUMIN QUANTITATIVE: CPT

## 2023-12-04 PROCEDURE — 83036 HEMOGLOBIN GLYCOSYLATED A1C: CPT

## 2023-12-04 PROCEDURE — 83735 ASSAY OF MAGNESIUM: CPT

## 2023-12-05 LAB — 25(OH)D3 SERPL-MCNC: 93.9 NG/ML (ref 30–100)

## 2025-02-19 ENCOUNTER — HOSPITAL ENCOUNTER (OUTPATIENT)
Age: 45
Discharge: HOME | End: 2025-02-19
Payer: MEDICARE

## 2025-02-19 LAB
25(OH)D3 SERPL-MCNC: 116.4 NG/ML (ref 30–100)
ALBUMIN SERPL-MCNC: 4.2 G/DL (ref 3.5–5.2)
ALP SERPL-CCNC: 88 U/L (ref 35–104)
ALT SERPL-CCNC: 18 U/L (ref 0–32)
ANION GAP SERPL CALCULATED.3IONS-SCNC: 7 MMOL/L (ref 7–16)
AST SERPL-CCNC: 17 U/L (ref 0–31)
BILIRUB SERPL-MCNC: 0.4 MG/DL (ref 0–1.2)
BUN SERPL-MCNC: 13 MG/DL (ref 6–20)
CALCIUM SERPL-MCNC: 8.9 MG/DL (ref 8.6–10.2)
CHLORIDE SERPL-SCNC: 106 MMOL/L (ref 98–107)
CHOLEST SERPL-MCNC: 152 MG/DL
CO2 SERPL-SCNC: 27 MMOL/L (ref 22–29)
CREAT SERPL-MCNC: 0.5 MG/DL (ref 0.5–1)
CREAT UR-MCNC: 13 MG/DL (ref 29–226)
GFR, ESTIMATED: >90 ML/MIN/1.73M2
GLUCOSE SERPL-MCNC: 87 MG/DL (ref 74–99)
HBA1C MFR BLD: 5.2 % (ref 4–5.6)
HDLC SERPL-MCNC: 69 MG/DL
LDLC SERPL CALC-MCNC: 65 MG/DL
MAGNESIUM SERPL-MCNC: 2 MG/DL (ref 1.6–2.6)
MICROALBUMIN UR-MCNC: 29 MG/L (ref 0–19)
MICROALBUMIN/CREAT UR-RTO: 222 MCG/MG CREAT (ref 0–30)
POTASSIUM SERPL-SCNC: 4 MMOL/L (ref 3.5–5)
PROT SERPL-MCNC: 7.4 G/DL (ref 6.4–8.3)
PTH-INTACT SERPL-MCNC: 40.9 PG/ML (ref 15–65)
SODIUM SERPL-SCNC: 140 MMOL/L (ref 132–146)
TRIGL SERPL-MCNC: 89 MG/DL
VLDLC SERPL CALC-MCNC: 18 MG/DL

## 2025-02-19 PROCEDURE — 83036 HEMOGLOBIN GLYCOSYLATED A1C: CPT

## 2025-02-19 PROCEDURE — 82306 VITAMIN D 25 HYDROXY: CPT

## 2025-02-19 PROCEDURE — 80061 LIPID PANEL: CPT

## 2025-02-19 PROCEDURE — 83970 ASSAY OF PARATHORMONE: CPT

## 2025-02-19 PROCEDURE — 83735 ASSAY OF MAGNESIUM: CPT

## 2025-02-19 PROCEDURE — 82570 ASSAY OF URINE CREATININE: CPT

## 2025-02-19 PROCEDURE — 36415 COLL VENOUS BLD VENIPUNCTURE: CPT

## 2025-02-19 PROCEDURE — 80053 COMPREHEN METABOLIC PANEL: CPT

## 2025-02-19 PROCEDURE — 82043 UR ALBUMIN QUANTITATIVE: CPT
